# Patient Record
Sex: FEMALE | Race: WHITE | NOT HISPANIC OR LATINO | Employment: OTHER | URBAN - METROPOLITAN AREA
[De-identification: names, ages, dates, MRNs, and addresses within clinical notes are randomized per-mention and may not be internally consistent; named-entity substitution may affect disease eponyms.]

---

## 2017-06-28 ENCOUNTER — TELEPHONE (OUTPATIENT)
Dept: DERMATOLOGY | Facility: CLINIC | Age: 82
End: 2017-06-28

## 2017-07-03 ENCOUNTER — TELEPHONE (OUTPATIENT)
Dept: DERMATOLOGY | Facility: CLINIC | Age: 82
End: 2017-07-03

## 2017-09-09 RX ORDER — CLOBETASOL PROPIONATE 0.5 MG/G
OINTMENT TOPICAL
Qty: 60 G | Refills: 2 | Status: SHIPPED | OUTPATIENT
Start: 2017-09-09 | End: 2018-05-22 | Stop reason: SDUPTHER

## 2017-09-28 RX ORDER — TRIAMCINOLONE ACETONIDE 1 MG/G
CREAM TOPICAL 2 TIMES DAILY
Qty: 453.6 G | Refills: 2 | Status: ON HOLD | OUTPATIENT
Start: 2017-09-28 | End: 2018-06-11 | Stop reason: CLARIF

## 2017-11-03 PROBLEM — M54.2 NECK PAIN: Status: ACTIVE | Noted: 2017-11-03

## 2017-11-29 DIAGNOSIS — M48.061 SPINAL STENOSIS, LUMBAR REGION, WITHOUT NEUROGENIC CLAUDICATION: Primary | ICD-10-CM

## 2017-12-05 DIAGNOSIS — Z12.39 BREAST SCREENING: Primary | ICD-10-CM

## 2018-01-04 PROBLEM — M54.50 CHRONIC LEFT-SIDED LOW BACK PAIN WITHOUT SCIATICA: Status: ACTIVE | Noted: 2018-01-04

## 2018-01-04 PROBLEM — G89.29 CHRONIC LEFT-SIDED LOW BACK PAIN WITHOUT SCIATICA: Status: ACTIVE | Noted: 2018-01-04

## 2018-02-15 DIAGNOSIS — L12.0 BULLOUS PEMPHIGOID: ICD-10-CM

## 2018-02-16 RX ORDER — HYDROXYZINE HYDROCHLORIDE 25 MG/1
TABLET, FILM COATED ORAL
Qty: 30 TABLET | Refills: 1 | Status: SHIPPED | OUTPATIENT
Start: 2018-02-16 | End: 2018-10-19

## 2018-05-23 RX ORDER — CLOBETASOL PROPIONATE 0.5 MG/G
OINTMENT TOPICAL
Qty: 60 G | Refills: 2 | Status: SHIPPED | OUTPATIENT
Start: 2018-05-23 | End: 2019-10-16 | Stop reason: SDUPTHER

## 2018-06-09 ENCOUNTER — HOSPITAL ENCOUNTER (INPATIENT)
Facility: HOSPITAL | Age: 83
LOS: 6 days | Discharge: NURSING, REHAB OR LONG-TERM CARE | DRG: 037 | End: 2018-06-15
Attending: PSYCHIATRY & NEUROLOGY | Admitting: PSYCHIATRY & NEUROLOGY
Payer: MEDICARE

## 2018-06-09 DIAGNOSIS — I63.411 EMBOLIC STROKE INVOLVING RIGHT MIDDLE CEREBRAL ARTERY: Primary | ICD-10-CM

## 2018-06-09 DIAGNOSIS — I63.9 CEREBROVASCULAR ACCIDENT (CVA), UNSPECIFIED MECHANISM: ICD-10-CM

## 2018-06-09 PROBLEM — I10 BENIGN ESSENTIAL HTN: Status: ACTIVE | Noted: 2018-06-09

## 2018-06-09 LAB
ABO + RH BLD: NORMAL
BLD GP AB SCN CELLS X3 SERPL QL: NORMAL
CHOLEST SERPL-MCNC: 159 MG/DL
CHOLEST/HDLC SERPL: 4.4 {RATIO}
ESTIMATED AVG GLUCOSE: 131 MG/DL
HBA1C MFR BLD HPLC: 6.2 %
HDLC SERPL-MCNC: 36 MG/DL
HDLC SERPL: 22.6 %
LDLC SERPL CALC-MCNC: 109.4 MG/DL
NONHDLC SERPL-MCNC: 123 MG/DL
POCT GLUCOSE: 172 MG/DL (ref 70–110)
TRIGL SERPL-MCNC: 68 MG/DL
TSH SERPL DL<=0.005 MIU/L-ACNC: 1.51 UIU/ML

## 2018-06-09 PROCEDURE — 99223 1ST HOSP IP/OBS HIGH 75: CPT | Mod: ,,, | Performed by: PHYSICIAN ASSISTANT

## 2018-06-09 PROCEDURE — 80061 LIPID PANEL: CPT | Mod: 91

## 2018-06-09 PROCEDURE — A4216 STERILE WATER/SALINE, 10 ML: HCPCS | Performed by: HOSPITALIST

## 2018-06-09 PROCEDURE — B313YZZ FLUOROSCOPY OF RIGHT COMMON CAROTID ARTERY USING OTHER CONTRAST: ICD-10-PCS | Performed by: NEUROLOGICAL SURGERY

## 2018-06-09 PROCEDURE — 96374 THER/PROPH/DIAG INJ IV PUSH: CPT

## 2018-06-09 PROCEDURE — 25000003 PHARM REV CODE 250: Performed by: NEUROLOGICAL SURGERY

## 2018-06-09 PROCEDURE — 99284 EMERGENCY DEPT VISIT MOD MDM: CPT | Mod: ,,, | Performed by: EMERGENCY MEDICINE

## 2018-06-09 PROCEDURE — 86901 BLOOD TYPING SEROLOGIC RH(D): CPT

## 2018-06-09 PROCEDURE — 96375 TX/PRO/DX INJ NEW DRUG ADDON: CPT

## 2018-06-09 PROCEDURE — 84443 ASSAY THYROID STIM HORMONE: CPT | Mod: 91

## 2018-06-09 PROCEDURE — 99223 1ST HOSP IP/OBS HIGH 75: CPT | Mod: 25,GC,ICN, | Performed by: NEUROLOGICAL SURGERY

## 2018-06-09 PROCEDURE — 25000003 PHARM REV CODE 250: Performed by: HOSPITALIST

## 2018-06-09 PROCEDURE — B316YZZ FLUOROSCOPY OF RIGHT INTERNAL CAROTID ARTERY USING OTHER CONTRAST: ICD-10-PCS | Performed by: NEUROLOGICAL SURGERY

## 2018-06-09 PROCEDURE — 63600175 PHARM REV CODE 636 W HCPCS: Performed by: NEUROLOGICAL SURGERY

## 2018-06-09 PROCEDURE — 99285 EMERGENCY DEPT VISIT HI MDM: CPT | Mod: 25

## 2018-06-09 PROCEDURE — B31NYZZ FLUOROSCOPY OF OTHER UPPER ARTERIES USING OTHER CONTRAST: ICD-10-PCS | Performed by: NEUROLOGICAL SURGERY

## 2018-06-09 PROCEDURE — 25500020 PHARM REV CODE 255: Performed by: PSYCHIATRY & NEUROLOGY

## 2018-06-09 PROCEDURE — 83036 HEMOGLOBIN GLYCOSYLATED A1C: CPT

## 2018-06-09 PROCEDURE — 63600175 PHARM REV CODE 636 W HCPCS: Performed by: EMERGENCY MEDICINE

## 2018-06-09 PROCEDURE — 20000000 HC ICU ROOM

## 2018-06-09 PROCEDURE — 03CY3ZZ EXTIRPATION OF MATTER FROM UPPER ARTERY, PERCUTANEOUS APPROACH: ICD-10-PCS | Performed by: NEUROLOGICAL SURGERY

## 2018-06-09 PROCEDURE — 99223 1ST HOSP IP/OBS HIGH 75: CPT | Mod: AI,GC,, | Performed by: PSYCHIATRY & NEUROLOGY

## 2018-06-09 PROCEDURE — 25000003 PHARM REV CODE 250: Performed by: PHYSICIAN ASSISTANT

## 2018-06-09 RX ORDER — SODIUM CHLORIDE 9 MG/ML
INJECTION, SOLUTION INTRAVENOUS CONTINUOUS
Status: DISCONTINUED | OUTPATIENT
Start: 2018-06-09 | End: 2018-06-11

## 2018-06-09 RX ORDER — CLOBETASOL PROPIONATE 0.5 MG/G
OINTMENT TOPICAL 2 TIMES DAILY
Status: DISCONTINUED | OUTPATIENT
Start: 2018-06-09 | End: 2018-06-15 | Stop reason: HOSPADM

## 2018-06-09 RX ORDER — NICARDIPINE HYDROCHLORIDE 0.2 MG/ML
5 INJECTION INTRAVENOUS CONTINUOUS
Status: DISCONTINUED | OUTPATIENT
Start: 2018-06-09 | End: 2018-06-10

## 2018-06-09 RX ORDER — ASPIRIN 81 MG/1
81 TABLET ORAL DAILY
Status: DISCONTINUED | OUTPATIENT
Start: 2018-06-10 | End: 2018-06-09

## 2018-06-09 RX ORDER — FENTANYL CITRATE 50 UG/ML
25 INJECTION, SOLUTION INTRAMUSCULAR; INTRAVENOUS ONCE
Status: COMPLETED | OUTPATIENT
Start: 2018-06-09 | End: 2018-06-09

## 2018-06-09 RX ORDER — ONDANSETRON 2 MG/ML
4 INJECTION INTRAMUSCULAR; INTRAVENOUS
Status: COMPLETED | OUTPATIENT
Start: 2018-06-09 | End: 2018-06-09

## 2018-06-09 RX ORDER — ATORVASTATIN CALCIUM 20 MG/1
40 TABLET, FILM COATED ORAL DAILY
Status: DISCONTINUED | OUTPATIENT
Start: 2018-06-10 | End: 2018-06-11

## 2018-06-09 RX ORDER — SODIUM CHLORIDE 0.9 % (FLUSH) 0.9 %
3 SYRINGE (ML) INJECTION EVERY 8 HOURS
Status: DISCONTINUED | OUTPATIENT
Start: 2018-06-09 | End: 2018-06-15 | Stop reason: HOSPADM

## 2018-06-09 RX ADMIN — Medication 3 ML: at 10:06

## 2018-06-09 RX ADMIN — NICARDIPINE HYDROCHLORIDE 5 MG/HR: 0.2 INJECTION, SOLUTION INTRAVENOUS at 11:06

## 2018-06-09 RX ADMIN — CLOBETASOL PROPIONATE: 0.5 OINTMENT TOPICAL at 09:06

## 2018-06-09 RX ADMIN — NICARDIPINE HYDROCHLORIDE 15 MG/HR: 0.2 INJECTION, SOLUTION INTRAVENOUS at 04:06

## 2018-06-09 RX ADMIN — IOHEXOL 100 ML: 350 INJECTION, SOLUTION INTRAVENOUS at 02:06

## 2018-06-09 RX ADMIN — SODIUM CHLORIDE: 0.9 INJECTION, SOLUTION INTRAVENOUS at 08:06

## 2018-06-09 RX ADMIN — ONDANSETRON 4 MG: 2 INJECTION INTRAMUSCULAR; INTRAVENOUS at 02:06

## 2018-06-09 RX ADMIN — FENTANYL CITRATE 25 MCG: 50 INJECTION, SOLUTION INTRAMUSCULAR; INTRAVENOUS at 02:06

## 2018-06-09 NOTE — PROGRESS NOTES
Patient arrived to [facility] from [facility] by [place] ambulance service    Type of stroke/diagnosis:  RMCA    TPA start 1243 and end time 1400      Thrombectomy start and end time 1520      Skin assessment done: Y  Wounds noted: none   NCC notified: name of person notified: Nancy

## 2018-06-09 NOTE — BRIEF OP NOTE
R MCA occlusion.   TICI 3 post two passes with Trevo stent retriever.     No complications.     Sheath left in place due to atherosclerotic disease in R femoral artery.     Juan Ashton M.D.  [facility].

## 2018-06-09 NOTE — SUBJECTIVE & OBJECTIVE
Past Medical History:   Diagnosis Date    Bullous pemphigoid     pemphigoid nodularis variant    DVT (deep venous thrombosis)     Hypertension      Past Surgical History:   Procedure Laterality Date    BREAST BIOPSY Bilateral     BOTH BENIGN    CHOLECYSTECTOMY      FRACTURE SURGERY Right     ankle    HYSTERECTOMY      TONSILLECTOMY       Family History   Problem Relation Age of Onset    Melanoma Neg Hx     Psoriasis Neg Hx     Lupus Neg Hx     Eczema Neg Hx      Social History   Substance Use Topics    Smoking status: Never Smoker    Smokeless tobacco: Not on file    Alcohol use No     Review of patient's allergies indicates:   Allergen Reactions    Sulfa (sulfonamide antibiotics) Hives       Medications: I have not reviewed the current medication administration record.      Review of Systems   Constitutional: Negative for fever.   Gastrointestinal: Positive for nausea and vomiting.   Neurological: Positive for facial asymmetry and weakness.     Objective:     Vital Signs (Most Recent):  Pulse: 80 (06/09/18 1414)  Resp: 18 (06/09/18 1354)  BP: (!) 188/88 (06/09/18 1414)  SpO2: 100 % (06/09/18 1414)    Vital Signs Range (Last 24H):  Temp:  [97.4 °F (36.3 °C)]   Pulse:  [67-80]   Resp:  [13-18]   BP: (149-188)/(54-88)   SpO2:  [95 %-100 %]     Physical Exam   Constitutional: She appears well-developed and well-nourished. No distress.   HENT:   Head: Normocephalic and atraumatic.   Neck: Neck supple.   Cardiovascular: Normal rate.    No murmur heard.  Pulmonary/Chest: Effort normal.   Musculoskeletal: She exhibits no edema.   Neurological: She is alert. A cranial nerve deficit and sensory deficit is present. She exhibits abnormal muscle tone.   Skin: Skin is warm and dry.   Vitals reviewed.      Neurological Exam:   LOC: drowsy  Attention Span: poor  Language: No aphasia  Articulation: Dysarthria  Visual Fields: Hemianopsia left  Facial Movement (CN VII): Upper & lower facial weakness on the  Left  Motor: Arm left  Plegia 0/5  Leg left  Plegia 0/5  Arm right  Normal 5/5  Leg right Normal 5/5  Sensation: Fawad-anesthesia left      Laboratory:  CBC:   Recent Labs  Lab 06/09/18  1208   WBC 8.46   RBC 4.30   HGB 12.7   HCT 39.1      MCV 91   MCH 29.5   MCHC 32.5       Brain imaging:  CTA/CTH 6/9/18:   CT head: Acute right MCA distribution infarct with associated mild localized mass effect.  No significant midline shift or acute intracranial hemorrhage.  Superimposed generalized cerebral volume loss and chronic microvascular ischemic change.  CTA head: Abrupt termination of the distal M1 segment of the right MCA concerning for large vessel occlusion with good filling of distal branches via collateral vessels.  Mild bilateral carotid atherosclerotic plaque with less than 50% hemodynamically significant stenosis by NASCET criteria.      Cardiac Evaluation:   Pending.

## 2018-06-09 NOTE — ASSESSMENT & PLAN NOTE
R MCA syndrome, s/p IV tpA in ED. Dense left hemiplegia, gaze deviation to the right w/ limited visual field to the left. CT scan relatively unchanged, faint evidence of hyperdense mid/distal R M1.   NIHSS 21 on arrival.   Patient was taken to IR for thrombectomy around 2:30 pm.  Site of Occlusion: Right MCA  TICI Score: 3     Antithrombotics for secondary stroke prevention: Antiplatelets: None: Hold all Antithrombotics x 24 hours after IV t-PA administration     Statins for secondary stroke prevention and hyperlipidemia, if present:   Statins: Atorvastatin- 40 mg daily     Aggressive risk factor modification: None     Rehab efforts: PT/OT/SLP to evaluate and treat     Diagnostics ordered/pending:  HgbA1C to assess blood glucose levels, Lipid Profile to assess cholesterol levels, MRI head without contrast to assess brain parenchyma, TTE to assess cardiac function/status      VTE prophylaxis: None: Reason for No Pharmacological VTE Prophylaxis: thrombolysis    BP parameters: Infarct: Post sucessful thrombectomy, SBP <140

## 2018-06-09 NOTE — PROGRESS NOTES
Met pt in IR, received report from Blanca TIRADO from ED. Pt under sterile field and procedure started, unable to perform full neuro assessment. Pt oriented x4 moving right arm and leg spontaneously

## 2018-06-09 NOTE — PROGRESS NOTES
Pt to Interventional Radiology room 190 via stretcher for Cerebral angiogram (thrombectomy). Pt transferred to procedure table in the supine position.  Procedure site (right groin) prepped by   MOE BEE.  Neuro ICU here to relieve ED nurse.

## 2018-06-09 NOTE — PROGRESS NOTES
Type of Device used: Penumbra  Groin Time: 1443  First Pass time: 1505  Site of Occlusion: Right MCA  TICI Score: 3

## 2018-06-09 NOTE — CONSULTS
[facility]  Vascular Neurology  Comprehensive Stroke Center  Consult Note    Consults called by ED upon patient arrival.   Assessment/Plan:     Patient is a 86 y.o. year old female with:    Benign essential HTN    She is on amlodipine and olmesartan at home.   SBP goal is <140.         Embolic stroke involving right middle cerebral artery    R MCA syndrome, s/p IV tpA in ED. Dense left hemiplegia, gaze deviation to the right w/ limited visual field to the left. CT scan relatively unchanged, faint evidence of hyperdense mid/distal R M1.   NIHSS 21 on arrival.   Patient was taken to IR for thrombectomy around 2:30 pm.  Site of Occlusion: Right MCA  TICI Score: 3     Antithrombotics for secondary stroke prevention: Antiplatelets: None: Hold all Antithrombotics x 24 hours after IV t-PA administration     Statins for secondary stroke prevention and hyperlipidemia, if present:   Statins: Atorvastatin- 40 mg daily     Aggressive risk factor modification: None     Rehab efforts: PT/OT/SLP to evaluate and treat     Diagnostics ordered/pending:  HgbA1C to assess blood glucose levels, Lipid Profile to assess cholesterol levels, MRI head without contrast to assess brain parenchyma, TTE to assess cardiac function/status      VTE prophylaxis: None: Reason for No Pharmacological VTE Prophylaxis: thrombolysis    BP parameters: Infarct: Post sucessful thrombectomy, SBP <140                STROKE DOCUMENTATION     Acute Stroke Times   Last Known Normal Date: 06/09/18  Symptom Onset Date: 06/09/18  Stroke Team Called Date: 06/09/18  Stroke Team Arrival Date: 06/09/18  CT Interpretation Time: 1411  Decision to Treat Time for IR: 1430    NIH Scale:  1a. Level Of Consciousness: 1-->Not alert: but arousable by minor stimulation to obey, answer, or respond  1b. LOC Questions: 1-->Answers one question correctly  1c. LOC Commands: 2-->Performs neither task correctly  2. Best Gaze: 1-->Partial gaze palsy: gaze is abnormal  in one or both eyes, but forced deviation or total gaze paresis is not present  3. Visual: 1-->Partial hemianopia  4. Facial Palsy: 2-->Partial paralysis (total or near-total paralysis of lower face)  5a. Motor Arm, Left: 4-->No movement  5b. Motor Arm, Right: 0-->No drift: limb holds 90 (or 45) degrees for full 10 secs  6a. Motor Leg, Left: 4-->No movement  6b. Motor Leg, Right: 0-->No drift: leg holds 30 degree position for full 5 secs  7. Limb Ataxia: 0-->Absent  8. Sensory: 2-->Severe to total sensory loss: patient is not aware of being touched in the face, arm, and leg  9. Best Language: 1-->Mild-to-moderate aphasia: some obvious loss of fluency or facility of comprehension, without significant limitation on ideas expressed or form of expression. Reduction of speech and/or comprehension, however, makes conversation. . . (see row details)  10. Dysarthria: 1-->Mild-to-moderate dysarthria: patient slurs at least some words and, at worst, can be understood with some difficulty  11. Extinction and Inattention (formerly Neglect): 1-->Visual, tactile, auditory, spatial, or personal inattention or extinction to bilateral simultaneous stimulation in one of the sensory modalities  Total (NIH Stroke Scale): 21    Modified [place]   on arrival: 4   [place] Coma Scale:    ABCD2 Score:    [id number] Score:   HAS -BLED Score:   ICH Score:   Hunt & Morales Classification:       Thrombolysis Candidate? Yes, given prior to arrival at outside hospital      Interventional Revascularization Candidate?   Is the patient eligible for mechanical endovascular reperfusion (MARCO)?  Yes      Hemorrhagic change of an Ischemic Stroke: Does this patient have an ischemic stroke with hemorrhagic changes? No     Subjective:     History of Present Illness:  86 year old female with HTN on amlodipine and olmesartan. She was transferred for stroke s/p TPA.   Patient was driving her car when she started to be symptomatic with left sided weakness at  11:30am. She was taken to OSH where she was diagnosed with Right MCA stroke as she had dense MCA sign in CTH. tPA was given at 12:45pm which she finished en route just before arrival to [facility] at 1:53 p.m.    Since then, she had minimal improvement of her symptoms. Patient only complaint at this time is nausea.  Family reports they noticed left facial droop, and inability to move left arm and leg. No history of CVA in the past.        Past Medical History:   Diagnosis Date    Bullous pemphigoid     pemphigoid nodularis variant    DVT (deep venous thrombosis)     Hypertension      Past Surgical History:   Procedure Laterality Date    BREAST BIOPSY Bilateral     BOTH BENIGN    CHOLECYSTECTOMY      FRACTURE SURGERY Right     ankle    HYSTERECTOMY      TONSILLECTOMY       Family History   Problem Relation Age of Onset    Melanoma Neg Hx     Psoriasis Neg Hx     Lupus Neg Hx     Eczema Neg Hx      Social History   Substance Use Topics    Smoking status: Never Smoker    Smokeless tobacco: Not on file    Alcohol use No     Review of patient's allergies indicates:   Allergen Reactions    Sulfa (sulfonamide antibiotics) Hives       Medications: I have not reviewed the current medication administration record.      Review of Systems   Constitutional: Negative for fever.   Gastrointestinal: Positive for nausea and vomiting.   Neurological: Positive for facial asymmetry and weakness.     Objective:     Vital Signs (Most Recent):  Pulse: 80 (06/09/18 1414)  Resp: 18 (06/09/18 1354)  BP: (!) 188/88 (06/09/18 1414)  SpO2: 100 % (06/09/18 1414)    Vital Signs Range (Last 24H):  Temp:  [97.4 °F (36.3 °C)]   Pulse:  [67-80]   Resp:  [13-18]   BP: (149-188)/(54-88)   SpO2:  [95 %-100 %]     Physical Exam   Constitutional: She appears well-developed and well-nourished. No distress.   HENT:   Head: Normocephalic and atraumatic.   Neck: Neck supple.   Cardiovascular: Normal rate.    No murmur heard.  Pulmonary/Chest: Effort  normal.   Musculoskeletal: She exhibits no edema.   Neurological: She is alert. A cranial nerve deficit and sensory deficit is present. She exhibits abnormal muscle tone.   Skin: Skin is warm and dry.   Vitals reviewed.      Neurological Exam:   LOC: drowsy  Attention Span: poor  Language: No aphasia  Articulation: Dysarthria  Visual Fields: Hemianopsia left  Facial Movement (CN VII): Upper & lower facial weakness on the Left  Motor: Arm left  Plegia 0/5  Leg left  Plegia 0/5  Arm right  Normal 5/5  Leg right Normal 5/5  Sensation: Fawad-anesthesia left      Laboratory:  CBC:   Recent Labs  Lab 06/09/18  1208   WBC 8.46   RBC 4.30   HGB 12.7   HCT 39.1      MCV 91   MCH 29.5   MCHC 32.5       Brain imaging:  CTA/CTH 6/9/18:   CT head: Acute right MCA distribution infarct with associated mild localized mass effect.  No significant midline shift or acute intracranial hemorrhage.  Superimposed generalized cerebral volume loss and chronic microvascular ischemic change.  CTA head: Abrupt termination of the distal M1 segment of the right MCA concerning for large vessel occlusion with good filling of distal branches via collateral vessels.  Mild bilateral carotid atherosclerotic plaque with less than 50% hemodynamically significant stenosis by NASCET criteria.      Cardiac Evaluation:   Pending.       Marga Rae MD  [facility]  Department of Vascular Neurology   [facility]

## 2018-06-09 NOTE — ED PROVIDER NOTES
Encounter Date: 6/9/2018       History     Chief Complaint   Patient presents with    CVA Transfer     87yo F h/o HTN transferred for stroke s/p TPA. Per family, pt last seen normal around 11:30am, was driving when acutely had left sided weakness. They brought her to outside hospital, diagnosed with CVA and given TPA around 12:45pm.  Since then, minimal improvement of her symptoms. Patient only complaint at this time is nausea.  Family reports they noticed left facial droop, and inability to move left arm and leg. No history of CVA in the past.      The history is provided by the spouse and a relative.     Review of patient's allergies indicates:   Allergen Reactions    Sulfa (sulfonamide antibiotics) Hives     Past Medical History:   Diagnosis Date    Bullous pemphigoid     pemphigoid nodularis variant    DVT (deep venous thrombosis)     Hypertension      Past Surgical History:   Procedure Laterality Date    BREAST BIOPSY Bilateral     BOTH BENIGN    CHOLECYSTECTOMY      FRACTURE SURGERY Right     ankle    HYSTERECTOMY      TONSILLECTOMY       Family History   Problem Relation Age of Onset    Melanoma Neg Hx     Psoriasis Neg Hx     Lupus Neg Hx     Eczema Neg Hx      Social History   Substance Use Topics    Smoking status: Never Smoker    Smokeless tobacco: Not on file    Alcohol use No     Review of Systems   Constitutional: Negative for fever.   Respiratory: Negative for chest tightness and shortness of breath.    Cardiovascular: Negative for chest pain.   Gastrointestinal: Positive for nausea and vomiting. Negative for abdominal pain.   Neurological: Positive for weakness.       Physical Exam     Initial Vitals   BP Pulse Resp Temp SpO2   06/09/18 1354 06/09/18 1354 06/09/18 1354 06/09/18 1600 06/09/18 1354   (!) 160/71 67 18 97.2 °F (36.2 °C) 98 %      MAP       06/09/18 1354       100.67         Physical Exam    Constitutional: She appears well-developed and well-nourished. She is not  diaphoretic. No distress.   HENT:   Head: Normocephalic and atraumatic.   Eyes: Pupils are equal, round, and reactive to light.   Unable to look leftward   Neck: Neck supple.   Cardiovascular: Normal rate, regular rhythm, normal heart sounds and intact distal pulses. Exam reveals no gallop and no friction rub.    No murmur heard.  Pulmonary/Chest: Breath sounds normal. No respiratory distress. She has no wheezes.   Abdominal: Soft. Bowel sounds are normal. She exhibits no distension. There is no tenderness.   Neurological: She is alert.   Left facial droop, 1/5 strength in left upper and lower extremity, no sensation to left arm and leg, left sided neglect         ED Course   Procedures  Labs Reviewed   POCT GLUCOSE MONITORING CONTINUOUS          X-Ray Abdomen AP 1 View   Final Result      As above.         Electronically signed by: Moy Ramos MD   Date:    06/10/2018   Time:    16:12      MRI Brain Without Contrast   Final Result      Large right MCA territory acute infarct with evidence of hemorrhagic reconversion as above.      Subtle subarachnoid hemorrhage overlying right frontal sulci.      The findings were discussed with Dr. Espinoza at 2:01 p.m..      Electronically signed by resident: Mukesh Sandoval   Date:    06/10/2018   Time:    13:54      Electronically signed by: Moy Ramos MD   Date:    06/10/2018   Time:    14:16      CTA STROKE MULTI-PHASE   Final Result   Abnormal      CT head: Acute right MCA distribution infarct with associated mild localized mass effect.  No significant midline shift or acute intracranial hemorrhage.      Superimposed generalized cerebral volume loss and chronic microvascular ischemic change.      CTA head: Abrupt termination of the distal M1 segment of the right MCA concerning for large vessel occlusion with good filling of distal branches via collateral vessels.      Mild bilateral carotid atherosclerotic plaque with less than 50% hemodynamically significant stenosis  by NASCET criteria.      This report was flagged in Epic as abnormal.      COMMUNICATION   This critical result was discovered/received at 1440.  The critical information above was relayed directly by me by telephone to Dr. Schmid on 06/09/2018 at 1445.      Electronically signed by resident: Eric Fabian   Date:    06/09/2018   Time:    14:45      Electronically signed by: Moy Ramos MD   Date:    06/09/2018   Time:    15:25      IR Angiogram Carotid Internal Inc Arch and Cerebral Unilateral    (Results Pending)        Medical Decision Making:   ED Management:  Patient arrived to ED, vascular neurology evaluated, per vascular neurology still visualize thrombus in major vessel on CTA, they discussed case with IR and family and have plans now to do thrombectomy. Pt being taken to IR for thrombectomy.                      Clinical Impression:   The primary encounter diagnosis was Embolic stroke involving right middle cerebral artery. A diagnosis of Cerebrovascular accident (CVA), unspecified mechanism was also pertinent to this visit.                             Hari Lewis MD  Resident  06/11/18 1112

## 2018-06-09 NOTE — ED NOTES
Pt identifiers checked and accurate with Isa Sewell     Pt presents to ED via EMS transferred from [place]. Pt presents with left sided weakness and facial droop beginning at 11:30 am. Pt experiencing nausea and vomiting. Pt denies headache, CP and SOB.     LOC: The patient is awake, alert. Pt answering questions appropriately, slurred speech.   APPEARANCE: Patient presents via EMS, patient in hospital gown upon arrival   SKIN: The skin is warm and dry, color consistent with ethnicity, skin intact, no breakdown or bruising noted.  RESPIRATORY: Pt oxygen saturation 100% on 2 liters nasal cannula, pt drooling.   CARDIAC: Patient has no peripheral edema noted.  NEUROLOGIC: PERRL, 3 mm bilaterally, right eye opens spontaneously, behavior appropriate to situation, follows commands, left sided facial droop, purposeful motor response noted on right side, movement to painful stimuli on left side. 2+ bilateral radial pulses

## 2018-06-09 NOTE — CONSULTS
[facility]  Neurosurgery  Consult Note    Consults  Subjective:     Chief Complaint/Reason for Admission: R MCA occlusion    History of Present Illness: Ms Sewell is a 86F that presents as transfer from OSH after acute onset of hemiplegia at 1300, witnessed by family. tPA given at OSH, decision by ED at [facility]. Transferred for possible for thrombectomy        Medications:  Continuous Infusions:  Scheduled Meds:  PRN Meds:     Review of Systems  Objective:        There is no height or weight on file to calculate BMI.  Vital Signs (Most Recent):  Pulse: 80 (06/09/18 1414)  Resp: 18 (06/09/18 1354)  BP: (!) 188/88 (06/09/18 1414)  SpO2: 100 % (06/09/18 1414) Vital Signs (24h Range):  Temp:  [97.4 °F (36.3 °C)] 97.4 °F (36.3 °C)  Pulse:  [67-80] 80  Resp:  [13-18] 18  SpO2:  [95 %-100 %] 100 %  BP: (149-188)/(54-88) 188/88                           Neurosurgery Physical Exam   PERRL  dysarthric  R gaze deviation  L facial droop,  LUE/LLE plegic    NIHSS 18      Significant Labs:    Recent Labs  Lab 06/09/18  1208         K 3.7      CO2 24   BUN 25*   CREATININE 0.76   CALCIUM 9.8       Recent Labs  Lab 06/09/18  1208   WBC 8.46   HGB 12.7   HCT 39.1          Recent Labs  Lab 06/09/18  1208   INR 1.0     Microbiology Results (last 7 days)     ** No results found for the last 168 hours. **        All pertinent labs from the last 24 hours have been reviewed.    Significant Diagnostics:  I have reviewed all pertinent imaging results/findings within the past 24 hours.    Assessment/Plan:     Embolic stroke involving right middle cerebral artery    86F with R MCA occlusion    -to angio emergently for thrombectomy  -ASA 2  -malampati 2  -sedation plan: moderate                Hardeep Dorsey MD  Neurosurgery  [facility]

## 2018-06-09 NOTE — ASSESSMENT & PLAN NOTE
86F with R MCA occlusion    -to angio emergently for thrombectomy  -ASA 2  -malampati 2  -sedation plan: moderate

## 2018-06-09 NOTE — SUBJECTIVE & OBJECTIVE
Medications:  Continuous Infusions:  Scheduled Meds:  PRN Meds:     Review of Systems  Objective:        There is no height or weight on file to calculate BMI.  Vital Signs (Most Recent):  Pulse: 80 (06/09/18 1414)  Resp: 18 (06/09/18 1354)  BP: (!) 188/88 (06/09/18 1414)  SpO2: 100 % (06/09/18 1414) Vital Signs (24h Range):  Temp:  [97.4 °F (36.3 °C)] 97.4 °F (36.3 °C)  Pulse:  [67-80] 80  Resp:  [13-18] 18  SpO2:  [95 %-100 %] 100 %  BP: (149-188)/(54-88) 188/88                           Neurosurgery Physical Exam   PERRL  dysarthric  R gaze deviation  L facial droop,  LUE/LLE plegic    NIHSS 18      Significant Labs:    Recent Labs  Lab 06/09/18  1208         K 3.7      CO2 24   BUN 25*   CREATININE 0.76   CALCIUM 9.8       Recent Labs  Lab 06/09/18  1208   WBC 8.46   HGB 12.7   HCT 39.1          Recent Labs  Lab 06/09/18  1208   INR 1.0     Microbiology Results (last 7 days)     ** No results found for the last 168 hours. **        All pertinent labs from the last 24 hours have been reviewed.    Significant Diagnostics:  I have reviewed all pertinent imaging results/findings within the past 24 hours.

## 2018-06-10 LAB
ALBUMIN SERPL BCP-MCNC: 3 G/DL
ALP SERPL-CCNC: 69 U/L
ALT SERPL W/O P-5'-P-CCNC: 28 U/L
ANION GAP SERPL CALC-SCNC: 9 MMOL/L
AORTIC VALVE REGURGITATION: ABNORMAL
AORTIC VALVE STENOSIS: ABNORMAL
AST SERPL-CCNC: 29 U/L
BASOPHILS # BLD AUTO: 0.02 K/UL
BASOPHILS NFR BLD: 0.2 %
BILIRUB SERPL-MCNC: 0.5 MG/DL
BUN SERPL-MCNC: 15 MG/DL
CALCIUM SERPL-MCNC: 7.9 MG/DL
CHLORIDE SERPL-SCNC: 114 MMOL/L
CO2 SERPL-SCNC: 20 MMOL/L
CREAT SERPL-MCNC: 0.8 MG/DL
DIASTOLIC DYSFUNCTION: NO
DIFFERENTIAL METHOD: ABNORMAL
EOSINOPHIL # BLD AUTO: 0.1 K/UL
EOSINOPHIL NFR BLD: 0.5 %
ERYTHROCYTE [DISTWIDTH] IN BLOOD BY AUTOMATED COUNT: 14.1 %
EST. GFR  (AFRICAN AMERICAN): >60 ML/MIN/1.73 M^2
EST. GFR  (NON AFRICAN AMERICAN): >60 ML/MIN/1.73 M^2
ESTIMATED PA SYSTOLIC PRESSURE: 25.09
GLUCOSE SERPL-MCNC: 104 MG/DL
HCT VFR BLD AUTO: 34 %
HGB BLD-MCNC: 11 G/DL
IMM GRANULOCYTES # BLD AUTO: 0.1 K/UL
IMM GRANULOCYTES NFR BLD AUTO: 1 %
LYMPHOCYTES # BLD AUTO: 1.2 K/UL
LYMPHOCYTES NFR BLD: 11.6 %
MAGNESIUM SERPL-MCNC: 1.5 MG/DL
MCH RBC QN AUTO: 29.5 PG
MCHC RBC AUTO-ENTMCNC: 32.4 G/DL
MCV RBC AUTO: 91 FL
MITRAL VALVE MOBILITY: NORMAL
MITRAL VALVE REGURGITATION: ABNORMAL
MONOCYTES # BLD AUTO: 0.8 K/UL
MONOCYTES NFR BLD: 7.8 %
NEUTROPHILS # BLD AUTO: 8.3 K/UL
NEUTROPHILS NFR BLD: 78.9 %
NRBC BLD-RTO: 0 /100 WBC
PHOSPHATE SERPL-MCNC: 3.1 MG/DL
PLATELET # BLD AUTO: 230 K/UL
PMV BLD AUTO: 9.7 FL
POCT GLUCOSE: 104 MG/DL (ref 70–110)
POTASSIUM SERPL-SCNC: 3.4 MMOL/L
PROT SERPL-MCNC: 5.8 G/DL
RBC # BLD AUTO: 3.73 M/UL
RETIRED EF AND QEF - SEE NOTES: 60 (ref 55–65)
SODIUM SERPL-SCNC: 143 MMOL/L
WBC # BLD AUTO: 10.45 K/UL

## 2018-06-10 PROCEDURE — G8978 MOBILITY CURRENT STATUS: HCPCS | Mod: CK

## 2018-06-10 PROCEDURE — G8996 SWALLOW CURRENT STATUS: HCPCS | Mod: CN

## 2018-06-10 PROCEDURE — 27000221 HC OXYGEN, UP TO 24 HOURS

## 2018-06-10 PROCEDURE — 25000003 PHARM REV CODE 250: Performed by: STUDENT IN AN ORGANIZED HEALTH CARE EDUCATION/TRAINING PROGRAM

## 2018-06-10 PROCEDURE — 99223 1ST HOSP IP/OBS HIGH 75: CPT | Mod: AI,GC,, | Performed by: PSYCHIATRY & NEUROLOGY

## 2018-06-10 PROCEDURE — 94761 N-INVAS EAR/PLS OXIMETRY MLT: CPT

## 2018-06-10 PROCEDURE — 20000000 HC ICU ROOM

## 2018-06-10 PROCEDURE — 85025 COMPLETE CBC W/AUTO DIFF WBC: CPT

## 2018-06-10 PROCEDURE — 97166 OT EVAL MOD COMPLEX 45 MIN: CPT

## 2018-06-10 PROCEDURE — 25000003 PHARM REV CODE 250: Performed by: PHYSICIAN ASSISTANT

## 2018-06-10 PROCEDURE — 36415 COLL VENOUS BLD VENIPUNCTURE: CPT

## 2018-06-10 PROCEDURE — 84100 ASSAY OF PHOSPHORUS: CPT

## 2018-06-10 PROCEDURE — 92610 EVALUATE SWALLOWING FUNCTION: CPT

## 2018-06-10 PROCEDURE — 99233 SBSQ HOSP IP/OBS HIGH 50: CPT | Mod: GC,,, | Performed by: PSYCHIATRY & NEUROLOGY

## 2018-06-10 PROCEDURE — 25000003 PHARM REV CODE 250: Performed by: HOSPITALIST

## 2018-06-10 PROCEDURE — A4216 STERILE WATER/SALINE, 10 ML: HCPCS | Performed by: HOSPITALIST

## 2018-06-10 PROCEDURE — 83735 ASSAY OF MAGNESIUM: CPT

## 2018-06-10 PROCEDURE — 63600175 PHARM REV CODE 636 W HCPCS: Performed by: STUDENT IN AN ORGANIZED HEALTH CARE EDUCATION/TRAINING PROGRAM

## 2018-06-10 PROCEDURE — G8997 SWALLOW GOAL STATUS: HCPCS | Mod: CL

## 2018-06-10 PROCEDURE — 97162 PT EVAL MOD COMPLEX 30 MIN: CPT

## 2018-06-10 PROCEDURE — 97802 MEDICAL NUTRITION INDIV IN: CPT

## 2018-06-10 PROCEDURE — 93306 TTE W/DOPPLER COMPLETE: CPT | Mod: 26,,, | Performed by: INTERNAL MEDICINE

## 2018-06-10 PROCEDURE — G8979 MOBILITY GOAL STATUS: HCPCS | Mod: CJ

## 2018-06-10 PROCEDURE — 63600175 PHARM REV CODE 636 W HCPCS: Performed by: PHYSICIAN ASSISTANT

## 2018-06-10 PROCEDURE — 80053 COMPREHEN METABOLIC PANEL: CPT

## 2018-06-10 PROCEDURE — 93306 TTE W/DOPPLER COMPLETE: CPT

## 2018-06-10 RX ORDER — ASPIRIN 325 MG
325 TABLET ORAL DAILY
Status: DISCONTINUED | OUTPATIENT
Start: 2018-06-10 | End: 2018-06-13

## 2018-06-10 RX ORDER — POTASSIUM CHLORIDE 20 MEQ/15ML
40 SOLUTION ORAL ONCE
Status: COMPLETED | OUTPATIENT
Start: 2018-06-10 | End: 2018-06-10

## 2018-06-10 RX ORDER — HEPARIN SODIUM 5000 [USP'U]/ML
5000 INJECTION, SOLUTION INTRAVENOUS; SUBCUTANEOUS EVERY 8 HOURS
Status: DISCONTINUED | OUTPATIENT
Start: 2018-06-10 | End: 2018-06-15 | Stop reason: HOSPADM

## 2018-06-10 RX ORDER — AMLODIPINE BESYLATE 5 MG/1
5 TABLET ORAL DAILY
Status: DISCONTINUED | OUTPATIENT
Start: 2018-06-10 | End: 2018-06-13

## 2018-06-10 RX ADMIN — POTASSIUM CHLORIDE 40 MEQ: 20 SOLUTION ORAL at 04:06

## 2018-06-10 RX ADMIN — AMLODIPINE BESYLATE 5 MG: 5 TABLET ORAL at 04:06

## 2018-06-10 RX ADMIN — ATORVASTATIN CALCIUM 40 MG: 20 TABLET, FILM COATED ORAL at 04:06

## 2018-06-10 RX ADMIN — CLOBETASOL PROPIONATE: 0.5 OINTMENT TOPICAL at 09:06

## 2018-06-10 RX ADMIN — Medication 3 ML: at 02:06

## 2018-06-10 RX ADMIN — HEPARIN SODIUM 5000 UNITS: 5000 INJECTION, SOLUTION INTRAVENOUS; SUBCUTANEOUS at 10:06

## 2018-06-10 RX ADMIN — MAGNESIUM SULFATE HEPTAHYDRATE 3 G: 500 INJECTION, SOLUTION INTRAMUSCULAR; INTRAVENOUS at 01:06

## 2018-06-10 RX ADMIN — HEPARIN SODIUM 5000 UNITS: 5000 INJECTION, SOLUTION INTRAVENOUS; SUBCUTANEOUS at 02:06

## 2018-06-10 RX ADMIN — Medication 3 ML: at 10:06

## 2018-06-10 RX ADMIN — ASPIRIN 325 MG ORAL TABLET 325 MG: 325 PILL ORAL at 04:06
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
## 2018-06-10 NOTE — ASSESSMENT & PLAN NOTE
- Hold home oral antihypertensives in acute post-ictus period  - SBP goal 100-140  - Nicardipine ordered, not requiring at this time  - Echo pending

## 2018-06-10 NOTE — PLAN OF CARE
Problem: Patient Care Overview  Goal: Plan of Care Review  Outcome: Ongoing (interventions implemented as appropriate)  POC reviewed with pt and family at 1400. Pt, , and daughter verbalized understanding. Questions and concerns addressed. No acute events today. Pt progressing toward goals. Will continue to monitor. See flowsheets for full assessment and VS info.

## 2018-06-10 NOTE — PLAN OF CARE
Problem: Physical Therapy Goal  Goal: Physical Therapy Goal  Outcome: Ongoing (interventions implemented as appropriate)  Initial eval completed.  Results, POC, and therapy recommendations discussed with patient,  and fmaily.  Complete evaluation documentation to follow.     Pt requires 1 person assistance for transfers to/from chair.     Marleni Preston, PT  6/10/2018  [phone] (pager)

## 2018-06-10 NOTE — H&P
[facility]  Neurocritical Care  History & Physical    Admit Date: 6/9/2018  Service Date: 06/09/2018  Length of Stay: 0    Subjective:     Chief Complaint: Embolic stroke involving right middle cerebral artery    History of Present Illness: Ms. Sewell is a 85 y/o female with PMH significant for HTN and bullous pemphigoid who presents to [facility] s/p tPA and R MCA thrombectomy. Patient developed acute onset LSW at 1130 am. Patient received TPA at 1245 pm and was then transferred to [facility] for thrombectomy. Patient had successful thrombectomy of R MCA with TICI 3 after two passes.     Vitals:   Temp: 97.2 °F (36.2 °C)  Pulse: 61  Rhythm: normal sinus rhythm  BP: (!) 141/62  MAP (mmHg): 89  Resp: (!) 22  SpO2: 100 %  O2 Device (Oxygen Therapy): nasal cannula    Temp  Min: 97.2 °F (36.2 °C)  Max: 97.4 °F (36.3 °C)  Pulse  Min: 20  Max: 81  BP  Min: 90/58  Max: 188/88  MAP (mmHg)  Min: 66  Max: 127  Resp  Min: 10  Max: 26  SpO2  Min: 95 %  Max: 100 %    No intake/output data recorded.         Examination:   Constitutional: Well-nourished and -developed. No apparent distress.   Eyes: Conjunctiva clear, anicteric. Lids no lesions.  Head/Ears/Nose/Mouth/Throat/Neck: Moist mucous membranes. External ears, nose atraumatic.   Cardiovascular: Regular rhythm. No murmurs. No leg edema.  Respiratory: Comfortable respirations. Clear to auscultation.  Gastrointestinal: No hernia. Soft, nondistended, nontender. + bowel sounds.    Neurologic:   -[id number]  -Alert. Oriented to person, place, and time. Speech dysarthric. Follows commands. Recent and remote memory adequate. Judgment good. Insight appropriate.  -L facial droop, R gaze preference, but able to cross midline, blinks to threat bilaterally   -Strength: LUE 4/5, LLE 3/5, 5/5 on R.   -Sensation intact to touch in arms, legs.  -Gait and station not tested a patient flat post-procedurally     Today I independently reviewed pertinent medications, lines/drains/airways,  imaging, lab results,     Assessment/Plan:     Neuro   Embolic stroke involving right middle cerebral artery    85 y/o female with PMH of HTN s/p TPA and thrombectomy for RMCA stroke   - Admit to NCC  - Sheath remains in place, will be removed by NSGY tonight, flat x4h post-removal   - Vascular Neurology following  - Statin   - Hold antiplatelets x24h post-TPA  - Echo, TSH, lipid panel, hemoglobin A1c for stroke work-up  - PT/OT/SLP  - MRI scheduled for tomorrow at noon        Derm   Pemphigus    - Continue home cream         Cardiac/Vascular   Benign essential HTN    - Hold home oral antihypertensives in acute post-ictus period  - SBP goal 100-140  - Nicardipine ordered, not requiring at this time  - Echo pending             Prophylaxis:  Venous Thromboembolism: mechanical  Stress Ulcer: NA  Ventilator Pneumonia: not applicable     Activity Orders          None        Full Code    Mari Butler PA-C  Neurocritical Care  [facility]

## 2018-06-10 NOTE — ASSESSMENT & PLAN NOTE
85 y/o female with PMH of HTN s/p TPA and thrombectomy for RMCA stroke   - Admit to NCC  - Sheath remains in place, will be removed by NSGY tonight, flat x4h post-removal   - Vascular Neurology following  - Statin   - Hold antiplatelets x24h post-TPA  - Echo, TSH, lipid panel, hemoglobin A1c for stroke work-up  - PT/OT/SLP  - MRI scheduled for tomorrow at noon

## 2018-06-10 NOTE — NURSING
1200: Pt brought to MRI via bed w/ 1 RN and 1 PCT w/ portable tele monitor, ambu bag, and 2L NC.    1300: Pt arrived back in room from MRI. Pt reoriented to the room. Pt tolerated procedure well.

## 2018-06-10 NOTE — PLAN OF CARE
Problem: Patient Care Overview  Goal: Plan of Care Review  Outcome: Ongoing (interventions implemented as appropriate)  Recommendations     1. If able to advance diet, recommend regular diet (texture per SLP).   2. If unable to advance diet, initiate enteral nutrition. Recommend Isosource 1.5 @ 40 mL/hr to provide 1440 calories, 65 g of protein, 733 mL fluid.   3. RD to monitor & follow-up.

## 2018-06-10 NOTE — PT/OT/SLP EVAL
"Speech Language Pathology Evaluation  Bedside Swallow    Patient Name:  Isa Sewell   MRN:  [id number]  Admitting Diagnosis: Embolic stroke involving right middle cerebral artery   S/p thrombectomy     Recommendations:                 General Recommendations:  Dysphagia therapy, Speech language evaluation and Cognitive-linguistic evaluation  Diet recommendations:  NPO, NPO   Aspiration Precautions: Alternate means of nutrition/hydration, Frequent oral care and Strict aspiration precautions   General Precautions: Standard, aspiration, fall, NPO  Communication strategies:  provide increased time to answer    History:     Past Medical History:   Diagnosis Date    Bullous pemphigoid     pemphigoid nodularis variant    DVT (deep venous thrombosis)     Hypertension        Past Surgical History:   Procedure Laterality Date    BREAST BIOPSY Bilateral     BOTH BENIGN    CHOLECYSTECTOMY      FRACTURE SURGERY Right     ankle    HYSTERECTOMY      TONSILLECTOMY         Social History: Patient lives with spouse, indpt pta, including driving.    Prior Intubation HX:  None this admission    Modified Barium Swallow: none this admission    Chest X-Rays: no recent    Prior diet: reg/thin.    Subjective     Nursing reports coughing on thins this morning.  Daughter at bedside reports pt has a "nasal drip" that makes her cough.       Pain/Comfort:  · Pain Rating 1: 0/10  · Pain Rating Post-Intervention 1: 0/10    Objective:     Oral Musculature Evaluation  · Oral Musculature: left weakness  · Dentition: present and adequate  · Mucosal Quality: adequate  · Oral Labial Strength and Mobility: impaired retraction  · Lingual Strength and Mobility: impaired strength  · Buccal Strength and Mobility: decreased tone  · Volitional Cough: weak  · Volitional Swallow: decreased rise palpated  · Voice Prior to PO Intake: clear, dysarthria    Bedside Swallow Eval:   Consistencies Assessed:  · Thin liquids tspn x2  · Puree tspn x2     Oral " Phase:   · Slow oral transit time    Pharyngeal Phase:   · Coughing/choking on 1/2 tspns of thin and 1/2 tspns of puree  · decreased hyolaryngeal excursion to palpation  · delayed swallow initation  · multiple spontaneous swallows  · wet vocal quality after swallow    Compensatory Strategies  · None    Treatment: Educaiton provided on role of SLP, recs for npo, s/s aspiration, increased aspiration risk s/p stroke with significant L facial weakness, risks associated with aspiration and SLP POC.  Pt and family verbalized understnading and agreement.  Pt's nurse alerted re: results and recs.   White board updated.      Assessment:     Isa Sewell is a 86 y.o. female with an SLP diagnosis of Dysphagia and Dysarthria.  She presents with overt s/s aspiration with thin and puree trials.    Goals:    SLP Goals        Problem: SLP Goal    Goal Priority Disciplines Outcome   SLP Goal     SLP Ongoing (interventions implemented as appropriate)   Description:  Speech Language Pathology Goals  Goals expected to be met by 6/17  1. Pt will participate in ongoing assessment of swallow.   2. Pt will complete speech, language, cognitive evaluation to determine need for tx.                           Plan:     · Patient to be seen:  5 x/week   · Plan of Care expires:  07/10/18  · Plan of Care reviewed with:  patient, family   · SLP Follow-Up:  Yes       Discharge recommendations:   (pending PT/OT)   Barriers to Discharge:  not safe for po intake at this time    Time Tracking:     SLP Treatment Date:   06/10/18  Speech Start Time:  0915  Speech Stop Time:  0932     Speech Total Time (min):  17 min    Billable Minutes: Eval Swallow and Oral Function 17    JORGE Fitzpatrick, CCC-SLP  06/10/2018

## 2018-06-10 NOTE — NURSING
Pt HR 55 when asleep. Pt HR 50, nonsymptomatic. Notified LOVE Cruz w/ CANDE. 12 lead analysis done and given to Nancy. Will continue to monitor very closely.

## 2018-06-10 NOTE — CONSULTS
"  [facility]  Adult Nutrition  Consult Note    SUMMARY     Recommendations    1. If able to advance diet, recommend regular diet (texture per SLP).   2. If unable to advance diet, initiate enteral nutrition. Recommend Isosource 1.5 @ 40 mL/hr to provide 1440 calories, 65 g of protein, 733 mL fluid.   3. RD to monitor & follow-up.    Goals: Meet % EEN, EPN  Nutrition Goal Status: new  Communication of RD Recs: reviewed with RN    Reason for Assessment    Reason for Assessment: consult  Diagnosis: stroke/CVA  Relevant Medical History: HTN  Interdisciplinary Rounds: did not attend    General Information Comments: S/p tpa & R. MCA thrombectomy. Failed ZURI, awaiting ST evaluation.   Nutrition Discharge Planning: Unable to determine    Nutrition/Diet History    Patient Reported Diet/Restrictions/Preferences: other (see comments) (KRIS)  Do you have any cultural, spiritual, Christian conflicts, given your current situation?: none  Factors Affecting Nutritional Intake: NPO    Anthropometrics    Temp: 97.9 °F (36.6 °C)  Height Method: Measured  Height: 5' 0.63" (154 cm)  Height (inches): 60.63 in  Weight Method: Bed Scale  Weight: 76 kg (167 lb 8.8 oz)  Weight (lb): 167.55 lb  Ideal Body Weight (IBW), Female: 103.15 lb  % Ideal Body Weight, Female (lb): 162.43 lb  BMI (Calculated): 32.1  BMI Grade: 30 - 34.9- obesity - grade I    Lab/Procedures/Meds    Pertinent Labs Reviewed: reviewed  Pertinent Labs Comments: A1C 6.2  Pertinent Medications Reviewed: reviewed  Pertinent Medications Comments: IVF, Nicardipine, Statin    Physical Findings/Assessment    Overall Physical Appearance: overweight, nourished  Oral/Mouth Cavity: WDL  Skin: intact    Estimated/Assessed Needs    Weight Used For Calorie Calculations: 76 kg (167 lb 8.8 oz)     Energy Calorie Requirements (kcal): 1415 kcal/d  Energy Need Method: [place] (1.25 PAL)     Protein Requirements: 69-84 g/d (.9-1.1 g/kg)  Weight Used For Protein " Calculations: 76 kg (167 lb 8.8 oz)     Fluid Need Method: other (see comments) (Per MD or 1 mL/kcal)     Nutrition Prescription Ordered    Current Diet Order: NPO    Evaluation of Received Nutrient/Fluid Intake    IV Fluid (mL): 1800    Comments: LBM not recorded    Nutrition Risk    Level of Risk/Frequency of Follow-up: high     Assessment and Plan    Cerebrovascular accident (CVA)      Nutrition Problem  Inadequate energy intake    Related to (etiology):   Inability to consume sufficient energy    Signs and Symptoms (as evidenced by):   NPO with no alternate means of nutrition     Nutrition Diagnosis Status:   New         Monitor and Evaluation    Food and Nutrient Intake: energy intake, food and beverage intake, enteral nutrition intake  Food and Nutrient Adminstration: diet order, enteral and parenteral nutrition administration  Physical Activity and Function: nutrition-related ADLs and IADLs  Anthropometric Measurements: weight, weight change  Biochemical Data, Medical Tests and Procedures: inflammatory profile, lipid profile, glucose/endocrine profile, gastrointestinal profile, electrolyte and renal panel  Nutrition-Focused Physical Findings: overall appearance     Nutrition Follow-Up    RD Follow-up?: Yes

## 2018-06-10 NOTE — PROGRESS NOTES
Neuro surgery @ BS to pull R femoral sheath. Arterial Sheath removed, Hemostasis achieved with manual pressure x10 min. Clean dressing applied, no bleeding, no hematoma noted. Pt tolerated well. Will continue to monitor.

## 2018-06-10 NOTE — CONSULTS
Inpatient consult to Physical Medicine Rehab  Consult performed by: AYAN ARNOLD  Consult ordered by: NISA SANFORD  Reason for consult: assess rehab needs        Reviewed patient history and current admission.  Rehab team following.  Full consult to follow.    JOSEPHINE Escobedo, FNP-C  Physical Medicine & Rehabilitation   06/10/2018  Spectralink: [id number]

## 2018-06-10 NOTE — PLAN OF CARE
Problem: Patient Care Overview  Goal: Plan of Care Review  Outcome: Ongoing (interventions implemented as appropriate)  POC reviewed with pt and daughter at 0500. Pt and daughter verbalized understanding. Questions and concerns addressed. No acute events overnight. Pt progressing toward goals. Will continue to monitor. See flowsheets for full assessment and VS info

## 2018-06-10 NOTE — PROGRESS NOTES
[facility]  Vascular Neurology  Comprehensive Stroke Center  Progress Note    Assessment/Plan:     * Embolic stroke involving right middle cerebral artery    R MCA syndrome, s/p IV tpA in ED. Dense left hemiplegia, gaze deviation to the right w/ limited visual field to the left. CT scan relatively unchanged, faint evidence of hyperdense mid/distal R M1.   NIHSS 21 on arrival.   Patient was taken to IR for thrombectomy around 2:30 pm.  Site of Occlusion: Right MCA  TICI Score: 3     Antithrombotics for secondary stroke prevention: Antiplatelets: None: Hold all Antithrombotics x 24 hours after IV t-PA administration     Statins for secondary stroke prevention and hyperlipidemia, if present:   Statins: Atorvastatin- 40 mg daily     Aggressive risk factor modification: None     Rehab efforts: PT/OT/SLP to evaluate and treat     Diagnostics ordered/pending:  HgbA1C to assess blood glucose levels, Lipid Profile to assess cholesterol levels, MRI head without contrast to assess brain parenchyma, TTE to assess cardiac function/status      VTE prophylaxis: None: Reason for No Pharmacological VTE Prophylaxis: thrombolysis    BP parameters: Infarct: Post sucessful thrombectomy, SBP <140            Benign essential HTN    She is on amlodipine and olmesartan at home.   SBP goal is <140.              Patient was transferred from OSH with Right MCA stroke. She received tPA. She is going to IR for thrombectomy.   9/10 she had small area of hemorrhagic conversion. She is clinically improving. She failed her swallow eval.     STROKE DOCUMENTATION   Acute Stroke Times   Last Known Normal Date: 06/09/18  Symptom Onset Date: 06/09/18  Stroke Team Called Date: 06/09/18  Stroke Team Arrival Date: 06/09/18  CT Interpretation Time: 1411  Decision to Treat Time for IR: 1430    NIH Scale:  1a. Level Of Consciousness: 0-->Alert: keenly responsive  1b. LOC Questions: 0-->Answers both questions correctly  1c. LOC Commands:  0-->Performs both tasks correctly  2. Best Gaze: 1-->Partial gaze palsy: gaze is abnormal in one or both eyes, but forced deviation or total gaze paresis is not present  3. Visual: 0-->No visual loss  4. Facial Palsy: 2-->Partial paralysis (total or near-total paralysis of lower face)  5a. Motor Arm, Left: 1-->Drift: limb holds 90 (or 45) degrees, but drifts down before full 10 seconds: does not hit bed or other support  5b. Motor Arm, Right: 0-->No drift: limb holds 90 (or 45) degrees for full 10 secs  6a. Motor Leg, Left: 1-->Drift: leg falls by the end of the 5-sec period but does not hit bed  6b. Motor Leg, Right: 0-->No drift: leg holds 30 degree position for full 5 secs  7. Limb Ataxia: 0-->Absent  8. Sensory: 0-->Normal: no sensory loss  9. Best Language: 1-->Mild-to-moderate aphasia: some obvious loss of fluency or facility of comprehension, without significant limitation on ideas expressed or form of expression. Reduction of speech and/or comprehension, however, makes conversation. . . (see row details)  10. Dysarthria: 1-->Mild-to-moderate dysarthria: patient slurs at least some words and, at worst, can be understood with some difficulty  11. Extinction and Inattention (formerly Neglect): 0-->No abnormality  Total (NIH Stroke Scale): 7       Modified [place]    [place] Coma Scale:    ABCD2 Score:    [id number] Score:   HAS -BLED Score:   ICH Score:   Hunt & Morales Classification:      Hemorrhagic change of an Ischemic Stroke: Does this patient have an ischemic stroke with hemorrhagic changes? Yes, Grading Scale: PH Type 1 (PH-1) = hematoma in < 30% of the infarcted area with some slight space-occupying effect. Is this a symptomatic change?  No - Hemorrhage is not clinically significant     Neurologic Chief Complaint: Right MCA stroke.     Subjective:     Interval History: Patient is seen for follow-up neurological assessment and treatment recommendations:   Patient is symptomatically improving after the  thrombectomy.     HPI, Past Medical, Family, and Social History remains the same as documented in the initial encounter.     Review of Systems   Constitutional: Negative for fever.   Gastrointestinal: Positive for nausea and vomiting.   Neurological: Positive for facial asymmetry and weakness.     Scheduled Meds:   amLODIPine  5 mg Per NG tube Daily    aspirin  325 mg Per NG tube Daily    atorvastatin  40 mg Oral Daily    clobetasol 0.05%   Topical (Top) BID    heparin (porcine)  5,000 Units Subcutaneous Q8H    magnesium sulfate IVPB  3 g Intravenous Once    potassium chloride 10%  40 mEq Per NG tube Once    sodium chloride 0.9%  3 mL Intravenous Q8H     Continuous Infusions:   sodium chloride 0.9% 75 mL/hr at 06/10/18 1202     PRN Meds:    Objective:     Vital Signs (Most Recent):  Temp: 97.9 °F (36.6 °C) (06/10/18 1102)  Pulse: 63 (06/10/18 1200)  Resp: 20 (06/10/18 1200)  BP: (!) 146/65 (06/10/18 1200)  SpO2: 100 % (06/10/18 1200)  BP Location: Right arm    Vital Signs Range (Last 24H):  Temp:  [97.2 °F (36.2 °C)-97.9 °F (36.6 °C)]   Pulse:  [51-85]   Resp:  [6-50]   BP: ()/(54-98)   SpO2:  [95 %-100 %]   Arterial Line BP: (131-159)/(52-82)   BP Location: Right arm    Physical Exam   Constitutional: She appears well-developed and well-nourished. No distress.   HENT:   Head: Normocephalic and atraumatic.   Neck: Neck supple.   Cardiovascular: Normal rate.    No murmur heard.  Pulmonary/Chest: Effort normal.   Musculoskeletal: She exhibits no edema.   Neurological: She is alert. A cranial nerve deficit and sensory deficit is present. She exhibits abnormal muscle tone.   Skin: Skin is warm and dry.   Vitals reviewed.    Neurological Exam:   LOC: Alert  Attention Span: good  Language: No aphasia  Articulation: Dysarthria  Visual Fields: intact   Facial Movement (CN VII): Upper & lower facial weakness on the Left  Motor: Arm left  Plegia  3/5  Leg left  Plegia  3/5  Arm right  Normal 5/5  Leg right  Normal 5/5  Sensation: intact     Laboratory:  CBC:   Recent Labs  Lab 06/10/18  0250   WBC 10.45   RBC 3.73*   HGB 11.0*   HCT 34.0*      MCV 91   MCH 29.5   MCHC 32.4       Diagnostic Results     Brain Imaging/Vessel Imaging   MRI 6/10   Large right MCA territory acute infarct with evidence of hemorrhagic reconversion as above.  Subtle subarachnoid hemorrhage overlying right frontal sulci.    CTH/CTA 6/9   CT head: Acute right MCA distribution infarct with associated mild localized mass effect.  No significant midline shift or acute intracranial hemorrhage.  Superimposed generalized cerebral volume loss and chronic microvascular ischemic change.  CTA head: Abrupt termination of the distal M1 segment of the right MCA concerning for large vessel occlusion with good filling of distal branches via collateral vessels.  Mild bilateral carotid atherosclerotic plaque with less than 50% hemodynamically significant stenosis by NASCET criteria.    Cardiac Imaging   Echo is pending.       Marga Rae MD  Comprehensive Stroke Center  Department of Vascular Neurology   [facility]

## 2018-06-10 NOTE — SUBJECTIVE & OBJECTIVE
Neurologic Chief Complaint: Right MCA stroke.     Subjective:     Interval History: Patient is seen for follow-up neurological assessment and treatment recommendations:   Patient has improved significantly despite developing a small area of hemorrhagic conversion. She has regained most of her strength back. She is still having facial weakness and difficulty swallowing.       HPI, Past Medical, Family, and Social History remains the same as documented in the initial encounter.     Review of Systems   Constitutional: Negative for fever.   HENT: Positive for trouble swallowing.    Gastrointestinal: Negative for nausea and vomiting.   Neurological: Positive for facial asymmetry. Negative for weakness.     Scheduled Meds:   amLODIPine  5 mg Per NG tube Daily    aspirin  325 mg Per NG tube Daily    atorvastatin  40 mg Oral Daily    clobetasol 0.05%   Topical (Top) BID    heparin (porcine)  5,000 Units Subcutaneous Q8H    magnesium sulfate IVPB  3 g Intravenous Once    potassium chloride 10%  40 mEq Per NG tube Once    sodium chloride 0.9%  3 mL Intravenous Q8H     Continuous Infusions:   sodium chloride 0.9% 75 mL/hr at 06/10/18 1402     PRN Meds:    Objective:     Vital Signs (Most Recent):  Temp: 97.9 °F (36.6 °C) (06/10/18 1102)  Pulse: 83 (06/10/18 1400)  Resp: 20 (06/10/18 1400)  BP: (!) 150/65 (06/10/18 1400)  SpO2: 99 % (06/10/18 1400)  BP Location: Right arm    Vital Signs Range (Last 24H):  Temp:  [97.2 °F (36.2 °C)-97.9 °F (36.6 °C)]   Pulse:  [51-85]   Resp:  [6-50]   BP: ()/(54-98)   SpO2:  [95 %-100 %]   Arterial Line BP: (131-159)/(52-82)   BP Location: Right arm    Physical Exam   Constitutional: She appears well-developed and well-nourished. No distress.   HENT:   Head: Normocephalic and atraumatic.   Neck: Neck supple.   Cardiovascular: Normal rate.    No murmur heard.  Pulmonary/Chest: Effort normal.   Musculoskeletal: She exhibits no edema.   Neurological: She is alert. A cranial nerve  deficit and sensory deficit is present. She exhibits abnormal muscle tone.   Skin: Skin is warm and dry.   Vitals reviewed.    Neurological Exam:   LOC: Alert  Attention Span: good  Language: No aphasia  Articulation: moderate Dysarthria  Visual Fields: intact   Facial Movement (CN VII): Upper < lower facial weakness on the Left  Motor: Arm left  Plegia +4/5  Leg left  Plegia +4/5  Arm right  Normal 5/5  Leg right Normal 5/5  Sensation: intact     Laboratory:  CBC:     Recent Labs  Lab 06/10/18  0250   WBC 10.45   RBC 3.73*   HGB 11.0*   HCT 34.0*      MCV 91   MCH 29.5   MCHC 32.4       Diagnostic Results     Brain Imaging/Vessel Imaging   MRI 6/10   Large right MCA territory acute infarct with evidence of hemorrhagic reconversion as above.  Subtle subarachnoid hemorrhage overlying right frontal sulci.    CTH/CTA 6/9   CT head: Acute right MCA distribution infarct with associated mild localized mass effect.  No significant midline shift or acute intracranial hemorrhage.  Superimposed generalized cerebral volume loss and chronic microvascular ischemic change.  CTA head: Abrupt termination of the distal M1 segment of the right MCA concerning for large vessel occlusion with good filling of distal branches via collateral vessels.  Mild bilateral carotid atherosclerotic plaque with less than 50% hemodynamically significant stenosis by NASCET criteria.    Cardiac Imaging   CONCLUSIONS     1 - Moderate left atrial enlargement.     2 - Normal left ventricular systolic function (EF 60-65%).     3 - No wall motion abnormalities.     4 - Normal left ventricular diastolic function.     5 - Normal right ventricular systolic function .     6 - Mild to moderate aortic stenosis, ESTELITA = 1.17 cm2, AVAi = 0.68 cm2/m2, peak velocity = 2.75 m/s, mean gradient = 17 mmHg.     7 - Mild aortic regurgitation.     8 - The estimated PA systolic pressure is 25 mmHg.     9 - Mild mitral regurgitation.

## 2018-06-10 NOTE — PLAN OF CARE
Problem: SLP Goal  Goal: SLP Goal  Speech Language Pathology Goals  Goals expected to be met by 6/17  1. Pt will participate in ongoing assessment of swallow.   2. Pt will complete speech, language, cognitive evaluation to determine need for tx.         Outcome: Ongoing (interventions implemented as appropriate)  Bedside swallow study completed.  Rec npo with strict aspiration precautions at this time.  SLP to continue to follow. JORGE Fitzpatrick, TWYLA/SLP  6/10/2018

## 2018-06-10 NOTE — PROGRESS NOTES
Pt transferred to room 7087 and report given to Bharti TIRADO. Pt placed in bed and on bedside  monitor

## 2018-06-10 NOTE — PT/OT/SLP EVAL
Occupational Therapy   Evaluation    Name: Isa Sewell  MRN: [id number]  Admitting Diagnosis:  Embolic stroke involving right middle cerebral artery      Recommendations:     Discharge Recommendations: rehabilitation facility  Discharge Equipment Recommendations:   (TBD pending progress)  Barriers to discharge:  None    History:     Occupational Profile:  Living Environment: Pt lives with  in [facility], [address].  Bathroom contains tub/shower and other contains walk-in shower with shower seat present.  Previous level of function: Pt reports being (I) with all ADLs and mobility.    Roles and Routines: Mother, wife, drives, enjoys Mosque, helps with housework, cooks  Equipment Owned:  shower chair  Assistance upon Discharge:  and family able to provide assist upon d/c.    Past Medical History:   Diagnosis Date    Bullous pemphigoid     pemphigoid nodularis variant    DVT (deep venous thrombosis)     Hypertension        Past Surgical History:   Procedure Laterality Date    BREAST BIOPSY Bilateral     BOTH BENIGN    CHOLECYSTECTOMY      FRACTURE SURGERY Right     ankle    HYSTERECTOMY      TONSILLECTOMY         Subjective     Chief Complaint: Weakness  Patient/Family stated goals: Resume PLOF  Communicated with: RN prior to session.  Pain/Comfort:  · Pain Rating 1: 0/10  · Pain Rating Post-Intervention 1: 0/10    Patients cultural, spiritual, Taoist conflicts given the current situation: None stated    Objective:     Patient found with: telemetry, blood pressure cuff, pulse ox (continuous), PureWick, peripheral IV, bed alarm, oxygen.  Daughter and  present.  Therapy tech (Cindy) assisted with session.    General Precautions: Standard, aspiration, fall, NPO   Orthopedic Precautions:N/A   Braces: N/A     Occupational Performance:    Bed Mobility:    · Patient completed Rolling/Turning to Left with  moderate assistance  · Patient completed Rolling/Turning to Right with moderate  assistance  · Patient completed Scooting/Bridging with moderate assistance towards EOB; Maximum assistance towards HOB via draw sheet (pt able to push with RLE)  · Patient completed Supine to Sit with maximal assistance  · Patient completed Sit to Supine with maximal assistance    Functional Mobility/Transfers:  · Patient completed Sit <> Stand Transfer with maximum assistance  with  no assistive device (with assist of 2) x 3 trials from EOB.  Left foot and knee blocked to prevent buckling and bilateral HHA provided.  OT and therapy tech stood on either side of pt.  Cues given to elevate head and extend hips to achieve full upright position.  Moderate postural instability noted during task.  · Functional Mobility: Pt practiced weight shifting with Max A and assist of 2, but unable to take steps this date.    Activities of Daily Living:  · Grooming: stand by assistance for washing face with cloth while seated EOB.  · UB Dressing: moderate assistance for donning/doffing gown on backside like robe while seated EOB.   · Toileting:  Total A to perform mera care in side lying position after urinating at bed level.  Pt able to assist with rolling and maintained side lying position without assist.    Cognitive/Visual Perceptual:  Cognitive/Psychosocial Skills:    -       Oriented to: Person, Place and Time   -       Follows Commands/attention:Follows two-step commands  -       Communication: clear/fluent  -       Memory: No deficits noted  -       Safety awareness/insight to disability: impaired   -       Mood/Affect/Coping skills/emotional control: Appropriate to situation    Physical Exam:  Postural examination/scapula alignment: -       Rounded shoulders  -       Forward head  Skin integrity: Eczema present  Edema:  None noted  Sensation: -       Intact  Motor Planning: -       WFL  Dominant hand: -       Right  Upper Extremity Range of Motion:    -       Right Upper Extremity: WFL  -       Left Upper Extremity:  WFL  Upper Extremity Strength:   -       Right Upper Extremity: WFL  -       Left Upper Extremity: WFL; slightly weaker than RUE   Strength:  4/5 right hand; 3/5 left hand  Fine Motor Coordination: -       Intact R hand; Impaired for left hand  Gross motor coordination: WFL  Balance:  Sitting- SBA-CGA; Standing- Max A    Patient left HOB elevated with all lines intact, call button in reach, bed alarm on, RN notified and family present    [facility] 6 Click:  [facility] Total Score: 15    Treatment & Education:  *Pt sat EOB for ~20 minutes with SBA-CGA required to maintain upright position.   *Pt and family educated on role of OT and progression of therapy  *Goals and POC discussed  *Whiteboard updated; pt safe to t/f with therapy only at this time to bedside chair.  Appropriate for draw sheet transfer to medi chair with 2 person assist of PCT and RN  Education:    Assessment:     Isa Sewell is a 86 y.o. female with a medical diagnosis of Embolic stroke involving right middle cerebral artery.  She presents with the following performance deficits affecting function: weakness, impaired endurance, impaired self care skills, impaired functional mobilty, gait instability, impaired balance, decreased safety awareness, decreased coordination, impaired fine motor, impaired skin.  Pt demonstrates strength and ROM in (B) UE needed for ADLs that is WFL, with LUE slightly weaker than RUE.  While seated EOB pt able to perform grooming task with SBA.  During sit to stand transfer difficulty achieving full upright position noted with buckling and instability noted in LLE requiring knee and foot to be blocked.  Pt required Max A (with assist of 2) to perform sit to stand transfer and was unable to take steps this date.  PTA pt reports being (I) with all ADLs and mobility.  Pt is not at PLOF and would benefit from skilled OT services to address problems listed below and increase independence with ADLs.  Rehab is recommended upon d/c  "from acute care to further address deficits and help pt improve overall functional independence.       Rehab Prognosis:  Good; patient would benefit from acute skilled OT services to address these deficits and reach maximum level of function.         Clinical Decision Makin.  OT Mod:  "Pt evaluation falls under moderate complexity for evaluation coding due to identification of 3-5 performance deficits noted as stated above. Eval required Min/Mod assistance to complete on this date and detailed assessment(s) were utilized. Moreover, an expanded review of history and occupational profile obtained with additional review of cognitive, physical and psychosocial hx."     Plan:     Patient to be seen 4 x/week to address the above listed problems via self-care/home management, therapeutic activities, therapeutic exercises, neuromuscular re-education  · Plan of Care Expires: 07/10/18  · Plan of Care Reviewed with: patient, daughter, spouse    This Plan of care has been discussed with the patient who was involved in its development and understands and is in agreement with the identified goals and treatment plan    GOALS:    Occupational Therapy Goals        Problem: Occupational Therapy Goal    Goal Priority Disciplines Outcome Interventions   Occupational Therapy Goal     OT, PT/OT     Description:  Goals to be met by: 2018    Patient will increase functional independence with ADLs by performing:    UE Dressing with Contact Guard Assistance.  LE Dressing with Moderate Assistance.  Grooming while standing with Minimal Assistance.  Toileting from bedside commode with Minimal Assistance for hygiene and clothing management.   Sitting at edge of bed x 20 minutes with Stand by Assistance.  Supine to sit with Minimal Assistance.  Stand pivot transfers with Minimal Assistance.  Toilet transfer to bedside commode with Minimal Assistance.                      Time Tracking:     OT Date of Treatment: 06/10/18  OT Start " Time: 0930  OT Stop Time: 1010  OT Total Time (min): 40 min    Billable Minutes:Evaluation 40    SUSU Cline  6/10/2018

## 2018-06-10 NOTE — PLAN OF CARE
Problem: Occupational Therapy Goal  Goal: Occupational Therapy Goal  Goals to be met by: 6/20/2018    Patient will increase functional independence with ADLs by performing:    UE Dressing with Contact Guard Assistance.  LE Dressing with Moderate Assistance.  Grooming while standing with Minimal Assistance.  Toileting from bedside commode with Minimal Assistance for hygiene and clothing management.   Sitting at edge of bed x 20 minutes with Stand by Assistance.  Supine to sit with Minimal Assistance.  Stand pivot transfers with Minimal Assistance.  Toilet transfer to bedside commode with Minimal Assistance.      OT evaluation complete and POC established.  Rehab is recommended upon d/c from acute care to further address deficits and help pt improve overall functional independence.     SUSU Clien  6/10/2018

## 2018-06-10 NOTE — SUBJECTIVE & OBJECTIVE
Neurologic Chief Complaint: Right MCA stroke.     Subjective:     Interval History: Patient is seen for follow-up neurological assessment and treatment recommendations:   Patient is symptomatically improving after the thrombectomy.     HPI, Past Medical, Family, and Social History remains the same as documented in the initial encounter.     Review of Systems   Constitutional: Negative for fever.   Gastrointestinal: Positive for nausea and vomiting.   Neurological: Positive for facial asymmetry and weakness.     Scheduled Meds:   amLODIPine  5 mg Per NG tube Daily    aspirin  325 mg Per NG tube Daily    atorvastatin  40 mg Oral Daily    clobetasol 0.05%   Topical (Top) BID    heparin (porcine)  5,000 Units Subcutaneous Q8H    magnesium sulfate IVPB  3 g Intravenous Once    potassium chloride 10%  40 mEq Per NG tube Once    sodium chloride 0.9%  3 mL Intravenous Q8H     Continuous Infusions:   sodium chloride 0.9% 75 mL/hr at 06/10/18 1202     PRN Meds:    Objective:     Vital Signs (Most Recent):  Temp: 97.9 °F (36.6 °C) (06/10/18 1102)  Pulse: 63 (06/10/18 1200)  Resp: 20 (06/10/18 1200)  BP: (!) 146/65 (06/10/18 1200)  SpO2: 100 % (06/10/18 1200)  BP Location: Right arm    Vital Signs Range (Last 24H):  Temp:  [97.2 °F (36.2 °C)-97.9 °F (36.6 °C)]   Pulse:  [51-85]   Resp:  [6-50]   BP: ()/(54-98)   SpO2:  [95 %-100 %]   Arterial Line BP: (131-159)/(52-82)   BP Location: Right arm    Physical Exam   Constitutional: She appears well-developed and well-nourished. No distress.   HENT:   Head: Normocephalic and atraumatic.   Neck: Neck supple.   Cardiovascular: Normal rate.    No murmur heard.  Pulmonary/Chest: Effort normal.   Musculoskeletal: She exhibits no edema.   Neurological: She is alert. A cranial nerve deficit and sensory deficit is present. She exhibits abnormal muscle tone.   Skin: Skin is warm and dry.   Vitals reviewed.    Neurological Exam:   LOC: Alert  Attention Span: good  Language: No  aphasia  Articulation: Dysarthria  Visual Fields: intact   Facial Movement (CN VII): Upper & lower facial weakness on the Left  Motor: Arm left  Plegia 3/5  Leg left  Plegia 3/5  Arm right  Normal 5/5  Leg right Normal 5/5  Sensation: intact     Laboratory:  CBC:   Recent Labs  Lab 06/10/18  0250   WBC 10.45   RBC 3.73*   HGB 11.0*   HCT 34.0*      MCV 91   MCH 29.5   MCHC 32.4       Diagnostic Results     Brain Imaging/Vessel Imaging   MRI 6/10   Large right MCA territory acute infarct with evidence of hemorrhagic reconversion as above.  Subtle subarachnoid hemorrhage overlying right frontal sulci.    CTH/CTA 6/9   CT head: Acute right MCA distribution infarct with associated mild localized mass effect.  No significant midline shift or acute intracranial hemorrhage.  Superimposed generalized cerebral volume loss and chronic microvascular ischemic change.  CTA head: Abrupt termination of the distal M1 segment of the right MCA concerning for large vessel occlusion with good filling of distal branches via collateral vessels.  Mild bilateral carotid atherosclerotic plaque with less than 50% hemodynamically significant stenosis by NASCET criteria.    Cardiac Imaging   Echo is pending.

## 2018-06-11 PROBLEM — G93.6 CYTOTOXIC CEREBRAL EDEMA: Status: ACTIVE | Noted: 2018-06-11

## 2018-06-11 LAB
ALBUMIN SERPL BCP-MCNC: 3.1 G/DL
ALP SERPL-CCNC: 74 U/L
ALT SERPL W/O P-5'-P-CCNC: 26 U/L
ANION GAP SERPL CALC-SCNC: 9 MMOL/L
AST SERPL-CCNC: 28 U/L
BASOPHILS # BLD AUTO: 0.05 K/UL
BASOPHILS NFR BLD: 0.4 %
BILIRUB SERPL-MCNC: 0.7 MG/DL
BUN SERPL-MCNC: 10 MG/DL
CALCIUM SERPL-MCNC: 8.4 MG/DL
CHLORIDE SERPL-SCNC: 112 MMOL/L
CO2 SERPL-SCNC: 19 MMOL/L
CREAT SERPL-MCNC: 0.7 MG/DL
DIFFERENTIAL METHOD: ABNORMAL
EOSINOPHIL # BLD AUTO: 0.2 K/UL
EOSINOPHIL NFR BLD: 1.9 %
ERYTHROCYTE [DISTWIDTH] IN BLOOD BY AUTOMATED COUNT: 14.5 %
EST. GFR  (AFRICAN AMERICAN): >60 ML/MIN/1.73 M^2
EST. GFR  (NON AFRICAN AMERICAN): >60 ML/MIN/1.73 M^2
GLUCOSE SERPL-MCNC: 110 MG/DL
HCT VFR BLD AUTO: 34.6 %
HGB BLD-MCNC: 10.9 G/DL
IMM GRANULOCYTES # BLD AUTO: 0.07 K/UL
IMM GRANULOCYTES NFR BLD AUTO: 0.6 %
LYMPHOCYTES # BLD AUTO: 1.3 K/UL
LYMPHOCYTES NFR BLD: 11 %
MAGNESIUM SERPL-MCNC: 2 MG/DL
MCH RBC QN AUTO: 29.5 PG
MCHC RBC AUTO-ENTMCNC: 31.5 G/DL
MCV RBC AUTO: 94 FL
MONOCYTES # BLD AUTO: 1 K/UL
MONOCYTES NFR BLD: 8.8 %
NEUTROPHILS # BLD AUTO: 8.7 K/UL
NEUTROPHILS NFR BLD: 77.3 %
NRBC BLD-RTO: 0 /100 WBC
PHOSPHATE SERPL-MCNC: 2.5 MG/DL
PLATELET # BLD AUTO: 209 K/UL
PMV BLD AUTO: 9.4 FL
POCT GLUCOSE: 114 MG/DL (ref 70–110)
POCT GLUCOSE: 137 MG/DL (ref 70–110)
POCT GLUCOSE: 98 MG/DL (ref 70–110)
POTASSIUM SERPL-SCNC: 3.7 MMOL/L
PROT SERPL-MCNC: 6.1 G/DL
RBC # BLD AUTO: 3.7 M/UL
SODIUM SERPL-SCNC: 140 MMOL/L
WBC # BLD AUTO: 11.32 K/UL

## 2018-06-11 PROCEDURE — 83735 ASSAY OF MAGNESIUM: CPT

## 2018-06-11 PROCEDURE — 92526 ORAL FUNCTION THERAPY: CPT

## 2018-06-11 PROCEDURE — 99232 SBSQ HOSP IP/OBS MODERATE 35: CPT | Mod: ,,, | Performed by: PHYSICIAN ASSISTANT

## 2018-06-11 PROCEDURE — 99233 SBSQ HOSP IP/OBS HIGH 50: CPT | Mod: GC,,, | Performed by: PSYCHIATRY & NEUROLOGY

## 2018-06-11 PROCEDURE — 80053 COMPREHEN METABOLIC PANEL: CPT

## 2018-06-11 PROCEDURE — A4216 STERILE WATER/SALINE, 10 ML: HCPCS | Performed by: HOSPITALIST

## 2018-06-11 PROCEDURE — 92523 SPEECH SOUND LANG COMPREHEN: CPT

## 2018-06-11 PROCEDURE — 99232 SBSQ HOSP IP/OBS MODERATE 35: CPT | Mod: GC,,, | Performed by: NEUROLOGICAL SURGERY

## 2018-06-11 PROCEDURE — 84100 ASSAY OF PHOSPHORUS: CPT

## 2018-06-11 PROCEDURE — 85025 COMPLETE CBC W/AUTO DIFF WBC: CPT

## 2018-06-11 PROCEDURE — 25000003 PHARM REV CODE 250: Performed by: STUDENT IN AN ORGANIZED HEALTH CARE EDUCATION/TRAINING PROGRAM

## 2018-06-11 PROCEDURE — 25000003 PHARM REV CODE 250: Performed by: PHYSICIAN ASSISTANT

## 2018-06-11 PROCEDURE — 20000000 HC ICU ROOM

## 2018-06-11 PROCEDURE — 25000003 PHARM REV CODE 250: Performed by: HOSPITALIST

## 2018-06-11 PROCEDURE — 63600175 PHARM REV CODE 636 W HCPCS: Performed by: PHYSICIAN ASSISTANT

## 2018-06-11 RX ORDER — OLMESARTAN MEDOXOMIL AND HYDROCHLOROTHIAZIDE 40/25 40; 25 MG/1; MG/1
1 TABLET ORAL DAILY
Status: ON HOLD | COMMUNITY
End: 2018-06-15 | Stop reason: HOSPADM

## 2018-06-11 RX ORDER — ATORVASTATIN CALCIUM 20 MG/1
40 TABLET, FILM COATED ORAL DAILY
Status: DISCONTINUED | OUTPATIENT
Start: 2018-06-12 | End: 2018-06-13

## 2018-06-11 RX ORDER — LEVOCETIRIZINE DIHYDROCHLORIDE 5 MG/1
5 TABLET, FILM COATED ORAL NIGHTLY
COMMUNITY
End: 2018-10-19

## 2018-06-11 RX ADMIN — CLOBETASOL PROPIONATE: 0.5 OINTMENT TOPICAL at 09:06

## 2018-06-11 RX ADMIN — Medication 3 ML: at 10:06

## 2018-06-11 RX ADMIN — ASPIRIN 325 MG ORAL TABLET 325 MG: 325 PILL ORAL at 08:06

## 2018-06-11 RX ADMIN — Medication 3 ML: at 04:06

## 2018-06-11 RX ADMIN — ATORVASTATIN CALCIUM 40 MG: 20 TABLET, FILM COATED ORAL at 08:06

## 2018-06-11 RX ADMIN — HEPARIN SODIUM 5000 UNITS: 5000 INJECTION, SOLUTION INTRAVENOUS; SUBCUTANEOUS at 06:06

## 2018-06-11 RX ADMIN — Medication 3 ML: at 06:06

## 2018-06-11 RX ADMIN — HEPARIN SODIUM 5000 UNITS: 5000 INJECTION, SOLUTION INTRAVENOUS; SUBCUTANEOUS at 10:06

## 2018-06-11 RX ADMIN — HEPARIN SODIUM 5000 UNITS: 5000 INJECTION, SOLUTION INTRAVENOUS; SUBCUTANEOUS at 04:06

## 2018-06-11 RX ADMIN — AMLODIPINE BESYLATE 5 MG: 5 TABLET ORAL at 08:06

## 2018-06-11 RX ADMIN — CLOBETASOL PROPIONATE: 0.5 OINTMENT TOPICAL at 08:06

## 2018-06-11 NOTE — SUBJECTIVE & OBJECTIVE
Interval History: POD1 angio for thrombectomy    Medications:  Continuous Infusions:  Scheduled Meds:   amLODIPine  5 mg Per NG tube Daily    aspirin  325 mg Per NG tube Daily    [START ON 6/12/2018] atorvastatin  40 mg Per NG tube Daily    clobetasol 0.05%   Topical (Top) BID    heparin (porcine)  5,000 Units Subcutaneous Q8H    sodium chloride 0.9%  3 mL Intravenous Q8H     PRN Meds:     Review of Systems  Objective:     Weight: 75.8 kg (167 lb 1.7 oz)  Body mass index is 31.96 kg/m².  Vital Signs (Most Recent):  Temp: 99 °F (37.2 °C) (06/11/18 1101)  Pulse: 62 (06/11/18 1301)  Resp: 16 (06/11/18 1301)  BP: (!) 154/73 (06/11/18 1301)  SpO2: 97 % (06/11/18 1301) Vital Signs (24h Range):  Temp:  [32 °F (0 °C)-99 °F (37.2 °C)] 99 °F (37.2 °C)  Pulse:  [53-83] 62  Resp:  [13-31] 16  SpO2:  [94 %-100 %] 97 %  BP: (140-169)/(63-74) 154/73       Date 06/11/18 0700 - 06/12/18 0659   Shift 8070-0315 7378-8245 1642-8668 24 Hour Total   I  N  T  A  K  E   I.V.  (mL/kg) 375  (4.9)   375  (4.9)    NG/GT 30   30    Shift Total  (mL/kg) 405  (5.3)   405  (5.3)   O  U  T  P  U  T   Urine  (mL/kg/hr) 500   500    Shift Total  (mL/kg) 500  (6.6)   500  (6.6)   Weight (kg) 75.8 75.8 75.8 75.8                        NG/OG Tube 06/10/18 1600 Right nostril (Active)   Placement Check placement verified by distal tube length measurement;placement verified by x-ray;placement verified by aspirate characteristics 6/11/2018 11:01 AM   Distal Tube Length (cm) 65 6/11/2018 11:01 AM   Tolerance no signs/symptoms of discomfort 6/11/2018 11:01 AM   Securement taped to nostril center 6/11/2018 11:01 AM   Clamp Status/Tolerance clamped;no abdominal discomfort;no abdominal distention;no emesis;no nausea;no residual;no restlessness 6/11/2018 11:01 AM   Suction Setting/Drainage Method dependent drainage 6/10/2018  4:00 PM   Insertion Site Appearance no redness, warmth, tenderness, skin breakdown, drainage 6/11/2018 11:01 AM   Drainage None  6/11/2018 11:01 AM   Current Rate (mL/hr) 0 mL/hr 6/11/2018 11:01 AM   Intake (mL) 60 mL 6/10/2018  4:30 PM   Intake (mL) - Formula Tube Feeding 20 6/11/2018  1:01 PM   Residual Amount (ml) 0 ml 6/11/2018  7:01 AM            Sheath 06/09/18 1522 Right (Active)   Insertion Site no hematoma;occlusive dressing intact;clean and dry 6/11/2018 11:01 AM   Drainage Characteristics/Odor No odor 6/9/2018  4:00 PM   Drainage Amount None 6/11/2018  7:01 AM       Female External Urinary Catheter 06/09/18 1600 (Active)   Skin no redness;no breakdown 6/11/2018 11:01 AM   Tolerance no signs/symptoms of discomfort 6/11/2018 11:01 AM   Suction Continuous suction at 40 mmHg 6/11/2018 11:01 AM   Date of last wick change 06/11/18 6/11/2018 11:01 AM   Time of last wick change 0600 6/11/2018 11:01 AM   Output (mL) 500 mL 6/11/2018  7:01 AM       Neurosurgery Physical Exam   AAOx4  Dysarthric  LUE/LLE 4/5  RUE/RLE 5/5    Significant Labs:    Recent Labs  Lab 06/10/18  0250 06/11/18  0210    110    140   K 3.4* 3.7   * 112*   CO2 20* 19*   BUN 15 10   CREATININE 0.8 0.7   CALCIUM 7.9* 8.4*   MG 1.5* 2.0       Recent Labs  Lab 06/10/18  0250 06/11/18  0210   WBC 10.45 11.32   HGB 11.0* 10.9*   HCT 34.0* 34.6*    209     No results for input(s): LABPT, INR, APTT in the last 48 hours.  Microbiology Results (last 7 days)     ** No results found for the last 168 hours. **        All pertinent labs from the last 24 hours have been reviewed.    Significant Diagnostics:  I have reviewed all pertinent imaging results/findings within the past 24 hours.

## 2018-06-11 NOTE — PROGRESS NOTES
[facility]  Vascular Neurology  Comprehensive Stroke Center  Progress Note    Assessment/Plan:     * Embolic stroke involving right middle cerebral artery    R MCA syndrome, s/p IV tpA in ED. Dense left hemiplegia, gaze deviation to the right w/ limited visual field to the left. CT scan relatively unchanged, faint evidence of hyperdense mid/distal R M1.   NIHSS 21 on arrival.   Patient was taken to IR for thrombectomy around 2:30 pm.  Site of Occlusion: Right MCA  TICI Score: 3  9/11: Patient is improving clinically.      Antithrombotics for secondary stroke prevention: Antiplatelets: None: Hold all Antithrombotics x 24 hours after IV t-PA administration     Statins for secondary stroke prevention and hyperlipidemia, if present:   Statins: Atorvastatin- 40 mg daily     Aggressive risk factor modification: None     Rehab efforts: PT/OT/SLP to evaluate and treat     Diagnostics ordered/pending:  HgbA1C to assess blood glucose levels, Lipid Profile to assess cholesterol levels, MRI head without contrast to assess brain parenchyma, TTE to assess cardiac function/status      VTE prophylaxis: None: Reason for No Pharmacological VTE Prophylaxis: thrombolysis    BP parameters: Infarct: Post sucessful thrombectomy, SBP <140            Benign essential HTN    She is on amlodipine and olmesartan at home.   SBP goal is <140.              Patient was transferred from OSH with Right MCA stroke. She received tPA. She is going to IR for thrombectomy.   9/10 she had small area of hemorrhagic conversion. She is clinically improving. She failed her swallow eval.     STROKE DOCUMENTATION   Acute Stroke Times   Last Known Normal Date: 06/09/18  Symptom Onset Date: 06/09/18  Stroke Team Called Date: 06/09/18  Stroke Team Arrival Date: 06/09/18  CT Interpretation Time: 1411  Decision to Treat Time for IR: 1430    NIH Scale:  1a. Level Of Consciousness: 0-->Alert: keenly responsive  1b. LOC Questions: 0-->Answers both  questions correctly  1c. LOC Commands: 0-->Performs both tasks correctly  2. Best Gaze: 1-->Partial gaze palsy: gaze is abnormal in one or both eyes, but forced deviation or total gaze paresis is not present  3. Visual: 0-->No visual loss  4. Facial Palsy: 2-->Partial paralysis (total or near-total paralysis of lower face)  5a. Motor Arm, Left: 0-->No drift: limb holds 90 (or 45) degrees for full 10 secs  5b. Motor Arm, Right: 0-->No drift: limb holds 90 (or 45) degrees for full 10 secs  6a. Motor Leg, Left: 0-->No drift: leg holds 30 degree position for full 5 secs  6b. Motor Leg, Right: 0-->No drift: leg holds 30 degree position for full 5 secs  7. Limb Ataxia: 0-->Absent  8. Sensory: 0-->Normal: no sensory loss  9. Best Language: 0-->No aphasia: normal  10. Dysarthria: 1-->Mild-to-moderate dysarthria: patient slurs at least some words and, at worst, can be understood with some difficulty  11. Extinction and Inattention (formerly Neglect): 0-->No abnormality  Total (NIH Stroke Scale): 4       Modified Burnett    Denzel Coma Scale:    ABCD2 Score:    [id number] Score:   HAS -BLED Score:   ICH Score:   Hunt & Morales Classification:      Hemorrhagic change of an Ischemic Stroke: Does this patient have an ischemic stroke with hemorrhagic changes? Yes, Grading Scale: PH Type 1 (PH-1) = hematoma in < 30% of the infarcted area with some slight space-occupying effect. Is this a symptomatic change?  No - Hemorrhage is not clinically significant     Neurologic Chief Complaint: Right MCA stroke.     Subjective:     Interval History: Patient is seen for follow-up neurological assessment and treatment recommendations:   Patient has improved significantly despite developing a small area of hemorrhagic conversion. She has regained most of her strength back. She is still having facial weakness and difficulty swallowing.       HPI, Past Medical, Family, and Social History remains the same as documented in the initial encounter.      Review of Systems   Constitutional: Negative for fever.   HENT: Positive for trouble swallowing.    Gastrointestinal: Negative for nausea and vomiting.   Neurological: Positive for facial asymmetry. Negative for weakness.     Scheduled Meds:   amLODIPine  5 mg Per NG tube Daily    aspirin  325 mg Per NG tube Daily    atorvastatin  40 mg Oral Daily    clobetasol 0.05%   Topical (Top) BID    heparin (porcine)  5,000 Units Subcutaneous Q8H    magnesium sulfate IVPB  3 g Intravenous Once    potassium chloride 10%  40 mEq Per NG tube Once    sodium chloride 0.9%  3 mL Intravenous Q8H     Continuous Infusions:   sodium chloride 0.9% 75 mL/hr at 06/10/18 1402     PRN Meds:    Objective:     Vital Signs (Most Recent):  Temp: 97.9 °F (36.6 °C) (06/10/18 1102)  Pulse: 83 (06/10/18 1400)  Resp: 20 (06/10/18 1400)  BP: (!) 150/65 (06/10/18 1400)  SpO2: 99 % (06/10/18 1400)  BP Location: Right arm    Vital Signs Range (Last 24H):  Temp:  [97.2 °F (36.2 °C)-97.9 °F (36.6 °C)]   Pulse:  [51-85]   Resp:  [6-50]   BP: ()/(54-98)   SpO2:  [95 %-100 %]   Arterial Line BP: (131-159)/(52-82)   BP Location: Right arm    Physical Exam   Constitutional: She appears well-developed and well-nourished. No distress.   HENT:   Head: Normocephalic and atraumatic.   Neck: Neck supple.   Cardiovascular: Normal rate.    No murmur heard.  Pulmonary/Chest: Effort normal.   Musculoskeletal: She exhibits no edema.   Neurological: She is alert. A cranial nerve deficit and sensory deficit is present. She exhibits abnormal muscle tone.   Skin: Skin is warm and dry.   Vitals reviewed.    Neurological Exam:   LOC: Alert  Attention Span: good  Language: No aphasia  Articulation: moderate Dysarthria  Visual Fields: intact   Facial Movement (CN VII): Upper < lower facial weakness on the Left  Motor: Arm left  Plegia  +4/5  Leg left  Plegia  +4/5  Arm right  Normal 5/5  Leg right Normal 5/5  Sensation: intact     Laboratory:  CBC:     Recent  Labs  Lab 06/10/18  0250   WBC 10.45   RBC 3.73*   HGB 11.0*   HCT 34.0*      MCV 91   MCH 29.5   MCHC 32.4       Diagnostic Results     Brain Imaging/Vessel Imaging   MRI 6/10   Large right MCA territory acute infarct with evidence of hemorrhagic reconversion as above.  Subtle subarachnoid hemorrhage overlying right frontal sulci.    CTH/CTA 6/9   CT head: Acute right MCA distribution infarct with associated mild localized mass effect.  No significant midline shift or acute intracranial hemorrhage.  Superimposed generalized cerebral volume loss and chronic microvascular ischemic change.  CTA head: Abrupt termination of the distal M1 segment of the right MCA concerning for large vessel occlusion with good filling of distal branches via collateral vessels.  Mild bilateral carotid atherosclerotic plaque with less than 50% hemodynamically significant stenosis by NASCET criteria.    Cardiac Imaging   CONCLUSIONS     1 - Moderate left atrial enlargement.     2 - Normal left ventricular systolic function (EF 60-65%).     3 - No wall motion abnormalities.     4 - Normal left ventricular diastolic function.     5 - Normal right ventricular systolic function .     6 - Mild to moderate aortic stenosis, ESTELITA = 1.17 cm2, AVAi = 0.68 cm2/m2, peak velocity = 2.75 m/s, mean gradient = 17 mmHg.     7 - Mild aortic regurgitation.     8 - The estimated PA systolic pressure is 25 mmHg.     9 - Mild mitral regurgitation.       Marga Rae MD  Comprehensive Stroke Center  Department of Vascular Neurology   [facility]

## 2018-06-11 NOTE — PT/OT/SLP EVAL
"Speech Language Pathology Evaluation  Cognitive Communication  Dysphagia Therapy    Patient Name:  Isa Sewell   MRN:  [id number]   [id number] A     Admitting Diagnosis: Embolic stroke involving right middle cerebral artery    Recommendations:     Recommendations:                General Recommendations:  Dysphagia therapy and Speech/language therapy  Diet recommendations:  Pleasure Feeding: Puree, Honey Thick 3-5 bites/sips an hour  Aspiration Precautions:   · 1 bite/sip at a time,   · Alternate means of nutrition/hydration,   · Feed only when awake/alert,   · Frequent oral care,   · HOB to 90 degrees,   · Puree for pleasure,   · Remain upright 30 minutes post meal,   · Small bites/sips and   · Strict aspiration precautions   · Continue to monitor for signs and symptoms of aspiration and discontinue oral feeding should you notice any of the following: watery eyes, reddened facial area, wet vocal quality, increased work of breathing, change in respiratory status, increased congestion, coughing, fever, etc.  General Precautions: Standard, aspiration, fall, pureed diet, honey thick  Communication strategies:  none    History:     Past Medical History:   Diagnosis Date    Bullous pemphigoid     pemphigoid nodularis variant    DVT (deep venous thrombosis)     Hypertension        Past Surgical History:   Procedure Laterality Date    BREAST BIOPSY Bilateral     BOTH BENIGN    CHOLECYSTECTOMY      FRACTURE SURGERY Right     ankle    HYSTERECTOMY      TONSILLECTOMY       MD Note: "Chief Complaint: Embolic stroke involving right middle cerebral artery  History of Present Illness: Ms. Sewell is a 87 y/o female with PMH significant for HTN and bullous pemphigoid who presents to [facility] s/p tPA and R MCA thrombectomy. Patient developed acute onset LSW at 1130 am. Patient received TPA at 1245 pm and was then transferred to [facility] for thrombectomy. Patient had successful thrombectomy of R MCA with TICI 3 after two passes. " "  Hospital Course: 6/10: The patient had improvement in her dysarthria and L sided strength. She did not pass the bedside swallowing assessment.  S/P rTPA and R-M1 thrombectomy with TICI 3 flow. Her sheath is off. R MCA partial stroke inviolving the right  insular cortex and small hemorrhagic transformation."     Subjective     Patient awake and cooperative with family present in room (, daughters, and granddaughter). RN assisted SLP in repositioning patient.   Patient goals: none stated  Patient and family report throat clearing throughout the day at baseline 2/2 'sinus drip.'  Family states, "That's not her normal throat clear. That's different." -s/p initial thin liquid trial    Pain/Comfort:  · Pain Rating 1: 0/10    Objective:     COGNITIVE STATUS:  Behavioral Observations: alert and cooperative  Memory:  · Immediate: % accuracy with all number and word lists  · Short Term: WFL recalled 3/3 words s/p a 5 minute filled delay   · Long Term: % accuracy  Orientation: Oriented x4   Attention: WFL  Problem Solving: % accuracy  Insight Awareness: pt with good insight   Sequencing: % accuracy with 4 word mental manipulation task; pt with accurate sequencing of functional events  Pragmatics: WNL  Money/time management: WFL    LANGUAGE:    Receptive Language:   Complex y/n Questions: % accuracy  Identification: % accuracy  1 Step Directions: % accuracy  2 Step Directions: % accuracy    Expressive Language:  Naming: named 15 animals in 1 minute (norm 15-20)  Automatic Speech: % accuracy  Sentence Completion: % accuracy  Responsive Speech: % accuracy  Repetition: % accuracy    Motor Speech: Mild Dysarthria    Voice: adequate volumate, rate, prosody, breath support    Augmentative Alternative Communication: no     Treatment: Patient seen for ongoing swallowing assessment. Patient assessed with clinician fed ice chips x2, tsp sip of " water x1, tsp sip of nectar thick water x2, tsp sip of honey thick water x3, tsp bites of apple sauce x6. She presented with an immediate throat clear/cough, strained vocal quality, delayed pharyngeal swallow, and watery eyes following thin and nectar thick liquids. No overt s/s of aspiration noted with clinician fed honey thick liquids or puree trials. Patient's daughter began feeding patient 1/2 tsp bites of apple sauce x5. Patient with noted watery eyes and multiple swallows following puree trials. SLP recommendation for pleasure feedings only with continued alterative means of nutrition/hydration/medicaiton via NG tube. Education provided on pleasure feeding recommendations, how to achieve honey thick consistency, SLP role, s/s and risks of aspiraiton, safe swallow precautions, and POC. Patient and family verbalized understanding of all discussed. Effortful swallows and Chin Tuck Against resistance exercise introduced this date. Patient demonstrated understanding via teach back of exercises. SLP provided educaiton on the importance of independent completion of all exercises. She verbalized understanding. White board updated.     Assessment:   Isa Sewell is a 87 y.o. female with an SLP diagnosis of Dysphagia and Dysarthria. ST will continue to follow.     Goals:    SLP Goals        Problem: SLP Goal    Goal Priority Disciplines Outcome   SLP Goal     SLP Ongoing (interventions implemented as appropriate)   Description:  Speech Language Pathology Goals  Goals expected to be met by 6/17  1. Pt will participate in ongoing assessment of swallow.   2. Pt will complete speech, language, cognitive evaluation to determine need for tx. -MET 6/11  3. Pt will complete dysphagia therapy exercises x10 each given min cues.   4. Pt will utilize speech strategies in conversation to improve overall intelligibility.                             Plan:   · Patient to be seen:  5 x/week   · Plan of Care expires:   07/10/18  · Plan of Care reviewed with:  patient, family   · SLP Follow-Up:  Yes       Discharge recommendations:  Discharge Facility/Level Of Care Needs:  (pending PT/OT)   Barriers to Discharge:  Level of Skilled Assistance Needed      Time Tracking:   SLP Treatment Date:   06/11/18  Speech Start Time:  1005  Speech Stop Time:  1040     Speech Total Time (min):  35 min    Billable Minutes: Eval 12  and Treatment Swallowing Dysfunction 23    JORGE Henry, CCC-SLP   Pager: [phone]  06/11/2018

## 2018-06-11 NOTE — PLAN OF CARE
Pt is an 87 year old female, lives at home with her  and adult dtg. Prior to admit pt was independent with ADLs and very active. No HH or DME. Upon d/c, pt would have transportation home and assistance from family is needed.    PAYOR:   Medicare Part A & B  [facility]    PCP:   Dr. Dariel Obregon   [address] [id number] [phone]    PHARMACY:  [facility]  [facility] [address] [place] [address] [phone]       06/11/18 1217   Discharge Assessment   Assessment Type Discharge Planning Assessment   Confirmed/corrected address and phone number on facesheet? Yes   Assessment information obtained from? Other  ( Syd Sewell and dtg Melanie Prerna)   Communicated expected length of stay with patient/caregiver no   Prior to hospitilization cognitive status: Alert/Oriented   Prior to hospitalization functional status: Independent   Current Functional Status: (chelsey)   Lives With spouse;child(shlomo), adult   Able to Return to Prior Arrangements unable to determine at this time (comments)   Is patient able to care for self after discharge? Unable to determine at this time (comments)   Who are your caregiver(s) and their phone number(s)?  [phone], Dtg Melanie [phone]   Patient's perception of discharge disposition other (comments)  (open to whatever is recommended)   Readmission Within The Last 30 Days no previous admission in last 30 days   Patient currently being followed by outpatient case management? No   Patient currently receives any other outside agency services? No   Equipment Currently Used at Home none   Do you have any problems affording any of your prescribed medications? No   Is the patient taking medications as prescribed? yes   Does the patient have transportation home? Yes   Transportation Available family or friend will provide   Does the patient receive services at the Coumadin Clinic? No   Discharge Plan A Rehab    Discharge Plan B Skilled Nursing Facility   Patient/Family In Agreement With Plan yes

## 2018-06-11 NOTE — PROGRESS NOTES
[facility]  Neurosurgery  Progress Note    Subjective:     History of Present Illness: Ms Sewell is a 86F that presents as transfer from OSH after acute onset of hemiplegia at 1300, witnessed by family. tPA given at OSH, decision by ED at [facility]. Transferred for possible for thrombectomy      Post-Op Info:  * No surgery found *         Interval History: POD1 angio for thrombectomy    Medications:  Continuous Infusions:  Scheduled Meds:   amLODIPine  5 mg Per NG tube Daily    aspirin  325 mg Per NG tube Daily    [START ON 6/12/2018] atorvastatin  40 mg Per NG tube Daily    clobetasol 0.05%   Topical (Top) BID    heparin (porcine)  5,000 Units Subcutaneous Q8H    sodium chloride 0.9%  3 mL Intravenous Q8H     PRN Meds:     Review of Systems  Objective:     Weight: 75.8 kg (167 lb 1.7 oz)  Body mass index is 31.96 kg/m².  Vital Signs (Most Recent):  Temp: 99 °F (37.2 °C) (06/11/18 1101)  Pulse: 62 (06/11/18 1301)  Resp: 16 (06/11/18 1301)  BP: (!) 154/73 (06/11/18 1301)  SpO2: 97 % (06/11/18 1301) Vital Signs (24h Range):  Temp:  [32 °F (0 °C)-99 °F (37.2 °C)] 99 °F (37.2 °C)  Pulse:  [53-83] 62  Resp:  [13-31] 16  SpO2:  [94 %-100 %] 97 %  BP: (140-169)/(63-74) 154/73       Date 06/11/18 0700 - 06/12/18 0659   Shift 5573-2409 8560-2746 1995-5969 24 Hour Total   I  N  T  A  K  E   I.V.  (mL/kg) 375  (4.9)   375  (4.9)    NG/GT 30   30    Shift Total  (mL/kg) 405  (5.3)   405  (5.3)   O  U  T  P  U  T   Urine  (mL/kg/hr) 500   500    Shift Total  (mL/kg) 500  (6.6)   500  (6.6)   Weight (kg) 75.8 75.8 75.8 75.8                        NG/OG Tube 06/10/18 1600 Right nostril (Active)   Placement Check placement verified by distal tube length measurement;placement verified by x-ray;placement verified by aspirate characteristics 6/11/2018 11:01 AM   Distal Tube Length (cm) 65 6/11/2018 11:01 AM   Tolerance no signs/symptoms of discomfort 6/11/2018 11:01 AM   Securement taped to nostril  center 6/11/2018 11:01 AM   Clamp Status/Tolerance clamped;no abdominal discomfort;no abdominal distention;no emesis;no nausea;no residual;no restlessness 6/11/2018 11:01 AM   Suction Setting/Drainage Method dependent drainage 6/10/2018  4:00 PM   Insertion Site Appearance no redness, warmth, tenderness, skin breakdown, drainage 6/11/2018 11:01 AM   Drainage None 6/11/2018 11:01 AM   Current Rate (mL/hr) 0 mL/hr 6/11/2018 11:01 AM   Intake (mL) 60 mL 6/10/2018  4:30 PM   Intake (mL) - Formula Tube Feeding 20 6/11/2018  1:01 PM   Residual Amount (ml) 0 ml 6/11/2018  7:01 AM            Sheath 06/09/18 1522 Right (Active)   Insertion Site no hematoma;occlusive dressing intact;clean and dry 6/11/2018 11:01 AM   Drainage Characteristics/Odor No odor 6/9/2018  4:00 PM   Drainage Amount None 6/11/2018  7:01 AM       Female External Urinary Catheter 06/09/18 1600 (Active)   Skin no redness;no breakdown 6/11/2018 11:01 AM   Tolerance no signs/symptoms of discomfort 6/11/2018 11:01 AM   Suction Continuous suction at 40 mmHg 6/11/2018 11:01 AM   Date of last wick change 06/11/18 6/11/2018 11:01 AM   Time of last wick change 0600 6/11/2018 11:01 AM   Output (mL) 500 mL 6/11/2018  7:01 AM       Neurosurgery Physical Exam   AAOx4  Dysarthric  LUE/LLE 4/5  RUE/RLE 5/5    Significant Labs:    Recent Labs  Lab 06/10/18  0250 06/11/18  0210    110    140   K 3.4* 3.7   * 112*   CO2 20* 19*   BUN 15 10   CREATININE 0.8 0.7   CALCIUM 7.9* 8.4*   MG 1.5* 2.0       Recent Labs  Lab 06/10/18  0250 06/11/18  0210   WBC 10.45 11.32   HGB 11.0* 10.9*   HCT 34.0* 34.6*    209     No results for input(s): LABPT, INR, APTT in the last 48 hours.  Microbiology Results (last 7 days)     ** No results found for the last 168 hours. **        All pertinent labs from the last 24 hours have been reviewed.    Significant Diagnostics:  I have reviewed all pertinent imaging results/findings within the past 24  hours.    Assessment/Plan:     * Embolic stroke involving right middle cerebral artery    86F with R MCA occlusion, s/p TICI 3 thrombectomy.    -exam improving  -groin and pulses stable  -medical management per NCC and stroke neurology  -will f/u MRI            Hardeep Dorsey MD  Neurosurgery  [facility]

## 2018-06-11 NOTE — PROGRESS NOTES
[facility]  Neurosurgery  Progress Note    Subjective:     History of Present Illness: Ms Sewell is a 86F that presents as transfer from OSH after acute onset of hemiplegia at 1300, witnessed by family. tPA given at OSH, decision by ED at [facility]. Transferred for possible for thrombectomy      Post-Op Info:  * No surgery found *         Interval History: NAEON.    Medications:  Continuous Infusions:  Scheduled Meds:   amLODIPine  5 mg Per NG tube Daily    aspirin  325 mg Per NG tube Daily    [START ON 6/12/2018] atorvastatin  40 mg Per NG tube Daily    clobetasol 0.05%   Topical (Top) BID    heparin (porcine)  5,000 Units Subcutaneous Q8H    sodium chloride 0.9%  3 mL Intravenous Q8H     PRN Meds:     Review of Systems    Objective:     Weight: 75.8 kg (167 lb 1.7 oz)  Body mass index is 31.96 kg/m².  Vital Signs (Most Recent):  Temp: 99 °F (37.2 °C) (06/11/18 1101)  Pulse: 62 (06/11/18 1301)  Resp: 16 (06/11/18 1301)  BP: (!) 154/73 (06/11/18 1301)  SpO2: 97 % (06/11/18 1301) Vital Signs (24h Range):  Temp:  [32 °F (0 °C)-99 °F (37.2 °C)] 99 °F (37.2 °C)  Pulse:  [53-83] 62  Resp:  [13-31] 16  SpO2:  [94 %-100 %] 97 %  BP: (140-169)/(63-74) 154/73       Date 06/11/18 0700 - 06/12/18 0659   Shift 0917-3669 7534-5743 9914-0354 24 Hour Total   I  N  T  A  K  E   I.V.  (mL/kg) 375  (4.9)   375  (4.9)    NG/GT 30   30    Shift Total  (mL/kg) 405  (5.3)   405  (5.3)   O  U  T  P  U  T   Urine  (mL/kg/hr) 500   500    Shift Total  (mL/kg) 500  (6.6)   500  (6.6)   Weight (kg) 75.8 75.8 75.8 75.8            NG/OG Tube 06/10/18 1600 Right nostril (Active)   Placement Check placement verified by distal tube length measurement;placement verified by x-ray;placement verified by aspirate characteristics 6/11/2018 11:01 AM   Distal Tube Length (cm) 65 6/11/2018 11:01 AM   Tolerance no signs/symptoms of discomfort 6/11/2018 11:01 AM   Securement taped to nostril center 6/11/2018 11:01 AM    Clamp Status/Tolerance clamped;no abdominal discomfort;no abdominal distention;no emesis;no nausea;no residual;no restlessness 6/11/2018 11:01 AM   Suction Setting/Drainage Method dependent drainage 6/10/2018  4:00 PM   Insertion Site Appearance no redness, warmth, tenderness, skin breakdown, drainage 6/11/2018 11:01 AM   Drainage None 6/11/2018 11:01 AM   Current Rate (mL/hr) 0 mL/hr 6/11/2018 11:01 AM   Intake (mL) 60 mL 6/10/2018  4:30 PM   Intake (mL) - Formula Tube Feeding 20 6/11/2018  1:01 PM   Residual Amount (ml) 0 ml 6/11/2018  7:01 AM            Sheath 06/09/18 1522 Right (Active)   Insertion Site no hematoma;occlusive dressing intact;clean and dry 6/11/2018 11:01 AM   Drainage Characteristics/Odor No odor 6/9/2018  4:00 PM   Drainage Amount None 6/11/2018  7:01 AM       Female External Urinary Catheter 06/09/18 1600 (Active)   Skin no redness;no breakdown 6/11/2018 11:01 AM   Tolerance no signs/symptoms of discomfort 6/11/2018 11:01 AM   Suction Continuous suction at 40 mmHg 6/11/2018 11:01 AM   Date of last wick change 06/11/18 6/11/2018 11:01 AM   Time of last wick change 0600 6/11/2018 11:01 AM   Output (mL) 500 mL 6/11/2018  7:01 AM       Neurosurgery Physical Exam     AAOx4  Dysarthric  LUE/LLE 4/5  RUE/RLE 5/5    Significant Labs:    Recent Labs  Lab 06/10/18  0250 06/11/18  0210    110    140   K 3.4* 3.7   * 112*   CO2 20* 19*   BUN 15 10   CREATININE 0.8 0.7   CALCIUM 7.9* 8.4*   MG 1.5* 2.0       Recent Labs  Lab 06/10/18  0250 06/11/18  0210   WBC 10.45 11.32   HGB 11.0* 10.9*   HCT 34.0* 34.6*    209     No results for input(s): LABPT, INR, APTT in the last 48 hours.  Microbiology Results (last 7 days)     ** No results found for the last 168 hours. **        All pertinent labs from the last 24 hours have been reviewed.    Significant Diagnostics:  I have reviewed all pertinent imaging results/findings within the past 24 hours.    Assessment/Plan:     * Embolic  stroke involving right middle cerebral artery    86F with R MCA occlusion, s/p TICI 3 thrombectomy.    -Neurochecks q1h  -groin and pulses stable  -medical management per NCC and stroke neurology  -Pt will need 2w follow up in neurosurgery clinic  -No further neurosurgical needs at this time, will sign off, please call with questions.               Luis Eduardo Sahu MD  Neurosurgery  [facility]

## 2018-06-11 NOTE — PLAN OF CARE
Problem: SLP Goal  Goal: SLP Goal  Speech Language Pathology Goals  Goals expected to be met by 6/17  1. Pt will participate in ongoing assessment of swallow.   2. Pt will complete speech, language, cognitive evaluation to determine need for tx. -MET 6/11  3. Pt will complete dysphagia therapy exercises x10 each given min cues.          Outcome: Ongoing (interventions implemented as appropriate)  Patient seen for an ongoing assessment of swallowing function and Eeuziw-Bxaaejgu-Lzhgiecxz Evaluation completed. Recommend: Continue alternative means of nutrition/hydration/medicaiton, pleasure feedings 3-5 bites/sips an hour: puree, honey thick liquids, strict aspiration precautions. ST will continue to follow.

## 2018-06-11 NOTE — HOSPITAL COURSE
6/10: The patient had improvement in her dysarthria and L sided strength. She did not pass the bedside swallowing assessment.  S/P rTPA and R-M1 thrombectomy with TICI 3 flow. Her sheath is off. R MCA partial stroke inviolving the right  insular cortex and small hemorrhagic transformation.   6/11: Boarding for bed on neuro stepdown   6/12: Boarding for bed on neuro stepdown. MBSS ordered

## 2018-06-11 NOTE — PLAN OF CARE
SW met with Pt and Pt family at bedside. Discussed therapy recs for rehab and provided list. Addressed questions. Family will tour a few places and let SW know of choices asap.    Rhona Rausch, VASHTI  Neurocritical Care   [facility]  [id number]

## 2018-06-11 NOTE — ASSESSMENT & PLAN NOTE
86F with R MCA occlusion, s/p TICI 3 thrombectomy.    -Neurochecks q1h  -groin and pulses stable  -medical management per NCC and stroke neurology  -Pt will need 2w follow up in neurosurgery clinic  -No further neurosurgical needs at this time, will sign off, please call with questions.

## 2018-06-11 NOTE — ASSESSMENT & PLAN NOTE
86F with R MCA occlusion, s/p TICI 3 thrombectomy.    -exam improving  -groin and pulses stable  -medical management per NCC and stroke neurology  -will f/u MRI

## 2018-06-11 NOTE — ASSESSMENT & PLAN NOTE
R MCA syndrome, s/p IV tpA in ED. Dense left hemiplegia, gaze deviation to the right w/ limited visual field to the left. CT scan relatively unchanged, faint evidence of hyperdense mid/distal R M1.   NIHSS 21 on arrival.   Patient was taken to IR for thrombectomy around 2:30 pm.  Site of Occlusion: Right MCA  TICI Score: 3  9/11: Patient is improving clinically.      Antithrombotics for secondary stroke prevention: Antiplatelets: None: Hold all Antithrombotics x 24 hours after IV t-PA administration     Statins for secondary stroke prevention and hyperlipidemia, if present:   Statins: Atorvastatin- 40 mg daily     Aggressive risk factor modification: None     Rehab efforts: PT/OT/SLP to evaluate and treat     Diagnostics ordered/pending:  HgbA1C to assess blood glucose levels, Lipid Profile to assess cholesterol levels, MRI head without contrast to assess brain parenchyma, TTE to assess cardiac function/status      VTE prophylaxis: None: Reason for No Pharmacological VTE Prophylaxis: thrombolysis    BP parameters: Infarct: Post sucessful thrombectomy, SBP <140

## 2018-06-11 NOTE — PROGRESS NOTES
[facility]  Neurocritical Care  Progress Note    Admit Date: 6/9/2018  Service Date: 06/10/2018  Length of Stay: 1    Subjective:     Chief Complaint: Embolic stroke involving right middle cerebral artery    History of Present Illness: Ms. Sewell is a 87 y/o female with PMH significant for HTN and bullous pemphigoid who presents to [facility] s/p tPA and R MCA thrombectomy. Patient developed acute onset LSW at 1130 am. Patient received TPA at 1245 pm and was then transferred to [facility] for thrombectomy. Patient had successful thrombectomy of R MCA with TICI 3 after two passes.     Hospital Course: 6/10: The patient had improvement in her dysarthria and L sided strength. She did not pass the bedside swallowing assessment.  S/P rTPA and R-M1 thrombectomy with TICI 3 flow. Her sheath is off. R MCA partial stroke inviolving the right  insular cortex and small hemorrhagic transformation.     Interval History:  >4 elements OR status of 3 inpatient conditions  The patient had improvement in her dysarthria and L sided strength. She did not pass the bedside swallowing assessment.  S/P rTPA and R-M1 thrombectomy with TICI 3 flow. Her sheath is off. R MCA partial stroke inviolving the right  insular cortex and small hemorrhagic transformation.   Review of Systems   HENT: Negative.    Eyes: Negative.    Respiratory: Negative.    Cardiovascular: Negative.    Gastrointestinal: Negative.    Endocrine: Negative.    Genitourinary: Negative.    Allergic/Immunologic: Negative.    Neurological: Positive for facial asymmetry, speech difficulty and weakness.   Hematological: Negative.    Psychiatric/Behavioral: Negative.      2 systems OR Unable to obtain a complete ROS due to level of consciousness.  Objective:     Vitals:  Temp: 98.2 °F (36.8 °C)  Pulse: (!) 55  Rhythm: normal sinus rhythm, sinus arrhythmia  BP: (!) 150/65  MAP (mmHg): 94  Resp: (!) 23  SpO2: 98 %  O2 Device (Oxygen Therapy): nasal cannula    Temp  Min: 97.5 °F  (36.4 °C)  Max: 98.2 °F (36.8 °C)  Pulse  Min: 54  Max: 83  BP  Min: 124/60  Max: 165/70  MAP (mmHg)  Min: 85  Max: 117  Resp  Min: 12  Max: 50  SpO2  Min: 95 %  Max: 100 %    06/09 0701 - 06/10 0700  In: 733.3 [I.V.:733.3]  Out: 1250 [Urine:1250]           Physical Exam  Unable to test gait due to level of consciousness.    General   HEENT:   Chest Heart RRR / Lungs Clear to auscultation  Abdomen: Soft nontender + BS  Extremities: OK distal pulses.  Skin: bullous pemphigoid.  Neurological Exam:  MS; Alert, oriented to P/T/P/R, No language abnormalities.    CN: II-XII LUMN VII, dysarthria improved.  Motor: LUE   4/5 / RUE  5/5  Tone normal bilaterally             LLE   4/5 /  RLE  5/5  Tone normal bilaterally  Sensory: LT/PP/T/ Vibration Decreased light touch on her left side.                 Complex sensory modalities: not tested  DTR:  normal throughout.  Coordination /Fine motor:   Gait: Not tested.  Meningeal signs: Absent     Medications:  Continuous  sodium chloride 0.9% Last Rate: 75 mL/hr at 06/10/18 2100   Scheduled  amLODIPine 5 mg Daily   aspirin 325 mg Daily   atorvastatin 40 mg Daily   clobetasol 0.05%  BID   heparin (porcine) 5,000 Units Q8H   sodium chloride 0.9% 3 mL Q8H   PRN   Today I personally reviewed pertinent medications, lines/drains/airways, imaging, cardiology, lab results, microbiology results, notably:    Diet  Diet NPO  Diet NPO    CMP:      Recent Labs  Lab 06/10/18  0250   CALCIUM 7.9*   ALBUMIN 3.0*   PROT 5.8*      K 3.4*   CO2 20*   *   BUN 15   CREATININE 0.8   ALKPHOS 69   ALT 28   AST 29   BILITOT 0.5      BMP:      Recent Labs  Lab 06/10/18  0250      K 3.4*   *   CO2 20*   BUN 15   CREATININE 0.8   CALCIUM 7.9*      CBC:      Recent Labs  Lab 06/10/18  0250   WBC 10.45   RBC 3.73*   HGB 11.0*   HCT 34.0*      MCV 91   MCH 29.5   MCHC 32.4      Lipid Panel:      Recent Labs  Lab 06/09/18  1712   CHOL 159   LDLCALC 109.4   HDL 36*   TRIG 68       Coagulation:      Recent Labs  Lab 06/09/18  1208   INR 1.0      Platelet Aggregation Study: No results for input(s): PLTAGG, PLTAGINTERP, PLTAGREGLACO, ADPPLTAGGREG in the last 168 hours.  Hgb A1C:      Recent Labs  Lab 06/09/18  1712   HGBA1C 6.2*      TSH:      Recent Labs  Lab 06/09/18  1712   TSH 1.507      No results for input(s): PH, PCO2, PO2, HCO3, POCSATURATED, BE in the last 24 hours.           Assessment/Plan:     No new Assessment & Plan notes have been filed under this hospital service since the last note was generated.  Service: Neuro Critical Care      } Active Problem List:   1.Acute R MCA embolic stroke, S/P rTPA and R-M1 thrombectomy with TICI 3 flow.  2. HTN  3. Bullous pemphigoid              4. Hx DVT  5. Hx Cholecystectomy, hysterectomy, tonsillectomy, R-ankle fx repair surgery  6. Dysarthria and dysphagia.        Assessment / Plan:     Neuro:  -MRI today.  -Lipitor 40mg qd.  -Pending to start antiplatelets after MRI  -Pending to D/C sheath.  -PT,OT and speech eval tomorrow.  -NGT placement  -EEG x 1 hr to assess if evidence of R parietal cortical irritability.  Resp:  RA  CV: Keep SBP <=160 mmHg.  -TTE  -ECG 12 lead.  -Lipitor 40mg qd  -Consider holding antiplatelets x 24hrs.   -ASA antiplatelets 325 mg qd   -Start Amlodipine 5mg qd  IVF/nutrition/Renal/GI:  -NGT placement.  -Pending to have speech path reassess the patient.  -NS at 75 cc/hr IVD  -BR  Hem / ID:  -Pending to start antiplatelet agent.  Endo: .  -Lipid profile  -HgA1C  -Statin  Prophylaxis:  -Restart SC Heparin prophylaxis 5000 U q 8hrs    Derm:  -Continue Triamcinolone cream topic.  Advance Directives and Disposition:    Full Code. Consider transferring to stroke team SDU.             Uninterrupted level 3  Follow-up /Counseling Time (not including procedures):  = 35 min            Activity Orders          None        Full Code    Gio Griffin MD  Neurocritical Care  [facility]

## 2018-06-11 NOTE — PLAN OF CARE
Problem: Patient Care Overview  Goal: Plan of Care Review  Outcome: Ongoing (interventions implemented as appropriate)  POC reviewed with pt and family at 1600. Pt and family verbalized understanding. Questions and concerns addressed. No acute events today. Pt progressing toward goals. Will continue to monitor. See flowsheets for full assessment and VS info.

## 2018-06-11 NOTE — SUBJECTIVE & OBJECTIVE
Interval History: NAEON.    Medications:  Continuous Infusions:  Scheduled Meds:   amLODIPine  5 mg Per NG tube Daily    aspirin  325 mg Per NG tube Daily    [START ON 6/12/2018] atorvastatin  40 mg Per NG tube Daily    clobetasol 0.05%   Topical (Top) BID    heparin (porcine)  5,000 Units Subcutaneous Q8H    sodium chloride 0.9%  3 mL Intravenous Q8H     PRN Meds:     Review of Systems    Objective:     Weight: 75.8 kg (167 lb 1.7 oz)  Body mass index is 31.96 kg/m².  Vital Signs (Most Recent):  Temp: 99 °F (37.2 °C) (06/11/18 1101)  Pulse: 62 (06/11/18 1301)  Resp: 16 (06/11/18 1301)  BP: (!) 154/73 (06/11/18 1301)  SpO2: 97 % (06/11/18 1301) Vital Signs (24h Range):  Temp:  [32 °F (0 °C)-99 °F (37.2 °C)] 99 °F (37.2 °C)  Pulse:  [53-83] 62  Resp:  [13-31] 16  SpO2:  [94 %-100 %] 97 %  BP: (140-169)/(63-74) 154/73       Date 06/11/18 0700 - 06/12/18 0659   Shift 2291-0000 7176-6966 4205-0786 24 Hour Total   I  N  T  A  K  E   I.V.  (mL/kg) 375  (4.9)   375  (4.9)    NG/GT 30   30    Shift Total  (mL/kg) 405  (5.3)   405  (5.3)   O  U  T  P  U  T   Urine  (mL/kg/hr) 500   500    Shift Total  (mL/kg) 500  (6.6)   500  (6.6)   Weight (kg) 75.8 75.8 75.8 75.8            NG/OG Tube 06/10/18 1600 Right nostril (Active)   Placement Check placement verified by distal tube length measurement;placement verified by x-ray;placement verified by aspirate characteristics 6/11/2018 11:01 AM   Distal Tube Length (cm) 65 6/11/2018 11:01 AM   Tolerance no signs/symptoms of discomfort 6/11/2018 11:01 AM   Securement taped to nostril center 6/11/2018 11:01 AM   Clamp Status/Tolerance clamped;no abdominal discomfort;no abdominal distention;no emesis;no nausea;no residual;no restlessness 6/11/2018 11:01 AM   Suction Setting/Drainage Method dependent drainage 6/10/2018  4:00 PM   Insertion Site Appearance no redness, warmth, tenderness, skin breakdown, drainage 6/11/2018 11:01 AM   Drainage None 6/11/2018 11:01 AM   Current Rate  (mL/hr) 0 mL/hr 6/11/2018 11:01 AM   Intake (mL) 60 mL 6/10/2018  4:30 PM   Intake (mL) - Formula Tube Feeding 20 6/11/2018  1:01 PM   Residual Amount (ml) 0 ml 6/11/2018  7:01 AM            Sheath 06/09/18 1522 Right (Active)   Insertion Site no hematoma;occlusive dressing intact;clean and dry 6/11/2018 11:01 AM   Drainage Characteristics/Odor No odor 6/9/2018  4:00 PM   Drainage Amount None 6/11/2018  7:01 AM       Female External Urinary Catheter 06/09/18 1600 (Active)   Skin no redness;no breakdown 6/11/2018 11:01 AM   Tolerance no signs/symptoms of discomfort 6/11/2018 11:01 AM   Suction Continuous suction at 40 mmHg 6/11/2018 11:01 AM   Date of last wick change 06/11/18 6/11/2018 11:01 AM   Time of last wick change 0600 6/11/2018 11:01 AM   Output (mL) 500 mL 6/11/2018  7:01 AM       Neurosurgery Physical Exam     AAOx4  Dysarthric  LUE/LLE 4/5  RUE/RLE 5/5    Significant Labs:    Recent Labs  Lab 06/10/18  0250 06/11/18  0210    110    140   K 3.4* 3.7   * 112*   CO2 20* 19*   BUN 15 10   CREATININE 0.8 0.7   CALCIUM 7.9* 8.4*   MG 1.5* 2.0       Recent Labs  Lab 06/10/18  0250 06/11/18  0210   WBC 10.45 11.32   HGB 11.0* 10.9*   HCT 34.0* 34.6*    209     No results for input(s): LABPT, INR, APTT in the last 48 hours.  Microbiology Results (last 7 days)     ** No results found for the last 168 hours. **        All pertinent labs from the last 24 hours have been reviewed.    Significant Diagnostics:  I have reviewed all pertinent imaging results/findings within the past 24 hours.

## 2018-06-12 PROBLEM — R13.10 DYSPHAGIA: Status: ACTIVE | Noted: 2018-06-12

## 2018-06-12 LAB
ALBUMIN SERPL BCP-MCNC: 2.6 G/DL
ALP SERPL-CCNC: 69 U/L
ALT SERPL W/O P-5'-P-CCNC: 23 U/L
ANION GAP SERPL CALC-SCNC: 9 MMOL/L
AST SERPL-CCNC: 23 U/L
BASOPHILS # BLD AUTO: 0.03 K/UL
BASOPHILS NFR BLD: 0.3 %
BILIRUB SERPL-MCNC: 0.6 MG/DL
BUN SERPL-MCNC: 13 MG/DL
CALCIUM SERPL-MCNC: 8.3 MG/DL
CHLORIDE SERPL-SCNC: 110 MMOL/L
CO2 SERPL-SCNC: 21 MMOL/L
CREAT SERPL-MCNC: 0.7 MG/DL
DIFFERENTIAL METHOD: ABNORMAL
EOSINOPHIL # BLD AUTO: 0.1 K/UL
EOSINOPHIL NFR BLD: 1 %
ERYTHROCYTE [DISTWIDTH] IN BLOOD BY AUTOMATED COUNT: 14 %
EST. GFR  (AFRICAN AMERICAN): >60 ML/MIN/1.73 M^2
EST. GFR  (NON AFRICAN AMERICAN): >60 ML/MIN/1.73 M^2
GLUCOSE SERPL-MCNC: 146 MG/DL
HCT VFR BLD AUTO: 30.9 %
HGB BLD-MCNC: 10.1 G/DL
IMM GRANULOCYTES # BLD AUTO: 0.11 K/UL
IMM GRANULOCYTES NFR BLD AUTO: 1.1 %
LYMPHOCYTES # BLD AUTO: 1.3 K/UL
LYMPHOCYTES NFR BLD: 13.6 %
MAGNESIUM SERPL-MCNC: 1.4 MG/DL
MCH RBC QN AUTO: 29.7 PG
MCHC RBC AUTO-ENTMCNC: 32.7 G/DL
MCV RBC AUTO: 91 FL
MONOCYTES # BLD AUTO: 1.1 K/UL
MONOCYTES NFR BLD: 11 %
NEUTROPHILS # BLD AUTO: 7.2 K/UL
NEUTROPHILS NFR BLD: 73 %
NRBC BLD-RTO: 0 /100 WBC
PHOSPHATE SERPL-MCNC: 2.7 MG/DL
PLATELET # BLD AUTO: 165 K/UL
PMV BLD AUTO: 10.3 FL
POCT GLUCOSE: 125 MG/DL (ref 70–110)
POCT GLUCOSE: 130 MG/DL (ref 70–110)
POCT GLUCOSE: 141 MG/DL (ref 70–110)
POTASSIUM SERPL-SCNC: 3.4 MMOL/L
PROT SERPL-MCNC: 5.5 G/DL
RBC # BLD AUTO: 3.4 M/UL
SODIUM SERPL-SCNC: 140 MMOL/L
WBC # BLD AUTO: 9.8 K/UL

## 2018-06-12 PROCEDURE — 63600175 PHARM REV CODE 636 W HCPCS: Performed by: PHYSICIAN ASSISTANT

## 2018-06-12 PROCEDURE — 92526 ORAL FUNCTION THERAPY: CPT

## 2018-06-12 PROCEDURE — 99233 SBSQ HOSP IP/OBS HIGH 50: CPT | Mod: GC,,, | Performed by: PSYCHIATRY & NEUROLOGY

## 2018-06-12 PROCEDURE — 25000003 PHARM REV CODE 250: Performed by: PHYSICIAN ASSISTANT

## 2018-06-12 PROCEDURE — 97530 THERAPEUTIC ACTIVITIES: CPT

## 2018-06-12 PROCEDURE — 84100 ASSAY OF PHOSPHORUS: CPT

## 2018-06-12 PROCEDURE — 97116 GAIT TRAINING THERAPY: CPT

## 2018-06-12 PROCEDURE — 20600001 HC STEP DOWN PRIVATE ROOM

## 2018-06-12 PROCEDURE — 95819 EEG AWAKE AND ASLEEP: CPT | Mod: 26,,, | Performed by: PSYCHIATRY & NEUROLOGY

## 2018-06-12 PROCEDURE — 25000003 PHARM REV CODE 250: Performed by: PSYCHIATRY & NEUROLOGY

## 2018-06-12 PROCEDURE — 99232 SBSQ HOSP IP/OBS MODERATE 35: CPT | Mod: ,,, | Performed by: PHYSICIAN ASSISTANT

## 2018-06-12 PROCEDURE — 85025 COMPLETE CBC W/AUTO DIFF WBC: CPT

## 2018-06-12 PROCEDURE — 99222 1ST HOSP IP/OBS MODERATE 55: CPT | Mod: ,,, | Performed by: NURSE PRACTITIONER

## 2018-06-12 PROCEDURE — 80053 COMPREHEN METABOLIC PANEL: CPT

## 2018-06-12 PROCEDURE — 25000003 PHARM REV CODE 250: Performed by: HOSPITALIST

## 2018-06-12 PROCEDURE — 97535 SELF CARE MNGMENT TRAINING: CPT

## 2018-06-12 PROCEDURE — 25000003 PHARM REV CODE 250: Performed by: STUDENT IN AN ORGANIZED HEALTH CARE EDUCATION/TRAINING PROGRAM

## 2018-06-12 PROCEDURE — 95813 EEG EXTND MNTR 61-119 MIN: CPT

## 2018-06-12 PROCEDURE — 83735 ASSAY OF MAGNESIUM: CPT

## 2018-06-12 PROCEDURE — A4216 STERILE WATER/SALINE, 10 ML: HCPCS | Performed by: HOSPITALIST

## 2018-06-12 RX ORDER — POTASSIUM CHLORIDE 20 MEQ/15ML
40 SOLUTION ORAL ONCE
Status: COMPLETED | OUTPATIENT
Start: 2018-06-12 | End: 2018-06-12

## 2018-06-12 RX ORDER — LANOLIN ALCOHOL/MO/W.PET/CERES
800 CREAM (GRAM) TOPICAL 2 TIMES DAILY
Status: COMPLETED | OUTPATIENT
Start: 2018-06-12 | End: 2018-06-12

## 2018-06-12 RX ORDER — AMOXICILLIN 250 MG
1 CAPSULE ORAL DAILY
Status: DISCONTINUED | OUTPATIENT
Start: 2018-06-12 | End: 2018-06-15 | Stop reason: HOSPADM

## 2018-06-12 RX ORDER — GLUCAGON 1 MG
1 KIT INJECTION
Status: DISCONTINUED | OUTPATIENT
Start: 2018-06-12 | End: 2018-06-15 | Stop reason: HOSPADM

## 2018-06-12 RX ORDER — INSULIN ASPART 100 [IU]/ML
1-10 INJECTION, SOLUTION INTRAVENOUS; SUBCUTANEOUS EVERY 6 HOURS PRN
Status: DISCONTINUED | OUTPATIENT
Start: 2018-06-12 | End: 2018-06-14

## 2018-06-12 RX ADMIN — AMLODIPINE BESYLATE 5 MG: 5 TABLET ORAL at 08:06

## 2018-06-12 RX ADMIN — CLOBETASOL PROPIONATE: 0.5 OINTMENT TOPICAL at 09:06

## 2018-06-12 RX ADMIN — HEPARIN SODIUM 5000 UNITS: 5000 INJECTION, SOLUTION INTRAVENOUS; SUBCUTANEOUS at 01:06

## 2018-06-12 RX ADMIN — Medication 3 ML: at 06:06

## 2018-06-12 RX ADMIN — HEPARIN SODIUM 5000 UNITS: 5000 INJECTION, SOLUTION INTRAVENOUS; SUBCUTANEOUS at 09:06

## 2018-06-12 RX ADMIN — CLOBETASOL PROPIONATE: 0.5 OINTMENT TOPICAL at 08:06

## 2018-06-12 RX ADMIN — Medication 800 MG: at 09:06

## 2018-06-12 RX ADMIN — ASPIRIN 325 MG ORAL TABLET 325 MG: 325 PILL ORAL at 08:06

## 2018-06-12 RX ADMIN — ATORVASTATIN CALCIUM 40 MG: 20 TABLET, FILM COATED ORAL at 08:06

## 2018-06-12 RX ADMIN — STANDARDIZED SENNA CONCENTRATE AND DOCUSATE SODIUM 1 TABLET: 8.6; 5 TABLET, FILM COATED ORAL at 11:06

## 2018-06-12 RX ADMIN — Medication 800 MG: at 11:06

## 2018-06-12 RX ADMIN — Medication 3 ML: at 01:06

## 2018-06-12 RX ADMIN — POTASSIUM CHLORIDE 40 MEQ: 20 SOLUTION ORAL at 11:06

## 2018-06-12 RX ADMIN — HEPARIN SODIUM 5000 UNITS: 5000 INJECTION, SOLUTION INTRAVENOUS; SUBCUTANEOUS at 06:06

## 2018-06-12 NOTE — CONSULTS
[facility]  Physical Medicine & Rehab  Consult Note    Patient Name: Isa Sewell  MRN: [id number]  Admission Date: 6/9/2018  Hospital Length of Stay: 3 days  Attending Physician: Hardeep Schmid MD;August*     Inpatient consult to Physical Medicine & Rehabilitation  Consult performed by: Griselda Lozano NP  Consult requested by:  Hardeep Schmid MD;August*    Collaborating Physician: Dora Herbert MD  Reason for Consult:  assess rehabilitation needs    Consults  Subjective:     Principal Problem: Embolic stroke involving right middle cerebral artery    HPI: Isa Sewell is an 87-year-old female with PMHx of HTN, DVT, and bullous pemphigoid.  Patient presented to [facility] with acute onset left-sided weakness, right gaze preference, and facial droop.  On arrival, tele-stroke consult completed.  CTH without acute pathology, and IV tPA administered.  Transferred to [facility] on 6/9/18 for further evaluation and management.  Upon admission, CTA multiphase showed acute R MCA infarct with distal R M1 occlusion, and she underwent cerebral angiogram with thrombectomy.  MRI brain re-demonstrated large R MCA infarction with small area of hemorrhagic conversion.      Functional History: Patient lives in [place] with her  in a single story home with threshold to enter.  Prior to admission, she was (I) with ADLs, including driving, and mobility.  DME: shower chair.    Hospital Course:   6/10/18:  Evaluated by PT, OT, and SLP.  Found to have dysphagia and dysarthria.  SLP recommendation: NPO.  Bed mobility min-maxA.  EOB Kary.  Sit to stand min-maxA and transfers Kary.  Ambulated 5 ft x 2 Kary.  UBD modA, grooming SBA, and toileting totalA.   6/11/18:  Participated with SLP.  SLP recommendation: puree diet and honey thick liquids for pleasure feeding only.     Past Medical History:   Diagnosis Date    Bullous pemphigoid     pemphigoid nodularis variant    DVT (deep venous thrombosis)      Hypertension      Past Surgical History:   Procedure Laterality Date    BREAST BIOPSY Bilateral     BOTH BENIGN    CHOLECYSTECTOMY      FRACTURE SURGERY Right     ankle    HYSTERECTOMY      TONSILLECTOMY       Review of patient's allergies indicates:   Allergen Reactions    Sulfa (sulfonamide antibiotics) Hives       Scheduled Medications:    amLODIPine  5 mg Per NG tube Daily    aspirin  325 mg Per NG tube Daily    atorvastatin  40 mg Per NG tube Daily    clobetasol 0.05%   Topical (Top) BID    heparin (porcine)  5,000 Units Subcutaneous Q8H    magnesium oxide  800 mg Oral BID    senna-docusate 8.6-50 mg  1 tablet Oral Daily    sodium chloride 0.9%  3 mL Intravenous Q8H       PRN Medications: dextrose 50%, glucagon (human recombinant), insulin aspart U-100    Family History     None        Social History Main Topics    Smoking status: Never Smoker    Smokeless tobacco: Not on file    Alcohol use No    Drug use: Unknown    Sexual activity: Not on file     Review of Systems   Constitutional: Positive for fatigue. Negative for chills and fever.   HENT: Positive for trouble swallowing. Negative for drooling, hearing loss and voice change.    Eyes: Negative for pain and visual disturbance.   Respiratory: Negative for cough, shortness of breath and wheezing.    Cardiovascular: Negative for chest pain and palpitations.   Gastrointestinal: Negative for abdominal distention, nausea and vomiting.   Genitourinary: Negative for difficulty urinating and flank pain.   Musculoskeletal: Positive for gait problem. Negative for arthralgias, back pain, myalgias and neck pain.   Skin: Negative for rash and wound.   Neurological: Positive for speech difficulty and weakness. Negative for dizziness, numbness and headaches.   Psychiatric/Behavioral: Negative for agitation and hallucinations. The patient is not nervous/anxious.      Objective:     Vital Signs (Most Recent):  Temp: 98.4 °F (36.9 °C) (06/12/18  1105)  Pulse: 68 (18 1505)  Resp: 20 (18 1505)  BP: (!) 155/96 (18 1505)  SpO2: 95 % (18 1505)    Vital Signs (24h Range):  Temp:  [97.5 °F (36.4 °C)-98.4 °F (36.9 °C)] 98.4 °F (36.9 °C)  Pulse:  [55-71] 68  Resp:  [16-44] 20  SpO2:  [93 %-100 %] 95 %  BP: (132-179)/(62-97) 155/96     Body mass index is 31.96 kg/m².    Physical Exam   Constitutional: She is oriented to person, place, and time. She appears well-developed and well-nourished. No distress.   HENT:   Head: Normocephalic and atraumatic.   Right Ear: External ear normal.   Left Ear: External ear normal.   Nose: Nose normal.   EEG in place  NGT in place   Eyes: Right eye exhibits no discharge. Left eye exhibits no discharge. No scleral icterus.   Neck: Normal range of motion.   Cardiovascular: Normal rate, regular rhythm and intact distal pulses.    Pulmonary/Chest: Effort normal. No respiratory distress. She has no wheezes.   Abdominal: Soft. She exhibits no distension. There is no tenderness.   Musculoskeletal: Normal range of motion. She exhibits no edema or tenderness.   Neurological: She is alert and oriented to person, place, and time. She exhibits normal muscle tone.   -  Mental Status:  AAOx3.  Follows commands.  Answers correct age and .  Recent and remote memory intact.  -  Speech and language:  no aphasia, mild dysarthria.    -  Facial movement (CN VII): symmetrical.   -  Motor:  RUE: 5/5.  LUE: 4/5.  RLE: 5/5.  LLE: 4/5.  -  Tone:  Normal.  -  Sensory:  Intact to light touch and pin prick.   Skin: Skin is warm and dry. No rash noted.   Psychiatric: She has a normal mood and affect. Her behavior is normal. Thought content normal.   Vitals reviewed.    Diagnostic Results:   Labs: Reviewed  X-Ray: Reviewed  CT: Reviewed  MRI: Reviewed  CTA: Reviewed    Assessment/Plan:     * Embolic stroke involving right middle cerebral artery    -  Presented with acute onset left-sided weakness, right gaze preference, and facial  droop  -  CTH without acute pathology s/p tPA  -  CTA multiphase showed acute R MCA infarct with distal R M1 occlusion s/p thrombectomy  -  MRI brain re-demonstrated large R MCA infarction with small area of hemorrhagic conversion  See hospital course for functional, cognitive/speech/language, and nutrition/swallow status.      Recommendations  -  Encourage mobility, OOB in chair, and early ambulation as appropriate   -  PT/OT evaluate and treat  -  SLP speech and cognitive evaluate and treat  -  Monitor mood and sleep disturbances  -  Establish consistent sleep-wake cycle  -  Monitor for bowel and bladder dysfunction  -  Monitor for shoulder pain and subluxation  -  Monitor for spasticity  -  Monitor for and prevent skin breakdown and pressure ulcers  · Early mobility, repositioning/weight shifting every 20-30 minutes when sitting, turn patient every 2 hours, proper mattress/overlay and chair cushioning, pressure relief/heel protector boots  -  DVT prophylaxis:  BESS, SCD        Patient with goals for IRF.  Not medically ready for discharge.  Continue PT, OT, and SLP.  Will follow progress for final post-acute/rehab recommendation.    Thank you for your consult.     LORENZA Hazel  Department of Physical Medicine & Rehab  [facility]

## 2018-06-12 NOTE — SUBJECTIVE & OBJECTIVE
Neurologic Chief Complaint: Right MCA stroke.     Subjective:     Interval History: Patient is seen for follow-up neurological assessment and treatment recommendations:   Patient has improved significantly despite developing a small area of hemorrhagic conversion. She has regained most of her strength back. She is still having facial weakness and difficulty swallowing.       HPI, Past Medical, Family, and Social History remains the same as documented in the initial encounter.     Review of Systems   Constitutional: Negative for fever.   HENT: Positive for trouble swallowing.    Gastrointestinal: Negative for nausea and vomiting.   Neurological: Positive for facial asymmetry. Negative for weakness.     Scheduled Meds:   amLODIPine  5 mg Per NG tube Daily    aspirin  325 mg Per NG tube Daily    atorvastatin  40 mg Per NG tube Daily    clobetasol 0.05%   Topical (Top) BID    heparin (porcine)  5,000 Units Subcutaneous Q8H    magnesium oxide  800 mg Oral BID    senna-docusate 8.6-50 mg  1 tablet Oral Daily    sodium chloride 0.9%  3 mL Intravenous Q8H     Continuous Infusions:    PRN Meds:    Objective:     Vital Signs (Most Recent):  Temp: 98.4 °F (36.9 °C) (06/12/18 1105)  Pulse: 63 (06/12/18 1105)  Resp: (!) 23 (06/12/18 1105)  BP: (!) 142/65 (06/12/18 1105)  SpO2: 96 % (06/12/18 1105)  BP Location: Left arm    Vital Signs Range (Last 24H):  Temp:  [97.5 °F (36.4 °C)-98.9 °F (37.2 °C)]   Pulse:  [55-71]   Resp:  [16-44]   BP: (136-179)/(62-97)   SpO2:  [93 %-100 %]   BP Location: Left arm    Physical Exam   Constitutional: She appears well-developed and well-nourished. No distress.   HENT:   Head: Normocephalic and atraumatic.   Neck: Neck supple.   Cardiovascular: Normal rate.    No murmur heard.  Pulmonary/Chest: Effort normal.   Musculoskeletal: She exhibits no edema.   Neurological: She is alert. A cranial nerve deficit and sensory deficit is present. She exhibits abnormal muscle tone.   Skin: Skin is  warm and dry.   Vitals reviewed.    Neurological Exam:   LOC: Alert  Attention Span: good  Language: No aphasia  Articulation: moderate Dysarthria  Visual Fields: intact   Facial Movement (CN VII):  lower facial weakness on the Left  Motor: Arm left  Plegia +4/5  Leg left  Plegia +4/5  Arm right  Normal 5/5  Leg right Normal 5/5  Sensation: intact     Laboratory:  CBC:     Recent Labs  Lab 06/12/18  0223   WBC 9.80   RBC 3.40*   HGB 10.1*   HCT 30.9*      MCV 91   MCH 29.7   MCHC 32.7       Diagnostic Results     Brain Imaging/Vessel Imaging   MRI 6/10   Large right MCA territory acute infarct with evidence of hemorrhagic reconversion as above.  Subtle subarachnoid hemorrhage overlying right frontal sulci.    CTH/CTA 6/9   CT head: Acute right MCA distribution infarct with associated mild localized mass effect.  No significant midline shift or acute intracranial hemorrhage.  Superimposed generalized cerebral volume loss and chronic microvascular ischemic change.  CTA head: Abrupt termination of the distal M1 segment of the right MCA concerning for large vessel occlusion with good filling of distal branches via collateral vessels.  Mild bilateral carotid atherosclerotic plaque with less than 50% hemodynamically significant stenosis by NASCET criteria.    Cardiac Imaging   CONCLUSIONS     1 - Moderate left atrial enlargement.     2 - Normal left ventricular systolic function (EF 60-65%).     3 - No wall motion abnormalities.     4 - Normal left ventricular diastolic function.     5 - Normal right ventricular systolic function .     6 - Mild to moderate aortic stenosis, ESTELITA = 1.17 cm2, AVAi = 0.68 cm2/m2, peak velocity = 2.75 m/s, mean gradient = 17 mmHg.     7 - Mild aortic regurgitation.     8 - The estimated PA systolic pressure is 25 mmHg.     9 - Mild mitral regurgitation.

## 2018-06-12 NOTE — PT/OT/SLP PROGRESS
"Physical Therapy Treatment    Patient Name:  Isa Sewell   MRN:  [id number]  Admitting Diagnosis: Embolic stroke involving right middle cerebral artery  Recent Surgery: * No surgery found *      Recommendations:     Discharge Recommendations:  rehabilitation facility   Discharge Equipment Recommendations:  (TBD)   Barriers to discharge: Decreased caregiver support    Plan:     During this hospitalization, patient to be seen 4 x/week to address the above listed problems via gait training, therapeutic activities, therapeutic exercises, neuromuscular re-education  · Plan of Care Expires:  07/10/18   Plan of Care Reviewed with: patient, spouse, daughter    This Plan of care has been discussed with the patient who was involved in its development and understands and is in agreement with the identified goals and treatment plan    Subjective     Communicated with RN (Shamir) prior to session.     Patient comments: "My hand hurts"  Pain/Comfort:  · Pain Rating 1:  (No rating provided)  · Location - Side 1: Left  · Location - Orientation 1: generalized  · Location 1: hand  · Pain Addressed 1: Reposition, Distraction, Nurse notified  · Pain Rating Post-Intervention 1:  (No rating provided)    Objective:     Patient found with: blood pressure cuff, NG tube, oxygen, telemetry, pulse ox (continuous), peripheral IV, PureWick (EEG)    Patient found in R side lying upon PT entry to room, agreeable to treatment.  Family present in the room.    General Precautions: Standard, Cardiac aspiration, fall, NPO   Orthopedic Precautions:N/A   Braces: N/A       BED MOBILITY (with vc's for sequencing and safe technique of functional mobility):        Rolling to the L with min A with no use of bedrail       Sup > sit at the EOB with min A from L side lying        Sit > sup with min A       Scooting hips to the EOB upon sitting with min A x2-3 scoots       Scooting hips along the EOB to the L requiring min A x2-3 scoots       Pt performs " scooting laterally to the R via bridging with min/mod A x2 scoot(s)     SITTING AT THE EDGE OF THE BED    Assistance Level Required: min A for trunk control with B UE support    Postural deviations noted: flexed trunk, post lean, PPT   Encouraged: upright posture, neutral pelvis        TRANSFERS   Patient completed Sit <> Stand Transfer from EOB with min A for balance L UE with no assistive device    Patient completed Stand <> Sit Transfer to EOB with min A for balance and L UE with no assistive device       STANDING        Pt tolerates standing with B HHA requiring min A for balance with vc's.  Pt demonstrates post lean    GAIT:   Patient ambulated: 6-7ft to the L and to the R x3 trials    Patient required: min A for balance and L UE   Patient used:  B HHA   Gait Pattern observed: step to   Gait Deviation(s): decreased jose, increased time in double stance, decreased velocity of limb motion, decreased step length, decreased swing-to-stance ratio, decreased toe-to-floor clearance and decreased weight-shifting ability    Comments: vc's for jose, B step length, weight shift, postural control and safety    EDUCATION  Education provided to pt regarding: postural control, weight shift, importance and benefits of performing exercises and functional mobility.    They were provided and educated on proper positioning in supine and in sitting with support of affected L UE in order to increase awareness of it and to decrease the effects of immobility, specifically edema and pain.     Whiteboard updated with correct mobility information. RN/PCT notified.  Pt safe to transfer OOB and amb short distances with RN/PCT: Use no AD with min A of 1 person    Patient left supine, with L UE propped on pillow for scapular support and edema management, head in midline, neutral pelvis & heels floated for skin protection with all lines intact, call button in reach, RN notified and family present    AM-PAC 6 CLICK MOBILITY  Turning over  in bed (including adjusting bedclothes, sheets and blankets)?: 3  Sitting down on and standing up from a chair with arms (e.g., wheelchair, bedside commode, etc.): 3  Moving from lying on back to sitting on the side of the bed?: 3  Moving to and from a bed to a chair (including a wheelchair)?: 3  Need to walk in hospital room?: 3  Climbing 3-5 steps with a railing?: 3  Total Score: 18     Assessment:     Isa Sewell is a 87 y.o. female admitted with a medical diagnosis of Embolic stroke involving right middle cerebral artery.  She presents with the following impairments/functional limitations:  weakness, impaired endurance, impaired sensation, impaired self care skills, impaired functional mobilty, gait instability, impaired balance, decreased coordination, decreased upper extremity function, decreased safety awareness, pain, abnormal tone, decreased ROM, impaired coordination, impaired fine motor, impaired skin, edema. L hemiparesis requiring assistance and verbal cues for bed mob, transfers and gait 2* weakness, edema, pain, decreased balance.   In light of pt's current functional level and deficits, it is anticipated that pt will need to participate in an intense rehab program consisting of PT, OT and ST in order to achieve full rehab potential to return to previous level of function and roles.  Pt remains motivated to participate in PT session and will cont to benefit from skilled PT intervention.    Rehab Prognosis:  Good; patient would benefit from acute skilled PT services to address these deficits and reach maximum level of function.      GOALS:    Physical Therapy Goals        Problem: Physical Therapy Goal    Goal Priority Disciplines Outcome Goal Variances Interventions   Physical Therapy Goal     PT/OT, PT Ongoing (interventions implemented as appropriate)     Description:    Goals to be met by 6/22/2018    1. Pt will perform rolling to the R and L with SBA.   2. Pt will perform supine to sit from  both sides of the bed with SBA.  3. Pt will perform sit to supine with SBA.  4. Pt will perform sit to stand transfers with SBA with no AD.    5. Pt will perform bed <> chair transfers with SBA with no AD.  6. Pt will perform gait x 150 feet with SBA and no AD.                      Time Tracking:     PT Received On: 06/12/18  PT Start Time: 1448     PT Stop Time: 1517  PT Total Time (min): 29 min     Billable Minutes: Gait Training 14 and Therapeutic Activity 15    Treatment Type: Treatment  PT/PTA: PTA     PTA Visit Number: 1       Darby Fontana PTA.  Pager [phone]    6/12/2018    .

## 2018-06-12 NOTE — PROGRESS NOTES
[facility]  Neurocritical Care  Progress Note    Admit Date: 6/9/2018  Service Date: 06/12/2018  Length of Stay: 3    Subjective:     Chief Complaint: Embolic stroke involving right middle cerebral artery    History of Present Illness: Ms. Sewell is a 85 y/o female with PMH significant for HTN and bullous pemphigoid who presents to [facility] s/p tPA and R MCA thrombectomy. Patient developed acute onset LSW at 1130 am. Patient received TPA at 1245 pm and was then transferred to [facility] for thrombectomy. Patient had successful thrombectomy of R MCA with TICI 3 after two passes.     Hospital Course: 6/10: The patient had improvement in her dysarthria and L sided strength. She did not pass the bedside swallowing assessment.  S/P rTPA and R-M1 thrombectomy with TICI 3 flow. Her sheath is off. R MCA partial stroke inviolving the right  insular cortex and small hemorrhagic transformation.   6/11: Boarding for bed on neuro stepdown   6/12: Boarding for bed on neuro stepdown. MBSS ordered     Interval History: Stable overnight. NPO again per SLP evaluation. MBSS ordered for tomorrow AM. Awaiting bed on stepdown unit.     Review of Systems: Denies HA, SOB, CP, abdominal pain, nausea, vomiting, blurred vision, HA, fever.     Vitals:   Temp: 99.3 °F (37.4 °C)  Pulse: 68  Rhythm: normal sinus rhythm  BP: (!) 155/96  MAP (mmHg): 112  Resp: 20  SpO2: 95 %  O2 Device (Oxygen Therapy): room air    Temp  Min: 97.5 °F (36.4 °C)  Max: 99.3 °F (37.4 °C)  Pulse  Min: 55  Max: 71  BP  Min: 132/62  Max: 179/74  MAP (mmHg)  Min: 53  Max: 116  Resp  Min: 16  Max: 44  SpO2  Min: 93 %  Max: 100 %    06/11 0701 - 06/12 0700  In: 1095 [I.V.:375]  Out: 950 [Urine:950]   Unmeasured Output  Urine Occurrence: 1  Stool Occurrence: 0     Examination:   Constitutional: Well-nourished and -developed. No apparent distress.   Eyes: Conjunctiva clear, anicteric. Lids no lesions.  Head/Ears/Nose/Mouth/Throat/Neck: Moist mucous membranes. External  ears, nose atraumatic.   Cardiovascular: Regular rhythm. No murmurs. No leg edema.  Respiratory: Comfortable respirations. Clear to auscultation.  Gastrointestinal: No hernia. Soft, nondistended, nontender. + bowel sounds.    Neurologic:  -GCS [id number]  -Alert. Oriented to person, place, and time. Speech slightly slurred. Follows commands.  -L facial droop   -Motor L 4+/5, 5/5 on right   -Sensation intact     Medications:   Continuous Scheduled  amLODIPine 5 mg Daily   aspirin 325 mg Daily   atorvastatin 40 mg Daily   clobetasol 0.05%  BID   heparin (porcine) 5,000 Units Q8H   magnesium oxide 800 mg BID   senna-docusate 8.6-50 mg 1 tablet Daily   sodium chloride 0.9% 3 mL Q8H   PRN  dextrose 50% 12.5 g PRN   glucagon (human recombinant) 1 mg PRN   insulin aspart U-100 1-10 Units Q6H PRN      Today I independently reviewed pertinent medications, imaging, lab results,     Assessment/Plan:     Neuro   * Embolic stroke involving right middle cerebral artery    87 y/o female with PMH of HTN s/p TPA and thrombectomy for RMCA stroke   - Vascular Neurology following, boarding for bed on stepdown   - Statin   - ASA  - SBP goal 100-180  - PT/OT/SLP        Cardiac/Vascular   Benign essential HTN    - SBP goal 100-160  - Amlodipine 5 mg daily   - Echo completed, EF60, normal diastolic and systolic function         GI   Dysphagia    Failed SLP evaluation   -MBSS ordered  -NGT in place, TF through NGT             Prophylaxis:  Venous Thromboembolism: mechanical chemical  Stress Ulcer: NA  Ventilator Pneumonia: not applicable     Activity Orders          None        Full Code    Mari Butler PA-C  Neurocritical Care  [facility]

## 2018-06-12 NOTE — PLAN OF CARE
Problem: Physical Therapy Goal  Goal: Physical Therapy Goal    Goals to be met by 6/22/2018    1. Pt will perform rolling to the R and L with SBA.   2. Pt will perform supine to sit from both sides of the bed with SBA.  3. Pt will perform sit to supine with SBA.  4. Pt will perform sit to stand transfers with SBA with no AD.    5. Pt will perform bed <> chair transfers with SBA with no AD.  6. Pt will perform gait x 150 feet with SBA and no AD.       Discharge Recommendations: Rehab    Pt safe to transfer OOB and amb short distances with RN/PCT: Use no AD with min A of 1 person.    Goals remain appropriate.     Darby Fontana, PTA.   [phone]   6/12/2018

## 2018-06-12 NOTE — ASSESSMENT & PLAN NOTE
85 y/o female with PMH of HTN s/p TPA and thrombectomy for RMCA stroke   - Vascular Neurology following, boarding for bed on stepdown   - Statin   - ASA  - SBP goal 100-180  - PT/OT/SLP

## 2018-06-12 NOTE — ASSESSMENT & PLAN NOTE
- SBP goal 100-160  - Amlodipine 5 mg daily   - Echo completed, EF60, normal diastolic and systolic function

## 2018-06-12 NOTE — ASSESSMENT & PLAN NOTE
-  Presented with acute onset left-sided weakness, right gaze preference, and facial droop  -  CTH without acute pathology s/p tPA  -  CTA multiphase showed acute R MCA infarct with distal R M1 occlusion s/p thrombectomy  -  MRI brain re-demonstrated large R MCA infarction with small area of hemorrhagic conversion  See hospital course for functional, cognitive/speech/language, and nutrition/swallow status.      Recommendations  -  Encourage mobility, OOB in chair, and early ambulation as appropriate   -  PT/OT evaluate and treat  -  SLP speech and cognitive evaluate and treat  -  Monitor mood and sleep disturbances  -  Establish consistent sleep-wake cycle  -  Monitor for bowel and bladder dysfunction  -  Monitor for shoulder pain and subluxation  -  Monitor for spasticity  -  Monitor for and prevent skin breakdown and pressure ulcers  · Early mobility, repositioning/weight shifting every 20-30 minutes when sitting, turn patient every 2 hours, proper mattress/overlay and chair cushioning, pressure relief/heel protector boots  -  DVT prophylaxis:  BESS, SCD

## 2018-06-12 NOTE — SUBJECTIVE & OBJECTIVE
Past Medical History:   Diagnosis Date    Bullous pemphigoid     pemphigoid nodularis variant    DVT (deep venous thrombosis)     Hypertension      Past Surgical History:   Procedure Laterality Date    BREAST BIOPSY Bilateral     BOTH BENIGN    CHOLECYSTECTOMY      FRACTURE SURGERY Right     ankle    HYSTERECTOMY      TONSILLECTOMY       Review of patient's allergies indicates:   Allergen Reactions    Sulfa (sulfonamide antibiotics) Hives       Scheduled Medications:    amLODIPine  5 mg Per NG tube Daily    aspirin  325 mg Per NG tube Daily    atorvastatin  40 mg Per NG tube Daily    clobetasol 0.05%   Topical (Top) BID    heparin (porcine)  5,000 Units Subcutaneous Q8H    magnesium oxide  800 mg Oral BID    senna-docusate 8.6-50 mg  1 tablet Oral Daily    sodium chloride 0.9%  3 mL Intravenous Q8H       PRN Medications: dextrose 50%, glucagon (human recombinant), insulin aspart U-100    Family History     None        Social History Main Topics    Smoking status: Never Smoker    Smokeless tobacco: Not on file    Alcohol use No    Drug use: Unknown    Sexual activity: Not on file     Review of Systems   Constitutional: Positive for fatigue. Negative for chills and fever.   HENT: Positive for trouble swallowing. Negative for drooling, hearing loss and voice change.    Eyes: Negative for pain and visual disturbance.   Respiratory: Negative for cough, shortness of breath and wheezing.    Cardiovascular: Negative for chest pain and palpitations.   Gastrointestinal: Negative for abdominal distention, nausea and vomiting.   Genitourinary: Negative for difficulty urinating and flank pain.   Musculoskeletal: Positive for gait problem. Negative for arthralgias, back pain, myalgias and neck pain.   Skin: Negative for rash and wound.   Neurological: Positive for speech difficulty and weakness. Negative for dizziness, numbness and headaches.   Psychiatric/Behavioral: Negative for agitation and  hallucinations. The patient is not nervous/anxious.      Objective:     Vital Signs (Most Recent):  Temp: 98.4 °F (36.9 °C) (18 1105)  Pulse: 68 (18 1505)  Resp: 20 (18 1505)  BP: (!) 155/96 (18 1505)  SpO2: 95 % (18 1505)    Vital Signs (24h Range):  Temp:  [97.5 °F (36.4 °C)-98.4 °F (36.9 °C)] 98.4 °F (36.9 °C)  Pulse:  [55-71] 68  Resp:  [16-44] 20  SpO2:  [93 %-100 %] 95 %  BP: (132-179)/(62-97) 155/96     Body mass index is 31.96 kg/m².    Physical Exam   Constitutional: She is oriented to person, place, and time. She appears well-developed and well-nourished. No distress.   HENT:   Head: Normocephalic and atraumatic.   Right Ear: External ear normal.   Left Ear: External ear normal.   Nose: Nose normal.   EEG in place  NGT in place   Eyes: Right eye exhibits no discharge. Left eye exhibits no discharge. No scleral icterus.   Neck: Normal range of motion.   Cardiovascular: Normal rate, regular rhythm and intact distal pulses.    Pulmonary/Chest: Effort normal. No respiratory distress. She has no wheezes.   Abdominal: Soft. She exhibits no distension. There is no tenderness.   Musculoskeletal: Normal range of motion. She exhibits no edema or tenderness.   Neurological: She is alert and oriented to person, place, and time. She exhibits normal muscle tone.   -  Mental Status:  AAOx3.  Follows commands.  Answers correct age and .  Recent and remote memory intact.  -  Speech and language:  no aphasia, mild dysarthria.    -  Facial movement (CN VII): symmetrical.   -  Motor:  RUE: 5/5.  LUE: 4/5.  RLE: 5/.  LLE: 4/5.  -  Tone:  Normal.  -  Sensory:  Intact to light touch and pin prick.   Skin: Skin is warm and dry. No rash noted.   Psychiatric: She has a normal mood and affect. Her behavior is normal. Thought content normal.   Vitals reviewed.    NEUROLOGICAL EXAMINATION:     MENTAL STATUS   Oriented to person, place, and time.       Diagnostic Results:   Labs: Reviewed  X-Ray:  Reviewed  CT: Reviewed  MRI: Reviewed  CTA: Reviewed

## 2018-06-12 NOTE — PLAN OF CARE
Problem: SLP Goal  Goal: SLP Goal  Speech Language Pathology Goals  Goals expected to be met by 6/17  1. Pt will participate in ongoing assessment of swallow.   2. Pt will complete speech, language, cognitive evaluation to determine need for tx. -MET 6/11  3. Pt will complete dysphagia therapy exercises x10 each given min cues.   4. Pt will utilize speech strategies in conversation to improve overall intelligibility.              Pt making progress toward goals.   Ania Dave M.S., [facility]-SLP  Speech Language Pathologist  [phone]  06/12/2018

## 2018-06-12 NOTE — PROGRESS NOTES
[facility]  Vascular Neurology  Comprehensive Stroke Center  Progress Note    Assessment/Plan:     * Embolic stroke involving right middle cerebral artery    R MCA syndrome, s/p IV tpA in ED. Dense left hemiplegia, gaze deviation to the right w/ limited visual field to the left. CT scan relatively unchanged, faint evidence of hyperdense mid/distal R M1.   NIHSS 21 on arrival.   Patient was taken to IR for thrombectomy around 2:30 pm.  Site of Occlusion: Right MCA  TICI Score: 3  9/11: Patient is improving clinically.      Antithrombotics for secondary stroke prevention: Antiplatelets: None: Hold all Antithrombotics x 24 hours after IV t-PA administration     Statins for secondary stroke prevention and hyperlipidemia, if present:   Statins: Atorvastatin- 40 mg daily     Aggressive risk factor modification: None     Rehab efforts: PT/OT/SLP to evaluate and treat     Diagnostics ordered/pending:  HgbA1C to assess blood glucose levels, Lipid Profile to assess cholesterol levels, MRI head without contrast to assess brain parenchyma, TTE to assess cardiac function/status      VTE prophylaxis: None: Reason for No Pharmacological VTE Prophylaxis: thrombolysis    BP parameters: Infarct: Post sucessful thrombectomy, SBP <140            Benign essential HTN    She is on amlodipine and olmesartan at home.   SBP goal is <140.              Patient was transferred from OSH with Right MCA stroke. She received tPA. She is going to IR for thrombectomy.   9/10 she had small area of hemorrhagic conversion. She is clinically improving. She failed her swallow eval.     STROKE DOCUMENTATION   Acute Stroke Times   Last Known Normal Date: 06/09/18  Symptom Onset Date: 06/09/18  Stroke Team Called Date: 06/09/18  Stroke Team Arrival Date: 06/09/18  CT Interpretation Time: 1411  Decision to Treat Time for IR: 1430    NIH Scale:  1a. Level Of Consciousness: 0-->Alert: keenly responsive  1b. LOC Questions: 0-->Answers both  questions correctly  1c. LOC Commands: 0-->Performs both tasks correctly  2. Best Gaze: 1-->Partial gaze palsy: gaze is abnormal in one or both eyes, but forced deviation or total gaze paresis is not present  3. Visual: 0-->No visual loss  4. Facial Palsy: 1-->Minor paralysis (flattened nasolabial fold, asymmetry on smiling)  5a. Motor Arm, Left: 0-->No drift: limb holds 90 (or 45) degrees for full 10 secs  5b. Motor Arm, Right: 0-->No drift: limb holds 90 (or 45) degrees for full 10 secs  6a. Motor Leg, Left: 0-->No drift: leg holds 30 degree position for full 5 secs  6b. Motor Leg, Right: 0-->No drift: leg holds 30 degree position for full 5 secs  7. Limb Ataxia: 0-->Absent  8. Sensory: 0-->Normal: no sensory loss  9. Best Language: 0-->No aphasia: normal  10. Dysarthria: 1-->Mild-to-moderate dysarthria: patient slurs at least some words and, at worst, can be understood with some difficulty  11. Extinction and Inattention (formerly Neglect): 0-->No abnormality  Total (NIH Stroke Scale): 3       Modified [place] Coma Scale:    ABCD2 Score:    [id number] Score:   HAS -BLED Score:   ICH Score:   Hunt & Morales Classification:      Hemorrhagic change of an Ischemic Stroke:  Does this patient have an ischemic stroke with hemorrhagic changes? Yes, Grading Scale: PH Type 1 (PH-1) = hematoma in < 30% of the infarcted area with some slight space-occupying effect. Is this a symptomatic change?  No - Hemorrhage is not clinically significant     Neurologic Chief Complaint: Right MCA stroke.     Subjective:     Interval History: Patient is seen for follow-up neurological assessment and treatment recommendations:   Patient has improved significantly despite developing a small area of hemorrhagic conversion. She has regained most of her strength back. She is still having facial weakness and difficulty swallowing.       HPI, Past Medical, Family, and Social History remains the same as documented in the initial  encounter.     Review of Systems   Constitutional: Negative for fever.   HENT: Positive for trouble swallowing.    Gastrointestinal: Negative for nausea and vomiting.   Neurological: Positive for facial asymmetry. Negative for weakness.     Scheduled Meds:   amLODIPine  5 mg Per NG tube Daily    aspirin  325 mg Per NG tube Daily    atorvastatin  40 mg Per NG tube Daily    clobetasol 0.05%   Topical (Top) BID    heparin (porcine)  5,000 Units Subcutaneous Q8H    magnesium oxide  800 mg Oral BID    senna-docusate 8.6-50 mg  1 tablet Oral Daily    sodium chloride 0.9%  3 mL Intravenous Q8H     Continuous Infusions:    PRN Meds:    Objective:     Vital Signs (Most Recent):  Temp: 98.4 °F (36.9 °C) (06/12/18 1105)  Pulse: 63 (06/12/18 1105)  Resp: (!) 23 (06/12/18 1105)  BP: (!) 142/65 (06/12/18 1105)  SpO2: 96 % (06/12/18 1105)  BP Location: Left arm    Vital Signs Range (Last 24H):  Temp:  [97.5 °F (36.4 °C)-98.9 °F (37.2 °C)]   Pulse:  [55-71]   Resp:  [16-44]   BP: (136-179)/(62-97)   SpO2:  [93 %-100 %]   BP Location: Left arm    Physical Exam   Constitutional: She appears well-developed and well-nourished. No distress.   HENT:   Head: Normocephalic and atraumatic.   Neck: Neck supple.   Cardiovascular: Normal rate.    No murmur heard.  Pulmonary/Chest: Effort normal.   Musculoskeletal: She exhibits no edema.   Neurological: She is alert. A cranial nerve deficit and sensory deficit is present. She exhibits abnormal muscle tone.   Skin: Skin is warm and dry.   Vitals reviewed.    Neurological Exam:   LOC: Alert  Attention Span: good  Language: No aphasia  Articulation: moderate Dysarthria  Visual Fields: intact   Facial Movement (CN VII):  lower facial weakness on the Left  Motor: Arm left  Plegia +4/5  Leg left  Plegia +4/5  Arm right  Normal 5/5  Leg right Normal 5/5  Sensation: intact     Laboratory:  CBC:     Recent Labs  Lab 06/12/18  0223   WBC 9.80   RBC 3.40*   HGB 10.1*   HCT 30.9*      MCV 91    MCH 29.7   MCHC 32.7       Diagnostic Results     Brain Imaging/Vessel Imaging   MRI 6/10   Large right MCA territory acute infarct with evidence of hemorrhagic reconversion as above.  Subtle subarachnoid hemorrhage overlying right frontal sulci.    CTH/CTA 6/9   CT head: Acute right MCA distribution infarct with associated mild localized mass effect.  No significant midline shift or acute intracranial hemorrhage.  Superimposed generalized cerebral volume loss and chronic microvascular ischemic change.  CTA head: Abrupt termination of the distal M1 segment of the right MCA concerning for large vessel occlusion with good filling of distal branches via collateral vessels.  Mild bilateral carotid atherosclerotic plaque with less than 50% hemodynamically significant stenosis by NASCET criteria.    Cardiac Imaging   CONCLUSIONS     1 - Moderate left atrial enlargement.     2 - Normal left ventricular systolic function (EF 60-65%).     3 - No wall motion abnormalities.     4 - Normal left ventricular diastolic function.     5 - Normal right ventricular systolic function .     6 - Mild to moderate aortic stenosis, ESTELITA = 1.17 cm2, AVAi = 0.68 cm2/m2, peak velocity = 2.75 m/s, mean gradient = 17 mmHg.     7 - Mild aortic regurgitation.     8 - The estimated PA systolic pressure is 25 mmHg.     9 - Mild mitral regurgitation.       Marga Rae MD  Comprehensive Stroke Center  Department of Vascular Neurology   [facility]

## 2018-06-12 NOTE — PROGRESS NOTES
[facility]  Neurocritical Care  Progress Note    Admit Date: 6/9/2018  Service Date: 06/11/2018  Length of Stay: 2    Subjective:     Chief Complaint: Embolic stroke involving right middle cerebral artery    History of Present Illness: Ms. Sewell is a 85 y/o female with PMH significant for HTN and bullous pemphigoid who presents to [facility] s/p tPA and R MCA thrombectomy. Patient developed acute onset LSW at 1130 am. Patient received TPA at 1245 pm and was then transferred to [facility] for thrombectomy. Patient had successful thrombectomy of R MCA with TICI 3 after two passes.     Hospital Course: 6/10: The patient had improvement in her dysarthria and L sided strength. She did not pass the bedside swallowing assessment.  S/P rTPA and R-M1 thrombectomy with TICI 3 flow. Her sheath is off. R MCA partial stroke inviolving the right  insular cortex and small hemorrhagic transformation.   6/11: Boarding for bed on neuro stepdown     Interval History: Boarding for bed on stroke stepdown unit overnight. Has failed SLP evaluation for swallowing, possibility for PEG discussed with family, including daughter, will give a couple more days before deciding need.     Review of Systems: Reports L weakness, slurred speech. Denies fever, nausea, vomiting, abdominal pain, blurred vision, HA.     Vitals:   Temp: 98.9 °F (37.2 °C)  Pulse: 68  Rhythm: normal sinus rhythm, sinus arrhythmia  BP: (!) 165/66  MAP (mmHg): 95  Resp: (!) 31  SpO2: (!) 94 %  O2 Device (Oxygen Therapy): nasal cannula    Temp  Min: 32 °F (0 °C)  Max: 99 °F (37.2 °C)  Pulse  Min: 53  Max: 74  BP  Min: 140/65  Max: 171/67  MAP (mmHg)  Min: 90  Max: 105  Resp  Min: 13  Max: 41  SpO2  Min: 94 %  Max: 100 %    06/10 0701 - 06/11 0700  In: 2428.8 [I.V.:2368.8]  Out: 1150 [Urine:1150]   Unmeasured Output  Urine Occurrence: 1  Stool Occurrence: 0     Examination:   Constitutional: Well-nourished and -developed. No apparent distress.   Eyes: Conjunctiva clear,  anicteric. Lids no lesions.  Head/Ears/Nose/Mouth/Throat/Neck: Moist mucous membranes. External ears, nose atraumatic.   Cardiovascular: Regular rhythm. No murmurs. No leg edema.  Respiratory: Comfortable respirations. Clear to auscultation.  Gastrointestinal: No hernia. Soft, nondistended, nontender. + bowel sounds.    Neurologic:  -GCS [id number]  -Alert. Oriented to person, place, and time. Speech slurred. Follows commands.  -L facial droop   -Motor L 4+/5, R 5/5  -Sensation intact    Medications:   Continuous Scheduled  amLODIPine 5 mg Daily   aspirin 325 mg Daily   [START ON 6/12/2018] atorvastatin 40 mg Daily   clobetasol 0.05%  BID   heparin (porcine) 5,000 Units Q8H   sodium chloride 0.9% 3 mL Q8H   PRN    Today I independently reviewed pertinent medications, lines/drains/airways,     Assessment/Plan:     Neuro   * Embolic stroke involving right middle cerebral artery    87 y/o female with PMH of HTN s/p TPA and thrombectomy for RMCA stroke   - Vascular Neurology following, boarding for bed on stepdown   - Statin   - ASA  - SBP goal 100-180  - PT/OT/SLP        Cardiac/Vascular   Benign essential HTN    - SBP goal 100-140  - Amlodipine 5 mg daily   - Echo completed, EF60, normal diastolic and systolic function             Prophylaxis:  Venous Thromboembolism: mechanical chemical  Stress Ulcer: NA  Ventilator Pneumonia: not applicable     Activity Orders          None        Full Code    Mari Butler PA-C  Neurocritical Care  [facility]

## 2018-06-12 NOTE — PT/OT/SLP PROGRESS
Occupational Therapy   Treatment    Name: Isa Sewell  MRN: [id number]  Admitting Diagnosis:  Embolic stroke involving right middle cerebral artery       Recommendations:     Discharge Recommendations: rehabilitation facility  Discharge Equipment Recommendations:   (TBD)  Barriers to discharge:  None    Subjective     Communicated with: nurse prior to session.  Pain/Comfort:  · Pain Rating 1: 0/10  · Pain Rating Post-Intervention 1: 0/10    Patients cultural, spiritual, Adventist conflicts given the current situation: None stated    Objective:     Patient found with: blood pressure cuff, NG tube, oxygen, peripheral IV, pulse ox (continuous), PureWick    General Precautions: Standard, aspiration, fall   Orthopedic Precautions:N/A   Braces: N/A     Occupational Performance:    Bed Mobility:    · Patient completed Rolling/Turning to Left with  minimum assistance  · Patient completed Rolling/Turning to Right with minimum assistance  · Patient completed Supine to Sit with moderate assistance  · Patient completed Sit to Supine with minimum assistance     Functional Mobility/Transfers:  · Activity did not occur      Activities of Daily Living:  · Grooming: stand by assistance Pt washed face with towel, washed hands and performed oral care using toothette while sitting unsupported on EOB with supervision . Pt sat EOB up to 18 minutes    Patient left HOB elevated with all lines intact, call button in reach, nurse notified and family present    [facility] 6 Click:  [facility] Total Score: 15    Treatment & Education:  Pt edu on OT role/POC  - White board updated  - Several self care tasks competed-- as noted above   - She performed dynamic sitting activity incorporating reaching in all planes   while sitting unsupported EOB and working on proximal stability to increase  dynamic balance, strength, and endurance for increased independence with ADL tasks.  He tolerated well with for up to 18 minutes with SBA/ Superviwion for dynamic  sitting balance.   -- Pt completed BUE AROM exercises while seated EOB: 1x10 reps with  2 pound dowel for shoulder presses, chest presses, Horizontal Ad/Adduction , and biceps curls. Pt tolerated well with SBA / (S) to maintain sitting EOB    Rest breaks taken between  each exercise set..   Education:    Assessment:     Isa Sewell is a 87 y.o. female with a medical diagnosis of Embolic stroke involving right middle cerebral artery.  She presents with performance deficits affecting function consisting of weakness, impaired endurance, impaired self care skills, impaired functional mobilty, gait instability, impaired balance, decreased coordination, decreased upper extremity function, decreased lower extremity function, decreased safety awareness, impaired coordination, impaired fine motor, impaired cardiopulmonary response to activity.      Pt is making slow progress toward meeting goals.    Rehab Prognosis:  good; patient would benefit from acute skilled OT services to address these deficits and reach maximum level of function.       Plan:     Patient to be seen 4 x/week to address the above listed problems via self-care/home management, therapeutic activities, therapeutic exercises, neuromuscular re-education  · Plan of Care Expires: 07/10/18  · Plan of Care Reviewed with: patient, family    This Plan of care has been discussed with the patient who was involved in its development and understands and is in agreement with the identified goals and treatment plan    GOALS:    Occupational Therapy Goals        Problem: Occupational Therapy Goal    Goal Priority Disciplines Outcome Interventions   Occupational Therapy Goal     OT, PT/OT Ongoing (interventions implemented as appropriate)    Description:  Goals to be met by: 6/20/2018    Patient will increase functional independence with ADLs by performing:    UE Dressing with Contact Guard Assistance.  LE Dressing with Moderate Assistance.  Grooming while standing  with Minimal Assistance.  Toileting from bedside commode with Minimal Assistance for hygiene and clothing management.   Sitting at edge of bed x 20 minutes with Stand by Assistance.  Supine to sit with Minimal Assistance.  Stand pivot transfers with Minimal Assistance.  Toilet transfer to bedside commode with Minimal Assistance.                      Time Tracking:     OT Date of Treatment: 06/12/18  OT Start Time: 1028  OT Stop Time: 1051  OT Total Time (min): 23 min    Billable Minutes:Self care, 13  Therapeutic activity 10     Gary Alejandra OTR/L  6/12/2018

## 2018-06-12 NOTE — HOSPITAL COURSE
6/10/18:  Evaluated by PT, OT, and SLP.  Found to have dysphagia and dysarthria.  SLP recommendation: NPO.  Bed mobility min-maxA.  EOB Kary.  Sit to stand min-maxA and transfers Kary.  Ambulated 5 ft x 2 Kary.  UBD modA, grooming SBA, and toileting totalA.   6/11/18:  Participated with SLP.  SLP recommendation: puree diet and honey thick liquids for pleasure feeding only.   6/12/18:  Participated with PT, OT, and SLP.  SLP recommendation: NPO.  Bed mobility min-modA.  EOB SV-Kary.  Sit to stand Kary and stand to sit Kary.  Ambulated 6-7 ft x 3 trials Kary.  Grooming SBA.    6/13/18:  Passed MBSS.  SLP recommendation: puree diet and nectar thick liquids.

## 2018-06-12 NOTE — ASSESSMENT & PLAN NOTE
- SBP goal 100-140  - Amlodipine 5 mg daily   - Echo completed, EF60, normal diastolic and systolic function

## 2018-06-12 NOTE — PT/OT/SLP PROGRESS
"Speech Language Pathology Treatment    Patient Name:  Isa Sewell   MRN:  [id number]  Admitting Diagnosis: Embolic stroke involving right middle cerebral artery    Recommendations:                 General Recommendations:  Dysphagia therapy and Speech/language therapy  Diet recommendations:  NPO, Liquid Diet Level: NPO   Aspiration Precautions: Frequent oral care   General Precautions: Standard, aspiration, fall  Communication strategies:  none    Subjective     "I'm doing find dear".   Family members present.     Pain/Comfort:  · Pain Rating 1: 0/10  · Pain Rating Post-Intervention 2: 0/10    Objective:     Has the patient been evaluated by SLP for swallowing?   Yes  Keep patient NPO? Yes   Current Respiratory Status: nasal cannula      Pt oriented X4.  Seen for ongoing swallow assessment/dysphagia therapy. Pt consumed puree trials via 1/2tsp followed by 1/4 tsp. Positive s/s airway compromise noted c/b throat clear, overt coughing, eyes watering, and flushed pallor to face.  No further trials administered. Dysphagia exercises completed for BOT retraction (forceful /k/ and /g/) x5 each, effortful swallows x5, cee on 2/5 opportunities, and chin tuck with resistance for ~15s. Handout on dysphagia exercises along with frequency of completion (3-5x each, 3x per day)  provided to Pt and family member.   Skilled education provided to Pt and family member re: s/s airway compromise, diet recommendations for NPO at this time with rationale, MBSS and rationale, and ST POC. Pt and family members verbalized understanding and are in agreement with ST POC.   ST discussed with MD via face to face discussion outside Pt's room on possible MBSS - MD agreed.     Assessment:     Isa Sewell is a 87 y.o. female with an SLP diagnosis of Dysphagia and Dysarthria.      Goals:    SLP Goals        Problem: SLP Goal    Goal Priority Disciplines Outcome   SLP Goal     SLP Ongoing (interventions implemented as appropriate) "   Description:  Speech Language Pathology Goals  Goals expected to be met by 6/17  1. Pt will participate in ongoing assessment of swallow.   2. Pt will complete speech, language, cognitive evaluation to determine need for tx. -MET 6/11  3. Pt will complete dysphagia therapy exercises x10 each given min cues.   4. Pt will utilize speech strategies in conversation to improve overall intelligibility.                             Plan:     · Patient to be seen:  5 x/week   · Plan of Care expires:  07/10/18  · Plan of Care reviewed with:  patient, family   · SLP Follow-Up:  Yes       Discharge recommendations:  rehabilitation facility       Time Tracking:     SLP Treatment Date:   06/11/18  Speech Start Time:  1049  Speech Stop Time:  1116     Speech Total Time (min):  27 min    Billable Minutes: Treatment Swallowing Dysfunction 27    Ania Dave M.S., [facility]-SLP  Speech Language Pathologist  [phone]  06/12/2018

## 2018-06-12 NOTE — PLAN OF CARE
SW met with Pt family at bedside. They are between [facility] and [facility]. They are planning to tour tomorrow. SW to send referrals and they will give preferences asap.     INEZ sent referrals via .    Rhona Rausch LMSW  Neurocritical Care   [facility]  [phone]

## 2018-06-12 NOTE — PLAN OF CARE
Problem: Patient Care Overview  Goal: Plan of Care Review  Outcome: Ongoing (interventions implemented as appropriate)  POC reviewed with patient and family at 1215. Patient verbalized understanding. Questions and concerns addressed with family and patient. No acute events today. Patient turned per self. Neuro q 4 hr. Patient is awaiting transfer orders. Patient progressing toward goals. RN will continue to monitor. See flowsheets for full assessment and VS info.

## 2018-06-12 NOTE — PLAN OF CARE
Problem: Occupational Therapy Goal  Goal: Occupational Therapy Goal  Goals to be met by: 6/20/2018    Patient will increase functional independence with ADLs by performing:    UE Dressing with Contact Guard Assistance.  LE Dressing with Moderate Assistance.  Grooming while standing with Minimal Assistance.  Toileting from bedside commode with Minimal Assistance for hygiene and clothing management.   Sitting at edge of bed x 20 minutes with Stand by Assistance.  Supine to sit with Minimal Assistance.  Stand pivot transfers with Minimal Assistance.  Toilet transfer to bedside commode with Minimal Assistance.     Outcome: Ongoing (interventions implemented as appropriate)  Gary Alejandra, OTR/L      6/12/2018

## 2018-06-13 PROBLEM — E78.5 HYPERLIPIDEMIA: Status: ACTIVE | Noted: 2018-06-13

## 2018-06-13 LAB
ALBUMIN SERPL BCP-MCNC: 2.5 G/DL
ALP SERPL-CCNC: 72 U/L
ALT SERPL W/O P-5'-P-CCNC: 20 U/L
ANION GAP SERPL CALC-SCNC: 8 MMOL/L
AST SERPL-CCNC: 18 U/L
BASOPHILS # BLD AUTO: 0.06 K/UL
BASOPHILS NFR BLD: 0.6 %
BILIRUB SERPL-MCNC: 0.5 MG/DL
BUN SERPL-MCNC: 18 MG/DL
CALCIUM SERPL-MCNC: 8.9 MG/DL
CHLORIDE SERPL-SCNC: 107 MMOL/L
CO2 SERPL-SCNC: 27 MMOL/L
CREAT SERPL-MCNC: 0.7 MG/DL
DIFFERENTIAL METHOD: ABNORMAL
EOSINOPHIL # BLD AUTO: 0.2 K/UL
EOSINOPHIL NFR BLD: 1.4 %
ERYTHROCYTE [DISTWIDTH] IN BLOOD BY AUTOMATED COUNT: 14.3 %
EST. GFR  (AFRICAN AMERICAN): >60 ML/MIN/1.73 M^2
EST. GFR  (NON AFRICAN AMERICAN): >60 ML/MIN/1.73 M^2
GLUCOSE SERPL-MCNC: 148 MG/DL
HCT VFR BLD AUTO: 32.4 %
HGB BLD-MCNC: 10.2 G/DL
IMM GRANULOCYTES # BLD AUTO: 0.16 K/UL
IMM GRANULOCYTES NFR BLD AUTO: 1.5 %
LYMPHOCYTES # BLD AUTO: 1.3 K/UL
LYMPHOCYTES NFR BLD: 12 %
MAGNESIUM SERPL-MCNC: 1.8 MG/DL
MCH RBC QN AUTO: 29.5 PG
MCHC RBC AUTO-ENTMCNC: 31.5 G/DL
MCV RBC AUTO: 94 FL
MONOCYTES # BLD AUTO: 1 K/UL
MONOCYTES NFR BLD: 9.3 %
NEUTROPHILS # BLD AUTO: 8.1 K/UL
NEUTROPHILS NFR BLD: 75.2 %
NRBC BLD-RTO: 0 /100 WBC
PHOSPHATE SERPL-MCNC: 3.1 MG/DL
PLATELET # BLD AUTO: 191 K/UL
PMV BLD AUTO: 10.5 FL
POCT GLUCOSE: 101 MG/DL (ref 70–110)
POCT GLUCOSE: 109 MG/DL (ref 70–110)
POCT GLUCOSE: 109 MG/DL (ref 70–110)
POCT GLUCOSE: 139 MG/DL (ref 70–110)
POTASSIUM SERPL-SCNC: 4 MMOL/L
PROT SERPL-MCNC: 5.9 G/DL
RBC # BLD AUTO: 3.46 M/UL
SODIUM SERPL-SCNC: 142 MMOL/L
WBC # BLD AUTO: 10.82 K/UL

## 2018-06-13 PROCEDURE — 25000003 PHARM REV CODE 250: Performed by: HOSPITALIST

## 2018-06-13 PROCEDURE — 83735 ASSAY OF MAGNESIUM: CPT

## 2018-06-13 PROCEDURE — 97530 THERAPEUTIC ACTIVITIES: CPT

## 2018-06-13 PROCEDURE — 25000003 PHARM REV CODE 250: Performed by: PHYSICIAN ASSISTANT

## 2018-06-13 PROCEDURE — G8997 SWALLOW GOAL STATUS: HCPCS | Mod: CI

## 2018-06-13 PROCEDURE — 25000003 PHARM REV CODE 250: Performed by: STUDENT IN AN ORGANIZED HEALTH CARE EDUCATION/TRAINING PROGRAM

## 2018-06-13 PROCEDURE — 80053 COMPREHEN METABOLIC PANEL: CPT

## 2018-06-13 PROCEDURE — 92611 MOTION FLUOROSCOPY/SWALLOW: CPT

## 2018-06-13 PROCEDURE — A4216 STERILE WATER/SALINE, 10 ML: HCPCS | Performed by: HOSPITALIST

## 2018-06-13 PROCEDURE — 20600001 HC STEP DOWN PRIVATE ROOM

## 2018-06-13 PROCEDURE — 84100 ASSAY OF PHOSPHORUS: CPT

## 2018-06-13 PROCEDURE — 99233 SBSQ HOSP IP/OBS HIGH 50: CPT | Mod: GC,,, | Performed by: PSYCHIATRY & NEUROLOGY

## 2018-06-13 PROCEDURE — 63600175 PHARM REV CODE 636 W HCPCS: Performed by: PHYSICIAN ASSISTANT

## 2018-06-13 PROCEDURE — G8996 SWALLOW CURRENT STATUS: HCPCS | Mod: CK

## 2018-06-13 PROCEDURE — 85025 COMPLETE CBC W/AUTO DIFF WBC: CPT

## 2018-06-13 PROCEDURE — 36415 COLL VENOUS BLD VENIPUNCTURE: CPT

## 2018-06-13 PROCEDURE — 97116 GAIT TRAINING THERAPY: CPT

## 2018-06-13 PROCEDURE — 25000003 PHARM REV CODE 250: Performed by: PSYCHIATRY & NEUROLOGY

## 2018-06-13 RX ORDER — ATORVASTATIN CALCIUM 20 MG/1
40 TABLET, FILM COATED ORAL DAILY
Status: DISCONTINUED | OUTPATIENT
Start: 2018-06-14 | End: 2018-06-15 | Stop reason: HOSPADM

## 2018-06-13 RX ORDER — ASPIRIN 325 MG
325 TABLET ORAL DAILY
Status: DISCONTINUED | OUTPATIENT
Start: 2018-06-14 | End: 2018-06-15 | Stop reason: HOSPADM

## 2018-06-13 RX ORDER — AMLODIPINE BESYLATE 10 MG/1
10 TABLET ORAL DAILY
Status: DISCONTINUED | OUTPATIENT
Start: 2018-06-13 | End: 2018-06-13

## 2018-06-13 RX ORDER — AMLODIPINE BESYLATE 10 MG/1
10 TABLET ORAL DAILY
Status: DISCONTINUED | OUTPATIENT
Start: 2018-06-14 | End: 2018-06-15 | Stop reason: HOSPADM

## 2018-06-13 RX ORDER — CLOPIDOGREL BISULFATE 75 MG/1
75 TABLET ORAL DAILY
Status: DISCONTINUED | OUTPATIENT
Start: 2018-06-13 | End: 2018-06-15 | Stop reason: HOSPADM

## 2018-06-13 RX ADMIN — CLOPIDOGREL 75 MG: 75 TABLET, FILM COATED ORAL at 06:06

## 2018-06-13 RX ADMIN — CLOBETASOL PROPIONATE: 0.5 OINTMENT TOPICAL at 06:06

## 2018-06-13 RX ADMIN — CLOBETASOL PROPIONATE: 0.5 OINTMENT TOPICAL at 09:06

## 2018-06-13 RX ADMIN — ATORVASTATIN CALCIUM 40 MG: 20 TABLET, FILM COATED ORAL at 10:06

## 2018-06-13 RX ADMIN — AMLODIPINE BESYLATE 10 MG: 10 TABLET ORAL at 10:06

## 2018-06-13 RX ADMIN — HEPARIN SODIUM 5000 UNITS: 5000 INJECTION, SOLUTION INTRAVENOUS; SUBCUTANEOUS at 09:06

## 2018-06-13 RX ADMIN — HEPARIN SODIUM 5000 UNITS: 5000 INJECTION, SOLUTION INTRAVENOUS; SUBCUTANEOUS at 05:06

## 2018-06-13 RX ADMIN — Medication 3 ML: at 05:06

## 2018-06-13 RX ADMIN — HEPARIN SODIUM 5000 UNITS: 5000 INJECTION, SOLUTION INTRAVENOUS; SUBCUTANEOUS at 06:06

## 2018-06-13 RX ADMIN — ASPIRIN 325 MG ORAL TABLET 325 MG: 325 PILL ORAL at 10:06

## 2018-06-13 RX ADMIN — STANDARDIZED SENNA CONCENTRATE AND DOCUSATE SODIUM 1 TABLET: 8.6; 5 TABLET, FILM COATED ORAL at 10:06

## 2018-06-13 RX ADMIN — Medication 3 ML: at 10:06

## 2018-06-13 NOTE — PLAN OF CARE
Problem: Patient Care Overview  Goal: Plan of Care Review  Outcome: Ongoing (interventions implemented as appropriate)  No acute issues, Pt had MBSS and passed, advanced to pureed diet with nectar thick liquids- tolerating very well. Daughter and  left to visit  rehab, grandson stayed with Pt. She seems very eager to regain independence and is very cooperative in participating in her care.

## 2018-06-13 NOTE — PROGRESS NOTES
[facility]  Vascular Neurology  Comprehensive Stroke Center  Progress Note    Assessment/Plan:     * Embolic stroke involving right middle cerebral artery    86 yr old female with medical history of HTN presenting with R MCA syndrome, s/p IV tpA in ED.     - CTA: Right distal M1 / Mild bilateral carotid atherosclerotic plaque with less than 50%  - MRI: Right MCA - right insular cortex and right frontal lobe / PH1   - IR thrombectomy / TICI 3   - Echo: Moderate left atrial enlargement.     - ESUS - Likely cardioembolic     - 6/12: NICU step down / Improvement in NIHSS - Left sided drift / facial droop / dysarthria +      Antithrombotics for secondary stroke prevention: Antiplatelets:  / Plavix 75      Statins for secondary stroke prevention and hyperlipidemia, if present: Statins: Atorvastatin- 40 mg daily     Aggressive risk factor modification: None     Rehab efforts: PT/OT/SLP to evaluate and treat     Diagnostics ordered/pending:  None      VTE prophylaxis: Heparin     BP parameters: Infarct: Post sucessful thrombectomy, SBP <140          Hyperlipidemia    -    - Maintain high potency statins         Benign essential HTN    - on amlodipine and olmesartan at home.   - SBP goal is <140.         Dysphagia    - 6/13: Cleared MBSS for puree         Cytotoxic cerebral edema    - evident on imaging         Pemphigus    - continue home medications              Patient was transferred from OSH with Right MCA stroke. She received tPA. She is going to IR for thrombectomy.   9/10 she had small area of hemorrhagic conversion. She is clinically improving. She failed her swallow eval.     6/13: Neuro ICU step down / Has extinction & mild LSW  + / SLP cleared for puree     STROKE DOCUMENTATION   Acute Stroke Times   Last Known Normal Date: 06/09/18  Symptom Onset Date: 06/09/18  Stroke Team Called Date: 06/09/18  Stroke Team Arrival Date: 06/09/18  CT Interpretation Time: 1411  Decision to Treat  Time for IR: 1430    NIH Scale:  1a. Level Of Consciousness: 0-->Alert: keenly responsive  1b. LOC Questions: 1-->Answers one question correctly  1c. LOC Commands: 0-->Performs both tasks correctly  2. Best Gaze: 0-->Normal  3. Visual: 0-->No visual loss  4. Facial Palsy: 1-->Minor paralysis (flattened nasolabial fold, asymmetry on smiling)  5a. Motor Arm, Left: 1-->Drift: limb holds 90 (or 45) degrees, but drifts down before full 10 seconds: does not hit bed or other support  5b. Motor Arm, Right: 0-->No drift: limb holds 90 (or 45) degrees for full 10 secs  6a. Motor Leg, Left: 1-->Drift: leg falls by the end of the 5-sec period but does not hit bed  6b. Motor Leg, Right: 0-->No drift: leg holds 30 degree position for full 5 secs  7. Limb Ataxia: 0-->Absent  8. Sensory: 1-->Mild-to-moderate sensory loss: patient feels pinprick is less sharp or is dull on the affected side: or there is a loss of superficial pain with pinprick, but patient is aware of being touched  9. Best Language: 0-->No aphasia: normal  10. Dysarthria: 1-->Mild-to-moderate dysarthria: patient slurs at least some words and, at worst, can be understood with some difficulty  11. Extinction and Inattention (formerly Neglect): 1-->Visual, tactile, auditory, spatial, or personal inattention or extinction to bilateral simultaneous stimulation in one of the sensory modalities  Total (NIH Stroke Scale): 7       Modified Kenzie    Denzel Coma Scale:    ABCD2 Score:    [id number] Score:   HAS -BLED Score:   ICH Score:   Hunt & Morales Classification:      Hemorrhagic change of an Ischemic Stroke: Does this patient have an ischemic stroke with hemorrhagic changes? Yes, Grading Scale: PH Type 1 (PH-1) = hematoma in < 30% of the infarcted area with some slight space-occupying effect. Is this a symptomatic change?  No - Hemorrhage is not clinically significant     Neurologic Chief Complaint: Right embolic MCA stroke     Subjective:     Interval History:  Patient is seen for follow-up neurological assessment and treatment recommendations:    SHANIQUA. No concerns for worsening NIHSS     HPI, Past Medical, Family, and Social History remains the same as documented in the initial encounter.     Review of Systems   Constitutional: Negative for chills and fever.   HENT: Negative for trouble swallowing.    Respiratory: Negative for shortness of breath.    Cardiovascular: Negative for chest pain.   Genitourinary: Negative for dysuria.   Neurological: Positive for facial asymmetry, weakness and numbness. Negative for tremors and headaches.   Psychiatric/Behavioral: Negative for agitation, behavioral problems and confusion.     Scheduled Meds:   [START ON 6/14/2018] amLODIPine  10 mg Oral Daily    [START ON 6/14/2018] aspirin  325 mg Oral Daily    [START ON 6/14/2018] atorvastatin  40 mg Oral Daily    clobetasol 0.05%   Topical (Top) BID    clopidogrel  75 mg Oral Daily    heparin (porcine)  5,000 Units Subcutaneous Q8H    senna-docusate 8.6-50 mg  1 tablet Oral Daily    sodium chloride 0.9%  3 mL Intravenous Q8H     Continuous Infusions:  PRN Meds:dextrose 50%, glucagon (human recombinant), insulin aspart U-100    Objective:     Vital Signs (Most Recent):  Temp: 98.5 °F (36.9 °C) (06/13/18 1109)  Pulse: (!) 58 (06/13/18 1109)  Resp: 18 (06/13/18 1109)  BP: (!) 154/68 (06/13/18 1109)  SpO2: 96 % (06/13/18 1109)  BP Location: Left arm    Vital Signs Range (Last 24H):  Temp:  [97.3 °F (36.3 °C)-99.4 °F (37.4 °C)]   Pulse:  [58-70]   Resp:  [16-36]   BP: (132-164)/(57-96)   SpO2:  [92 %-98 %]   BP Location: Left arm    Physical Exam   Constitutional: No distress.   HENT:   Head: Normocephalic and atraumatic.   Eyes: EOM are normal.   Neck: Normal range of motion. Neck supple.   Cardiovascular:   No murmur heard.  Pulmonary/Chest: No respiratory distress.   Abdominal: Soft.   Musculoskeletal: She exhibits no deformity.       Neurological Exam:   LOC: alert  Attention Span:  Good   Language: No aphasia  Articulation: Dysarthria  Orientation: Not oriented to time  Visual Fields: Inferior quadrantanopsia: left  EOM (CN III, IV, VI): Full/intact  Pupils (CN II, III): PERRL  Facial Sensation (CN V): Normal  Facial Movement (CN VII): Lower facial weakness on the Left  Gag Reflex: present  Reflexes: 2+ throughout  Motor: Arm left  Paresis: 4/5  Leg left  Paresis: 4/5  Arm right  Normal 5/5  Leg right Normal 5/5  Cebellar: No evidence of appendicular or axial ataxia  Sensation: Fawad-hypoesthesia left  Tone: Normal tone throughout    Laboratory:  CMP:   Recent Labs  Lab 06/13/18  0418   CALCIUM 8.9   ALBUMIN 2.5*   PROT 5.9*      K 4.0   CO2 27      BUN 18   CREATININE 0.7   ALKPHOS 72   ALT 20   AST 18   BILITOT 0.5     BMP:   Recent Labs  Lab 06/13/18  0418      K 4.0      CO2 27   BUN 18   CREATININE 0.7   CALCIUM 8.9     CBC:   Recent Labs  Lab 06/13/18  0418   WBC 10.82   RBC 3.46*   HGB 10.2*   HCT 32.4*      MCV 94   MCH 29.5   MCHC 31.5*     Lipid Panel:   Recent Labs  Lab 06/09/18  1712   CHOL 159   LDLCALC 109.4   HDL 36*   TRIG 68     Coagulation:   Recent Labs  Lab 06/09/18  1208   INR 1.0     Platelet Aggregation Study: No results for input(s): PLTAGG, PLTAGINTERP, PLTAGREGLACO, ADPPLTAGGREG in the last 168 hours.  Hgb A1C:   Recent Labs  Lab 06/09/18  1712   HGBA1C 6.2*     TSH:   Recent Labs  Lab 06/09/18  1712   TSH 1.507       Diagnostic Results     Brain Imaging     MRI 6/10   Large right MCA territory acute infarct with evidence of hemorrhagic reconversion as above.  Subtle subarachnoid hemorrhage overlying right frontal sulci.     CTH/CTA 6/9   CT head: Acute right MCA distribution infarct with associated mild localized mass effect.  No significant midline shift or acute intracranial hemorrhage.  Superimposed generalized cerebral volume loss and chronic microvascular ischemic change.  CTA head: Abrupt termination of the distal M1 segment of the  right MCA concerning for large vessel occlusion with good filling of distal branches via collateral vessels.  Mild bilateral carotid atherosclerotic plaque with less than 50% hemodynamically significant stenosis by NASCET criteria.     Cardiac Imaging   CONCLUSIONS     1 - Moderate left atrial enlargement.     2 - Normal left ventricular systolic function (EF 60-65%).     3 - No wall motion abnormalities.     4 - Normal left ventricular diastolic function.     5 - Normal right ventricular systolic function .     6 - Mild to moderate aortic stenosis, ESTELITA = 1.17 cm2, AVAi = 0.68 cm2/m2, peak velocity = 2.75 m/s, mean gradient = 17 mmHg.     7 - Mild aortic regurgitation.     8 - The estimated PA systolic pressure is 25 mmHg.     9 - Mild mitral regurgitation.       Eduardo Lo MD  Comprehensive Stroke Center  Department of Vascular Neurology   [facility]

## 2018-06-13 NOTE — PLAN OF CARE
INEZ spoke with Fely with [facility] ([phone]). She has spoken with the family and is anticipating a tour today. They would be ready to take her by tomorrow if the family agrees to it. Advised her of the new INEZ contact info as well. She will call her after the family comes in for the tour.    Rhona Rausch, VASHTI  Neurocritical Care   [facility]  [phone]

## 2018-06-13 NOTE — NURSING
This RN assumed care of this pt upon her arrival from the ICU.  Upon arrival, pt was neurologically stable with L sided droop and weakness, isosource 1.5 infusing @ goal rate of 40, CBS checked Q 6 hr., skin CDI, turning assistance provided Q 2 hr., pt. Using bedpan with skin care provided. Fall precautions in place including bed alarm. Will continue to monitor.

## 2018-06-13 NOTE — PROGRESS NOTES
[facility]  Physical Medicine & Rehab  Progress Note    Patient Name: Isa Sewell  MRN: [id number]  Admission Date: 6/9/2018  Length of Stay: 4 days  Attending Physician: Hardeep Schmid MD;August*    Subjective:     Principal Problem:Embolic stroke involving right middle cerebral artery    Hospital Course:   6/10/18:  Evaluated by PT, OT, and SLP.  Found to have dysphagia and dysarthria.  SLP recommendation: NPO.  Bed mobility min-maxA.  EOB Kary.  Sit to stand min-maxA and transfers Kary.  Ambulated 5 ft x 2 Kary.  UBD modA, grooming SBA, and toileting totalA.   6/11/18:  Participated with SLP.  SLP recommendation: puree diet and honey thick liquids for pleasure feeding only.   6/12/18:  Participated with PT, OT, and SLP.  SLP recommendation: NPO.  Bed mobility min-modA.  EOB SV-Kary.  Sit to stand Kary and stand to sit Kary.  Ambulated 6-7 ft x 3 trials Kary.  Grooming SBA.    6/13/18:  Passed MBSS.  SLP recommendation: puree diet and nectar thick liquids.    Interval History 6/13/2018:  Patient is seen for follow-up rehab evaluation and recommendations: No acute events over night.  Stepped down to floor.  Passed MBSS.  Participating with therapy,looking forward to rehab.     HPI, Past Medical, Family, and Social History remains the same as documented in the initial encounter.    Scheduled Medications:    [START ON 6/14/2018] amLODIPine  10 mg Oral Daily    [START ON 6/14/2018] aspirin  325 mg Oral Daily    [START ON 6/14/2018] atorvastatin  40 mg Oral Daily    clobetasol 0.05%   Topical (Top) BID    clopidogrel  75 mg Oral Daily    heparin (porcine)  5,000 Units Subcutaneous Q8H    senna-docusate 8.6-50 mg  1 tablet Oral Daily    sodium chloride 0.9%  3 mL Intravenous Q8H     PRN Medications: dextrose 50%, glucagon (human recombinant), insulin aspart U-100    Review of Systems   Constitutional: Positive for fatigue. Negative for chills and fever.   HENT: Positive for trouble  swallowing. Negative for drooling, hearing loss and voice change.    Eyes: Negative for pain and visual disturbance.   Respiratory: Negative for cough, shortness of breath and wheezing.    Cardiovascular: Negative for chest pain and palpitations.   Gastrointestinal: Negative for abdominal distention, nausea and vomiting.   Genitourinary: Negative for difficulty urinating and flank pain.   Musculoskeletal: Positive for gait problem. Negative for arthralgias, back pain, myalgias and neck pain.   Skin: Negative for rash and wound.   Neurological: Positive for speech difficulty and weakness. Negative for dizziness, numbness and headaches.   Psychiatric/Behavioral: Negative for agitation and hallucinations. The patient is not nervous/anxious.      Objective:     Vital Signs (Most Recent):  Temp: 98.5 °F (36.9 °C) (06/13/18 1109)  Pulse: (!) 58 (06/13/18 1109)  Resp: 18 (06/13/18 1109)  BP: (!) 154/68 (06/13/18 1109)  SpO2: 96 % (06/13/18 1109)    Vital Signs (24h Range):  Temp:  [97.3 °F (36.3 °C)-99.4 °F (37.4 °C)] 98.5 °F (36.9 °C)  Pulse:  [58-70] 58  Resp:  [16-36] 18  SpO2:  [92 %-98 %] 96 %  BP: (132-172)/(57-96) 154/68     Physical Exam   Constitutional: She is oriented to person, place, and time. She appears well-developed and well-nourished. No distress.   HENT:   Head: Normocephalic and atraumatic.   Right Ear: External ear normal.   Left Ear: External ear normal.   Nose: Nose normal.   Eyes: Right eye exhibits no discharge. Left eye exhibits no discharge. No scleral icterus.   Neck: Normal range of motion.   Cardiovascular: Normal rate, regular rhythm and intact distal pulses.    Pulmonary/Chest: Effort normal. No respiratory distress. She has no wheezes.   Abdominal: Soft. She exhibits no distension. There is no tenderness.   Musculoskeletal: She exhibits no edema or tenderness.        Left hand: She exhibits decreased range of motion and swelling.   Neurological: She is alert and oriented to person, place,  and time. She exhibits normal muscle tone.   -  Mental Status:  AAOx3.  Follows commands.  Answers correct age and .  Recent and remote memory intact.  -  Speech and language:  no aphasia, mild dysarthria.    -  Facial movement (CN VII): symmetrical.   -  Motor:  RUE: 5/5.  LUE: 4/5.  RLE: 5/5.  LLE: 4-/5.  -  Tone:  Normal.  -  Sensory:  Intact to light touch and pin prick.   Skin: Skin is warm and dry. Bruising and ecchymosis noted. No rash noted.   Psychiatric: She has a normal mood and affect. Her behavior is normal. Thought content normal.   Vitals reviewed.    Diagnostic Results:   Labs: Reviewed  X-Ray: Reviewed  CT: Reviewed  MRI: Reviewed  CTA: Reviewed    Assessment/Plan:      * Embolic stroke involving right middle cerebral artery    -  Presented with acute onset left-sided weakness, right gaze preference, and facial droop  -  CTH without acute pathology s/p tPA  -  CTA multiphase showed acute R MCA infarct with distal R M1 occlusion s/p thrombectomy  -  MRI brain re-demonstrated large R MCA infarction with small area of hemorrhagic conversion  See hospital course for functional, cognitive/speech/language, and nutrition/swallow status.      Recommendations  -  Encourage mobility, OOB in chair, and early ambulation as appropriate   -  PT/OT evaluate and treat  -  SLP speech and cognitive evaluate and treat  -  Monitor mood and sleep disturbances  -  Establish consistent sleep-wake cycle  -  Monitor for bowel and bladder dysfunction  -  Monitor for shoulder pain and subluxation  -  Monitor for spasticity  -  Monitor for and prevent skin breakdown and pressure ulcers  · Early mobility, repositioning/weight shifting every 20-30 minutes when sitting, turn patient every 2 hours, proper mattress/overlay and chair cushioning, pressure relief/heel protector boots  -  DVT prophylaxis:  BESS, SCD        Recommend Inpatient Rehab.  Patient/family prefer [facility].  Will sign off.  Please call with  questions/concerns or re-consult if situation changes.    LORENZA Hazel  Department of Physical Medicine & Rehab   [facility]

## 2018-06-13 NOTE — PLAN OF CARE
Problem: Physical Therapy Goal  Goal: Physical Therapy Goal    Goals to be met by 6/22/2018    1. Pt will perform rolling to the R and L with SBA.   2. Pt will perform supine to sit from both sides of the bed with SBA.  3. Pt will perform sit to supine with SBA.  4. Pt will perform sit to stand transfers with SBA with no AD.    5. Pt will perform bed <> chair transfers with SBA with no AD.  6. Pt will perform gait x 150 feet with SBA and no AD.       Discharge Recommendations: Rehab    Pt safe to transfer with RN/PCT via SPT: Use no AD with min A of 1 person.    Goals remain appropriate.     Darby Fontana, PTA.   [phone]   6/13/2018

## 2018-06-13 NOTE — SUBJECTIVE & OBJECTIVE
Interval History 6/13/2018:  Patient is seen for follow-up rehab evaluation and recommendations: No acute events over night.  Stepped down to floor.  Passed MBSS.  Participating with therapy,looking forward to rehab.     HPI, Past Medical, Family, and Social History remains the same as documented in the initial encounter.    Scheduled Medications:    [START ON 6/14/2018] amLODIPine  10 mg Oral Daily    [START ON 6/14/2018] aspirin  325 mg Oral Daily    [START ON 6/14/2018] atorvastatin  40 mg Oral Daily    clobetasol 0.05%   Topical (Top) BID    clopidogrel  75 mg Oral Daily    heparin (porcine)  5,000 Units Subcutaneous Q8H    senna-docusate 8.6-50 mg  1 tablet Oral Daily    sodium chloride 0.9%  3 mL Intravenous Q8H     PRN Medications: dextrose 50%, glucagon (human recombinant), insulin aspart U-100    Review of Systems   Constitutional: Positive for fatigue. Negative for chills and fever.   HENT: Positive for trouble swallowing. Negative for drooling, hearing loss and voice change.    Eyes: Negative for pain and visual disturbance.   Respiratory: Negative for cough, shortness of breath and wheezing.    Cardiovascular: Negative for chest pain and palpitations.   Gastrointestinal: Negative for abdominal distention, nausea and vomiting.   Genitourinary: Negative for difficulty urinating and flank pain.   Musculoskeletal: Positive for gait problem. Negative for arthralgias, back pain, myalgias and neck pain.   Skin: Negative for rash and wound.   Neurological: Positive for speech difficulty and weakness. Negative for dizziness, numbness and headaches.   Psychiatric/Behavioral: Negative for agitation and hallucinations. The patient is not nervous/anxious.      Objective:     Vital Signs (Most Recent):  Temp: 98.5 °F (36.9 °C) (06/13/18 1109)  Pulse: (!) 58 (06/13/18 1109)  Resp: 18 (06/13/18 1109)  BP: (!) 154/68 (06/13/18 1109)  SpO2: 96 % (06/13/18 1109)    Vital Signs (24h Range):  Temp:  [97.3 °F (36.3  °C)-99.4 °F (37.4 °C)] 98.5 °F (36.9 °C)  Pulse:  [58-70] 58  Resp:  [16-36] 18  SpO2:  [92 %-98 %] 96 %  BP: (132-172)/(57-96) 154/68     Physical Exam   Constitutional: She is oriented to person, place, and time. She appears well-developed and well-nourished. No distress.   HENT:   Head: Normocephalic and atraumatic.   Right Ear: External ear normal.   Left Ear: External ear normal.   Nose: Nose normal.   Eyes: Right eye exhibits no discharge. Left eye exhibits no discharge. No scleral icterus.   Neck: Normal range of motion.   Cardiovascular: Normal rate, regular rhythm and intact distal pulses.    Pulmonary/Chest: Effort normal. No respiratory distress. She has no wheezes.   Abdominal: Soft. She exhibits no distension. There is no tenderness.   Musculoskeletal: She exhibits no edema or tenderness.        Left hand: She exhibits decreased range of motion and swelling.   Neurological: She is alert and oriented to person, place, and time. She exhibits normal muscle tone.   -  Mental Status:  AAOx3.  Follows commands.  Answers correct age and .  Recent and remote memory intact.  -  Speech and language:  no aphasia, mild dysarthria.    -  Facial movement (CN VII): symmetrical.   -  Motor:  RUE: 5/5.  LUE: 4/5.  RLE: 5/5.  LLE: 4-/5.  -  Tone:  Normal.  -  Sensory:  Intact to light touch and pin prick.   Skin: Skin is warm and dry. Bruising and ecchymosis noted. No rash noted.   Psychiatric: She has a normal mood and affect. Her behavior is normal. Thought content normal.   Vitals reviewed.    Diagnostic Results:   Labs: Reviewed  X-Ray: Reviewed  CT: Reviewed  MRI: Reviewed  CTA: Reviewed    NEUROLOGICAL EXAMINATION:     MENTAL STATUS   Oriented to person, place, and time.

## 2018-06-13 NOTE — PLAN OF CARE
Problem: SLP Goal  Goal: SLP Goal  Speech Language Pathology Goals  Goals expected to be met by 6/17  1. Pt will participate in ongoing assessment of swallow. - MET 6/13 (MBSS -Puree, Nectar thick liquids)  2. Pt will complete speech, language, cognitive evaluation to determine need for tx. -MET 6/11  3. Pt will complete dysphagia therapy exercises x10 each given min cues.   4. Pt will utilize speech strategies in conversation to improve overall intelligibility.              MBSS completed this am with recs for PUREE diet and NECTAR thickened liquids. Team contacted regarding results and diet recommendations.   Ania Dave M.S., [facility]SLP  Speech Language Pathologist  [phone]  06/13/2018

## 2018-06-13 NOTE — PT/OT/SLP PROGRESS
"Physical Therapy Treatment    Patient Name:  Isa Sewell   MRN:  [id number]  Admitting Diagnosis: Embolic stroke involving right middle cerebral artery  Recent Surgery: * No surgery found *      Recommendations:     Discharge Recommendations:  rehabilitation facility   Discharge Equipment Recommendations:  (TBD)   Barriers to discharge: Decreased caregiver support    Plan:     During this hospitalization, patient to be seen 4 x/week to address the above listed problems via gait training, therapeutic activities, therapeutic exercises, neuromuscular re-education  · Plan of Care Expires:  07/10/18   Plan of Care Reviewed with: patient, family    This Plan of care has been discussed with the patient who was involved in its development and understands and is in agreement with the identified goals and treatment plan    Subjective     Communicated with RN (Mode) prior to session.     Patient comments: "I need to use the bedpan" Pt was encouraged to get OOB and amb to restroom  Pain/Comfort:  · Pain Rating 1: 0/10  · Pain Rating Post-Intervention 1: 0/10    Objective:     Patient found with: bed alarm, oxygen, telemetry    Patient found sup in bed upon PT entry to room, agreeable to treatment.  Shira Joaquin) present in the room.    General Precautions: Standard, Cardiac aspiration, fall   Orthopedic Precautions:N/A   Braces: N/A       BED MOBILITY (vc's for hand placement sequencing of task):        Rolling to the R with min A with no use of bedrail       Sup > sit at the EOB with mod A for trunk from R side lying        Sit > sup Not performed 2* pt left seated UIC        Scooting hips to the EOB upon sitting with min A x2-3 scoots          SITTING AT THE EDGE OF THE BED    Assistance Level Required: SBA for safety with R UE support and L UE in weight bearing position on mattress     Postural deviations noted: flexed trunk, PPT, rounded shoulders   Encouraged: upright posture        TRANSFERS  (vc's for hand " placement, sequencing of task and safety)   Patient completed Sit <> Stand Transfer from EOB with min A; from toilet seat with mod A for hip elevation/balance with no assistive device x3 trials   Patient completed Stand <> Sit Transfer to toilet seat/BS chair with min A for controlled descent with no assistive device       STANDING        Pt tolerates standing for pericare after BM and at sink to wash hands with no AD requiring min A for balance with vc's.  Pt demonstrates no overt LOB    GAIT:    Patient ambulated: 10-12ft x2 trials (from EOB to rest room) No further gait training performed 2* pt refused with c/o fatigue   Patient required: min A for balance and L UE   Patient used:  No Assistive Device   Gait Pattern observed: reciprocal gait   Gait Deviation(s): decreased jose, increased time in double stance, decreased velocity of limb motion, decreased step length, decreased stride length, decreased swing-to-stance ratio, decreased toe-to-floor clearance and decreased weight-shifting ability    Comments: vc's for directional guidance, B step length and safety      EDUCATION  Education provided to pt regarding: postural control and importance and benefits of performing exercises and functional mobility.    They were provided and educated on proper positioning in supine and in sitting with support of affected L UE in order to increase awareness of it and to decrease the effects of immobility, specifically edema and pain.     Whiteboard updated with correct mobility information. RN/PCT notified.  Pt safe to transfer with RN/PCT via SPT: Use no AD with min A of 1 person.    Patient left up in chair, with L UE propped on pillow for scapular support and edema management with all lines intact, call button in reach, RN notified and family present    AM-PAC 6 CLICK MOBILITY  Turning over in bed (including adjusting bedclothes, sheets and blankets)?: 3  Sitting down on and standing up from a chair with arms (e.g.,  wheelchair, bedside commode, etc.): 3  Moving from lying on back to sitting on the side of the bed?: 2  Moving to and from a bed to a chair (including a wheelchair)?: 3  Need to walk in hospital room?: 3  Climbing 3-5 steps with a railing?: 3  Total Score: 17     Assessment:     Isa Sewell is a 87 y.o. female admitted with a medical diagnosis of Embolic stroke involving right middle cerebral artery.  She presents with the following impairments/functional limitations:  weakness, impaired endurance, impaired sensation, impaired self care skills, impaired functional mobilty, gait instability, impaired balance, decreased coordination, decreased upper extremity function, decreased lower extremity function, decreased safety awareness, abnormal tone, decreased ROM, impaired coordination, impaired fine motor, impaired skin, edema. L hemiparesis requiring significant assistance and verbal cues for bed mob, transfers and gait.   In light of pt's current functional level and deficits, it is anticipated that pt will need to participate in an intense rehab program consisting of PT, OT and ST in order to achieve full rehab potential to return to previous level of function and roles.  Pt remains motivated to participate in PT session and will cont to benefit from skilled PT intervention.    Rehab Prognosis:  Good; patient would benefit from acute skilled PT services to address these deficits and reach maximum level of function.      GOALS:    Physical Therapy Goals        Problem: Physical Therapy Goal    Goal Priority Disciplines Outcome Goal Variances Interventions   Physical Therapy Goal     PT/OT, PT Ongoing (interventions implemented as appropriate)     Description:    Goals to be met by 6/22/2018    1. Pt will perform rolling to the R and L with SBA.   2. Pt will perform supine to sit from both sides of the bed with SBA.  3. Pt will perform sit to supine with SBA.  4. Pt will perform sit to stand transfers with SBA  with no AD.    5. Pt will perform bed <> chair transfers with SBA with no AD.  6. Pt will perform gait x 150 feet with SBA and no AD.                      Time Tracking:     PT Received On: 06/13/18  PT Start Time: 1433     PT Stop Time: 1500  PT Total Time (min): 27 min     Billable Minutes: Gait Training 10 and Therapeutic Activity 17    Treatment Type: Treatment  PT/PTA: PTA     PTA Visit Number: 2       Darby Fontana PTA.  Pager [phone]    6/13/2018    .

## 2018-06-13 NOTE — PROCEDURES
DATE OF PROCEDURE:  06/12/2018    ELECTROENCEPHALOGRAM REPORT  Extended Recording    METHODOLOGY:  Electroencephalographic (EEG) is recorded with electrodes placed   according to the International 10-20 placement system.  Thirty two (32) channels   of digital signal (sampling rate of 512/sec), including T1 and T2, were   simultaneously recorded from the scalp and may include EKG, EMG, and/or eye   monitors.  Recording band pass was 0.1 to 512 Hz.  Digital video recording of   the patient is simultaneously recorded with the EEG.  The patient is instructed   to report clinical symptoms which may occur during the recording session.  EEG   and video recording are stored and archived in digital format.  Activation   procedures, which include photic stimulation, hyperventilation and instructing   patients to perform simple tasks, are done in selected patients.    The EEG is displayed on a monitor screen and can be reviewed using different   montages.  Computer-assisted analysis is employed to detect spike and   electrographic seizure activity.  The entire record is submitted for computer   analysis.  The entire recording is visually reviewed, and the times identified   by computer analysis as being spikes or seizures are reviewed again.    Compressed spectral analysis (CSA) is also performed on the activity recorded   from each individual channel.  This is displayed as a power display of   frequencies from 0 to 30 Hz over time.  The CSA is reviewed looking for   asymmetries in power between homologous areas of the scalp, then compared with   the original EEG recording.    [employer] software was also utilized in the review of this study.  This software   suite analyzes the EEG recording in multiple domains.  Coherence and rhythmicity   are computed to identify EEG sections which may contain organized seizures.    Each channel undergoes analysis to detect the presence of spike and sharp waves   which have special and  morphological characteristics of epileptic activity.  The   routine EEG recording is converted from special into frequency domain.  This is   then displayed comparing homologous areas to identify areas of significant   asymmetry.  Algorithm to identify non-cortically generated artifact is used to   separate artifact from the EEG.    Duration is 4 hours and 52 minutes.    EEG FINDINGS:  This record consists of fluctuating mixed frequencies with   abundant medium amplitude fast activity seen in the alpha and beta range   initially with a somewhat disorganized appearance.  There is frequent   intermittent generalized rhythmic delta activity seen interrupting a   predominantly alpha and beta pattern.  There is a significant amount of EMG   artifact in portions of the record.  There are multiple superimposed mixed   frequencies throughout large portions of the record.    During some portions of the record, relative slowing can be seen in the right   frontotemporal region compared to the left, mostly represented by increased   delta activity in that area, which is intermittent.    The record cycles through periods of relative attenuation with predominantly   delta activity and central sleep spindle activity likely representing poorly   formed sleep architecture as well.  There are frequent portions of brief frontal   and generalized rhythmic delta activity, but no epileptiform disturbances.    INTERPRETATION:  Abnormal EEG due to intermittent rhythmic delta activity and   mild disorganization of the background suggesting a mild degree of   encephalopathy.  There is also suggestion of right hemispheric slowing, but no   evidence of seizures.      NBB/HN  dd: 06/13/2018 15:48:11 (CDT)  td: 06/13/2018 16:01:06 (CDT)  Doc ID   #[id number]  Job ID #[id number]    CC:

## 2018-06-13 NOTE — ASSESSMENT & PLAN NOTE
86 yr old female with medical history of HTN presenting with R MCA syndrome, s/p IV tpA in ED.     - CTA: Right distal M1 / Mild bilateral carotid atherosclerotic plaque with less than 50%  - MRI: Right MCA - right insular cortex and right frontal lobe / PH1   - IR thrombectomy / TICI 3   - Echo: Moderate left atrial enlargement.     - ESUS - Likely cardioembolic     - 6/12: NICU step down / Improvement in NIHSS - Left sided drift / facial droop / dysarthria +      Antithrombotics for secondary stroke prevention: Antiplatelets:  / Plavix 75      Statins for secondary stroke prevention and hyperlipidemia, if present: Statins: Atorvastatin- 40 mg daily     Aggressive risk factor modification: None     Rehab efforts: PT/OT/SLP to evaluate and treat     Diagnostics ordered/pending:  None      VTE prophylaxis: Heparin     BP parameters: Infarct: Post sucessful thrombectomy, SBP <140

## 2018-06-13 NOTE — SUBJECTIVE & OBJECTIVE
Neurologic Chief Complaint: Right embolic MCA stroke     Subjective:     Interval History: Patient is seen for follow-up neurological assessment and treatment recommendations:    NAEON. No concerns for worsening NIHSS     HPI, Past Medical, Family, and Social History remains the same as documented in the initial encounter.     Review of Systems   Constitutional: Negative for chills and fever.   HENT: Negative for trouble swallowing.    Respiratory: Negative for shortness of breath.    Cardiovascular: Negative for chest pain.   Genitourinary: Negative for dysuria.   Neurological: Positive for facial asymmetry, weakness and numbness. Negative for tremors and headaches.   Psychiatric/Behavioral: Negative for agitation, behavioral problems and confusion.     Scheduled Meds:   [START ON 6/14/2018] amLODIPine  10 mg Oral Daily    [START ON 6/14/2018] aspirin  325 mg Oral Daily    [START ON 6/14/2018] atorvastatin  40 mg Oral Daily    clobetasol 0.05%   Topical (Top) BID    clopidogrel  75 mg Oral Daily    heparin (porcine)  5,000 Units Subcutaneous Q8H    senna-docusate 8.6-50 mg  1 tablet Oral Daily    sodium chloride 0.9%  3 mL Intravenous Q8H     Continuous Infusions:  PRN Meds:dextrose 50%, glucagon (human recombinant), insulin aspart U-100    Objective:     Vital Signs (Most Recent):  Temp: 98.5 °F (36.9 °C) (06/13/18 1109)  Pulse: (!) 58 (06/13/18 1109)  Resp: 18 (06/13/18 1109)  BP: (!) 154/68 (06/13/18 1109)  SpO2: 96 % (06/13/18 1109)  BP Location: Left arm    Vital Signs Range (Last 24H):  Temp:  [97.3 °F (36.3 °C)-99.4 °F (37.4 °C)]   Pulse:  [58-70]   Resp:  [16-36]   BP: (132-164)/(57-96)   SpO2:  [92 %-98 %]   BP Location: Left arm    Physical Exam   Constitutional: No distress.   HENT:   Head: Normocephalic and atraumatic.   Eyes: EOM are normal.   Neck: Normal range of motion. Neck supple.   Cardiovascular:   No murmur heard.  Pulmonary/Chest: No respiratory distress.   Abdominal: Soft.    Musculoskeletal: She exhibits no deformity.       Neurological Exam:   LOC: alert  Attention Span: Good   Language: No aphasia  Articulation: Dysarthria  Orientation: Not oriented to time  Visual Fields: Inferior quadrantanopsia: left  EOM (CN III, IV, VI): Full/intact  Pupils (CN II, III): PERRL  Facial Sensation (CN V): Normal  Facial Movement (CN VII): Lower facial weakness on the Left  Gag Reflex: present  Reflexes: 2+ throughout  Motor: Arm left  Paresis: 4/5  Leg left  Paresis: 4/5  Arm right  Normal 5/5  Leg right Normal 5/5  Cebellar: No evidence of appendicular or axial ataxia  Sensation: Fawad-hypoesthesia left  Tone: Normal tone throughout    Laboratory:  CMP:   Recent Labs  Lab 06/13/18  0418   CALCIUM 8.9   ALBUMIN 2.5*   PROT 5.9*      K 4.0   CO2 27      BUN 18   CREATININE 0.7   ALKPHOS 72   ALT 20   AST 18   BILITOT 0.5     BMP:   Recent Labs  Lab 06/13/18  0418      K 4.0      CO2 27   BUN 18   CREATININE 0.7   CALCIUM 8.9     CBC:   Recent Labs  Lab 06/13/18  0418   WBC 10.82   RBC 3.46*   HGB 10.2*   HCT 32.4*      MCV 94   MCH 29.5   MCHC 31.5*     Lipid Panel:   Recent Labs  Lab 06/09/18  1712   CHOL 159   LDLCALC 109.4   HDL 36*   TRIG 68     Coagulation:   Recent Labs  Lab 06/09/18  1208   INR 1.0     Platelet Aggregation Study: No results for input(s): PLTAGG, PLTAGINTERP, PLTAGREGLACO, ADPPLTAGGREG in the last 168 hours.  Hgb A1C:   Recent Labs  Lab 06/09/18  1712   HGBA1C 6.2*     TSH:   Recent Labs  Lab 06/09/18  1712   TSH 1.507       Diagnostic Results     Brain Imaging     MRI 6/10   Large right MCA territory acute infarct with evidence of hemorrhagic reconversion as above.  Subtle subarachnoid hemorrhage overlying right frontal sulci.     CTH/CTA 6/9   CT head: Acute right MCA distribution infarct with associated mild localized mass effect.  No significant midline shift or acute intracranial hemorrhage.  Superimposed generalized cerebral volume loss and  chronic microvascular ischemic change.  CTA head: Abrupt termination of the distal M1 segment of the right MCA concerning for large vessel occlusion with good filling of distal branches via collateral vessels.  Mild bilateral carotid atherosclerotic plaque with less than 50% hemodynamically significant stenosis by NASCET criteria.     Cardiac Imaging   CONCLUSIONS     1 - Moderate left atrial enlargement.     2 - Normal left ventricular systolic function (EF 60-65%).     3 - No wall motion abnormalities.     4 - Normal left ventricular diastolic function.     5 - Normal right ventricular systolic function .     6 - Mild to moderate aortic stenosis, ESTELITA = 1.17 cm2, AVAi = 0.68 cm2/m2, peak velocity = 2.75 m/s, mean gradient = 17 mmHg.     7 - Mild aortic regurgitation.     8 - The estimated PA systolic pressure is 25 mmHg.     9 - Mild mitral regurgitation.

## 2018-06-14 PROBLEM — E78.5 HYPERLIPIDEMIA: Status: ACTIVE | Noted: 2018-06-14

## 2018-06-14 LAB
ALBUMIN SERPL BCP-MCNC: 2.5 G/DL
ALP SERPL-CCNC: 77 U/L
ALT SERPL W/O P-5'-P-CCNC: 16 U/L
ANION GAP SERPL CALC-SCNC: 11 MMOL/L
ANISOCYTOSIS BLD QL SMEAR: SLIGHT
AST SERPL-CCNC: 19 U/L
BASOPHILS # BLD AUTO: 0.05 K/UL
BASOPHILS NFR BLD: 0.5 %
BILIRUB SERPL-MCNC: 0.7 MG/DL
BUN SERPL-MCNC: 19 MG/DL
CALCIUM SERPL-MCNC: 8.9 MG/DL
CHLORIDE SERPL-SCNC: 106 MMOL/L
CO2 SERPL-SCNC: 25 MMOL/L
CREAT SERPL-MCNC: 0.7 MG/DL
DIFFERENTIAL METHOD: ABNORMAL
EOSINOPHIL # BLD AUTO: 0.2 K/UL
EOSINOPHIL NFR BLD: 2.1 %
ERYTHROCYTE [DISTWIDTH] IN BLOOD BY AUTOMATED COUNT: 14.1 %
EST. GFR  (AFRICAN AMERICAN): >60 ML/MIN/1.73 M^2
EST. GFR  (NON AFRICAN AMERICAN): >60 ML/MIN/1.73 M^2
GLUCOSE SERPL-MCNC: 99 MG/DL
HCT VFR BLD AUTO: 30 %
HGB BLD-MCNC: 9.7 G/DL
HYPOCHROMIA BLD QL SMEAR: ABNORMAL
IMM GRANULOCYTES # BLD AUTO: 0.11 K/UL
IMM GRANULOCYTES NFR BLD AUTO: 1.1 %
LYMPHOCYTES # BLD AUTO: 1.1 K/UL
LYMPHOCYTES NFR BLD: 11 %
MAGNESIUM SERPL-MCNC: 1.6 MG/DL
MCH RBC QN AUTO: 29.8 PG
MCHC RBC AUTO-ENTMCNC: 32.3 G/DL
MCV RBC AUTO: 92 FL
MONOCYTES # BLD AUTO: 1 K/UL
MONOCYTES NFR BLD: 9.3 %
NEUTROPHILS # BLD AUTO: 7.8 K/UL
NEUTROPHILS NFR BLD: 76 %
NRBC BLD-RTO: 0 /100 WBC
OVALOCYTES BLD QL SMEAR: ABNORMAL
PHOSPHATE SERPL-MCNC: 4.2 MG/DL
PLATELET # BLD AUTO: 165 K/UL
PMV BLD AUTO: 11.5 FL
POCT GLUCOSE: 101 MG/DL (ref 70–110)
POCT GLUCOSE: 82 MG/DL (ref 70–110)
POCT GLUCOSE: 93 MG/DL (ref 70–110)
POIKILOCYTOSIS BLD QL SMEAR: SLIGHT
POLYCHROMASIA BLD QL SMEAR: ABNORMAL
POTASSIUM SERPL-SCNC: 3.9 MMOL/L
PROT SERPL-MCNC: 6.1 G/DL
RBC # BLD AUTO: 3.26 M/UL
SODIUM SERPL-SCNC: 142 MMOL/L
URATE SERPL-MCNC: 5.3 MG/DL
WBC # BLD AUTO: 10.19 K/UL

## 2018-06-14 PROCEDURE — 85025 COMPLETE CBC W/AUTO DIFF WBC: CPT

## 2018-06-14 PROCEDURE — 36415 COLL VENOUS BLD VENIPUNCTURE: CPT

## 2018-06-14 PROCEDURE — 97535 SELF CARE MNGMENT TRAINING: CPT

## 2018-06-14 PROCEDURE — 92526 ORAL FUNCTION THERAPY: CPT

## 2018-06-14 PROCEDURE — 97112 NEUROMUSCULAR REEDUCATION: CPT

## 2018-06-14 PROCEDURE — 80053 COMPREHEN METABOLIC PANEL: CPT

## 2018-06-14 PROCEDURE — G8988 SELF CARE GOAL STATUS: HCPCS | Mod: CK

## 2018-06-14 PROCEDURE — 20600001 HC STEP DOWN PRIVATE ROOM

## 2018-06-14 PROCEDURE — 83735 ASSAY OF MAGNESIUM: CPT

## 2018-06-14 PROCEDURE — 25000003 PHARM REV CODE 250: Performed by: HOSPITALIST

## 2018-06-14 PROCEDURE — 99233 SBSQ HOSP IP/OBS HIGH 50: CPT | Mod: GC,,, | Performed by: PSYCHIATRY & NEUROLOGY

## 2018-06-14 PROCEDURE — 84550 ASSAY OF BLOOD/URIC ACID: CPT

## 2018-06-14 PROCEDURE — 25000003 PHARM REV CODE 250: Performed by: PSYCHIATRY & NEUROLOGY

## 2018-06-14 PROCEDURE — A4216 STERILE WATER/SALINE, 10 ML: HCPCS | Performed by: HOSPITALIST

## 2018-06-14 PROCEDURE — G8989 SELF CARE D/C STATUS: HCPCS | Mod: CK

## 2018-06-14 PROCEDURE — 25000003 PHARM REV CODE 250: Performed by: STUDENT IN AN ORGANIZED HEALTH CARE EDUCATION/TRAINING PROGRAM

## 2018-06-14 PROCEDURE — 84100 ASSAY OF PHOSPHORUS: CPT

## 2018-06-14 PROCEDURE — 63600175 PHARM REV CODE 636 W HCPCS: Performed by: PHYSICIAN ASSISTANT

## 2018-06-14 RX ORDER — COLCHICINE 0.6 MG/1
0.6 TABLET, FILM COATED ORAL ONCE
Status: COMPLETED | OUTPATIENT
Start: 2018-06-14 | End: 2018-06-14

## 2018-06-14 RX ORDER — COLCHICINE 0.6 MG/1
1.2 TABLET, FILM COATED ORAL ONCE
Status: COMPLETED | OUTPATIENT
Start: 2018-06-14 | End: 2018-06-14

## 2018-06-14 RX ADMIN — CLOBETASOL PROPIONATE: 0.5 OINTMENT TOPICAL at 09:06

## 2018-06-14 RX ADMIN — AMLODIPINE BESYLATE 10 MG: 10 TABLET ORAL at 09:06

## 2018-06-14 RX ADMIN — HEPARIN SODIUM 5000 UNITS: 5000 INJECTION, SOLUTION INTRAVENOUS; SUBCUTANEOUS at 03:06

## 2018-06-14 RX ADMIN — COLCHICINE 0.6 MG: 0.6 TABLET, FILM COATED ORAL at 03:06

## 2018-06-14 RX ADMIN — ASPIRIN 325 MG ORAL TABLET 325 MG: 325 PILL ORAL at 09:06

## 2018-06-14 RX ADMIN — HEPARIN SODIUM 5000 UNITS: 5000 INJECTION, SOLUTION INTRAVENOUS; SUBCUTANEOUS at 05:06

## 2018-06-14 RX ADMIN — HEPARIN SODIUM 5000 UNITS: 5000 INJECTION, SOLUTION INTRAVENOUS; SUBCUTANEOUS at 09:06

## 2018-06-14 RX ADMIN — Medication 3 ML: at 03:06

## 2018-06-14 RX ADMIN — Medication 3 ML: at 05:06

## 2018-06-14 RX ADMIN — ATORVASTATIN CALCIUM 40 MG: 20 TABLET, FILM COATED ORAL at 09:06

## 2018-06-14 RX ADMIN — COLCHICINE 1.2 MG: 0.6 TABLET, FILM COATED ORAL at 03:06

## 2018-06-14 RX ADMIN — STANDARDIZED SENNA CONCENTRATE AND DOCUSATE SODIUM 1 TABLET: 8.6; 5 TABLET, FILM COATED ORAL at 09:06

## 2018-06-14 RX ADMIN — CLOPIDOGREL 75 MG: 75 TABLET, FILM COATED ORAL at 09:06

## 2018-06-14 NOTE — PROGRESS NOTES
" [facility]  Adult Nutrition  Progress Note    SUMMARY       Recommendations    Recommendation/Intervention: Continue regular diet, texture per SLP. Encourage intake of melas and snacks. Send Boost Plus Chocolate TID. RD following  Goals: Meet % EEN, EPN  Nutrition Goal Status: new  Communication of RD Recs: reviewed with RN    Reason for Assessment    Reason for Assessment: RD follow-up  Diagnosis: stroke/CVA  Relevant Medical History: HTN  Interdisciplinary Rounds: did not attend  General Information Comments: TF stopped yesterday, diet advanced to puree; pt eating just bites of food today, family at bedside  Nutrition Discharge Planning: Unable to determine    Nutrition Risk Screen    Nutrition Risk Screen: other (see comments) (NPO)    Nutrition/Diet History    Patient Reported Diet/Restrictions/Preferences: other (see comments) (KRIS)  Do you have any cultural, spiritual, Jainism conflicts, given your current situation?: Episcopal  Factors Affecting Nutritional Intake: difficulty/impaired swallowing, decreased appetite    Anthropometrics    Temp: 97.6 °F (36.4 °C)  Height Method: Measured  Height: 5' 0.63" (154 cm)  Height (inches): 60.63 in  Weight Method: Bed Scale  Weight: 75.8 kg (167 lb 1.7 oz)  Weight (lb): 167.11 lb  Ideal Body Weight (IBW), Female: 103.15 lb  % Ideal Body Weight, Female (lb): 162.43 lb  BMI (Calculated): 32.1  BMI Grade: 30 - 34.9- obesity - grade I       Lab/Procedures/Meds    Pertinent Labs Reviewed: reviewed  Pertinent Labs Comments: A1C 6.2  Pertinent Medications Reviewed: reviewed  Pertinent Medications Comments: statin, senna, heparin    Physical Findings/Assessment    Overall Physical Appearance: overweight, nourished  Oral/Mouth Cavity: WDL  Skin: intact    Estimated/Assessed Needs    Weight Used For Calorie Calculations: 76 kg (167 lb 8.8 oz)  Energy Calorie Requirements (kcal): 1415 kcal/d  Energy Need Method: Davis-St Fabriceor (1.25 PAL)  Protein " Requirements: 69-84 g/d (.9-1.1 g/kg)  Weight Used For Protein Calculations: 76 kg (167 lb 8.8 oz)     Fluid Need Method: other (see comments) (Per MD or 1 mL/kcal)  RDA Method (mL): 1415         Nutrition Prescription Ordered    Current Diet Order: Puree    Evaluation of Received Nutrient/Fluid Intake    IV Fluid (mL): 0  Comments: LBM not recorded  % Intake of Estimated Energy Needs: 0 - 25 %  % Meal Intake: 0 - 25 %    Nutrition Risk    Level of Risk/Frequency of Follow-up: high     Assessment and Plan    Nutrition Problem  Inadequate energy intake    Related to (etiology):   Poor appetite, dysphagia    Signs and Symptoms (as evidenced by):   Pt eating < 25% of meals    Interventions/Recommendations (treatment strategy):  See recs    Nutrition Diagnosis Status:   New         Monitor and Evaluation    Food and Nutrient Intake: energy intake, food and beverage intake, enteral nutrition intake  Food and Nutrient Adminstration: diet order, enteral and parenteral nutrition administration  Physical Activity and Function: nutrition-related ADLs and IADLs  Anthropometric Measurements: weight, weight change  Biochemical Data, Medical Tests and Procedures: inflammatory profile, lipid profile, glucose/endocrine profile, gastrointestinal profile, electrolyte and renal panel  Nutrition-Focused Physical Findings: overall appearance     Nutrition Follow-Up    RD Follow-up?: Yes

## 2018-06-14 NOTE — PLAN OF CARE
Problem: SLP Goal  Goal: SLP Goal  Speech Language Pathology Goals  Goals expected to be met by 6/17  1. Pt will participate in ongoing assessment of swallow. - MET 6/13 (MBSS -Puree, Nectar thick liquids)  2. Pt will complete speech, language, cognitive evaluation to determine need for tx. -MET 6/11  3. Pt will complete dysphagia therapy exercises x10 each given min cues.   4. Pt will utilize speech strategies in conversation to improve overall intelligibility.               Pt making progress toward goals.   Ania Dave M.S., [facility]-SLP  Speech Language Pathologist  [phone]  06/14/2018

## 2018-06-14 NOTE — PROGRESS NOTES
[facility]  Vascular Neurology  Comprehensive Stroke Center  Progress Note    Assessment/Plan:     * Embolic stroke involving right middle cerebral artery    86 yr old female with medical history of HTN presenting with R MCA syndrome, s/p IV tpA in ED.     - CTA: Right distal M1 / Mild bilateral carotid atherosclerotic plaque with less than 50%  - MRI: Right MCA - right insular cortex and right frontal lobe / PH1   - IR thrombectomy / TICI 3   - Echo: Moderate left atrial enlargement.     - ESUS - Likely cardioembolic     - 6/12: NICU step down / Improvement in NIHSS - Left sided drift / facial droop / dysarthria +      Antithrombotics for secondary stroke prevention: Antiplatelets:  / Plavix 75      Statins for secondary stroke prevention and hyperlipidemia, if present: Statins: Atorvastatin- 40 mg daily     Aggressive risk factor modification: None     Rehab efforts: PT/OT/SLP to evaluate and treat / Pending rehab tomorrow      Diagnostics ordered/pending:  None      VTE prophylaxis: Heparin     BP parameters: Infarct: Post sucessful thrombectomy, SBP <140          Hyperlipidemia    -    - Maintain high potency statins         Benign essential HTN    - on amlodipine and olmesartan at home.   - SBP goal is <140.         Dysphagia    - 6/13: Cleared MBSS for puree         Cytotoxic cerebral edema    - evident on imaging         Pemphigus    - continue home medications              Patient was transferred from OSH with Right MCA stroke. She received tPA. She is going to IR for thrombectomy.   9/10 she had small area of hemorrhagic conversion. She is clinically improving. She failed her swallow eval.     6/13: Neuro ICU step down / Has extinction & mild LSW  + / SLP cleared for puree     STROKE DOCUMENTATION   Acute Stroke Times   Last Known Normal Date: 06/09/18  Symptom Onset Date: 06/09/18  Stroke Team Called Date: 06/09/18  Stroke Team Arrival Date: 06/09/18  CT Interpretation  Time: 1411  Decision to Treat Time for IR: 1430    NIH Scale:  1a. Level Of Consciousness: 0-->Alert: keenly responsive  1b. LOC Questions: 0-->Answers both questions correctly  1c. LOC Commands: 0-->Performs both tasks correctly  2. Best Gaze: 0-->Normal  3. Visual: 0-->No visual loss  4. Facial Palsy: 1-->Minor paralysis (flattened nasolabial fold, asymmetry on smiling)  5a. Motor Arm, Left: 1-->Drift: limb holds 90 (or 45) degrees, but drifts down before full 10 seconds: does not hit bed or other support  5b. Motor Arm, Right: 0-->No drift: limb holds 90 (or 45) degrees for full 10 secs  6a. Motor Leg, Left: 1-->Drift: leg falls by the end of the 5-sec period but does not hit bed  6b. Motor Leg, Right: 0-->No drift: leg holds 30 degree position for full 5 secs  7. Limb Ataxia: 0-->Absent  8. Sensory: 1-->Mild-to-moderate sensory loss: patient feels pinprick is less sharp or is dull on the affected side: or there is a loss of superficial pain with pinprick, but patient is aware of being touched  9. Best Language: 0-->No aphasia: normal  10. Dysarthria: 1-->Mild-to-moderate dysarthria: patient slurs at least some words and, at worst, can be understood with some difficulty  11. Extinction and Inattention (formerly Neglect): 1-->Visual, tactile, auditory, spatial, or personal inattention or extinction to bilateral simultaneous stimulation in one of the sensory modalities  Total (NIH Stroke Scale): 6       Modified [place]    Denzel Coma Scale:    ABCD2 Score:    [id number] Score:   HAS -BLED Score:   ICH Score:   Hunt & Morales Classification:      Hemorrhagic change of an Ischemic Stroke: Does this patient have an ischemic stroke with hemorrhagic changes? No     Neurologic Chief Complaint: Right embolic MCA stroke     Subjective:     Interval History: Patient is seen for follow-up neurological assessment and treatment recommendations:    NAEON. No concerns for worsening NIHSS.     ? Gouty great toe swelling / pain  concerns : shall consider colchicine response trial prior to Dc      HPI, Past Medical, Family, and Social History remains the same as documented in the initial encounter.     Review of Systems   Constitutional: Negative for chills and fever.   HENT: Negative for trouble swallowing.    Respiratory: Negative for shortness of breath.    Cardiovascular: Negative for chest pain.   Genitourinary: Negative for dysuria.   Neurological: Positive for facial asymmetry, weakness and numbness. Negative for tremors and headaches.   Psychiatric/Behavioral: Negative for agitation, behavioral problems and confusion.     Scheduled Meds:   amLODIPine  10 mg Oral Daily    aspirin  325 mg Oral Daily    atorvastatin  40 mg Oral Daily    clobetasol 0.05%   Topical (Top) BID    clopidogrel  75 mg Oral Daily    heparin (porcine)  5,000 Units Subcutaneous Q8H    senna-docusate 8.6-50 mg  1 tablet Oral Daily    sodium chloride 0.9%  3 mL Intravenous Q8H     Continuous Infusions:  PRN Meds:dextrose 50%, glucagon (human recombinant), insulin aspart U-100    Objective:     Vital Signs (Most Recent):  Temp: 97.6 °F (36.4 °C) (06/14/18 1535)  Pulse: 66 (06/14/18 1535)  Resp: 18 (06/14/18 1535)  BP: (!) 150/65 (06/14/18 1535)  SpO2: (!) 94 % (06/14/18 1535)  BP Location: Right arm    Vital Signs Range (Last 24H):  Temp:  [96.7 °F (35.9 °C)-98.5 °F (36.9 °C)]   Pulse:  [57-69]   Resp:  [14-20]   BP: (140-171)/(64-73)   SpO2:  [94 %-99 %]   BP Location: Right arm    Physical Exam   Constitutional: No distress.   HENT:   Head: Normocephalic and atraumatic.   Eyes: EOM are normal.   Neck: Normal range of motion. Neck supple.   Cardiovascular:   No murmur heard.  Pulmonary/Chest: No respiratory distress.   Abdominal: Soft.   Musculoskeletal: She exhibits no deformity.       Neurological Exam:   LOC: alert  Attention Span: Good   Language: No aphasia  Articulation: Dysarthria  Orientation: Not oriented to time  Visual Fields: Inferior  quadrantanopsia: left  EOM (CN III, IV, VI): Full/intact  Pupils (CN II, III): PERRL  Facial Sensation (CN V): Normal  Facial Movement (CN VII): Lower facial weakness on the Left  Gag Reflex: present  Reflexes: 2+ throughout  Motor: Arm left  Paresis: 4/5  Leg left  Paresis: 4/5  Arm right  Normal 5/5  Leg right Normal 5/5  Cebellar: No evidence of appendicular or axial ataxia  Sensation: Fawad-hypoesthesia left  Tone: Normal tone throughout    Laboratory:  CMP:     Recent Labs  Lab 06/14/18  0436   CALCIUM 8.9   ALBUMIN 2.5*   PROT 6.1      K 3.9   CO2 25      BUN 19   CREATININE 0.7   ALKPHOS 77   ALT 16   AST 19   BILITOT 0.7     BMP:     Recent Labs  Lab 06/14/18  0436      K 3.9      CO2 25   BUN 19   CREATININE 0.7   CALCIUM 8.9     CBC:     Recent Labs  Lab 06/14/18  0436   WBC 10.19   RBC 3.26*   HGB 9.7*   HCT 30.0*      MCV 92   MCH 29.8   MCHC 32.3     Lipid Panel:     Recent Labs  Lab 06/09/18  1712   CHOL 159   LDLCALC 109.4   HDL 36*   TRIG 68     Coagulation:     Recent Labs  Lab 06/09/18  1208   INR 1.0     Platelet Aggregation Study: No results for input(s): PLTAGG, PLTAGINTERP, PLTAGREGLACO, ADPPLTAGGREG in the last 168 hours.  Hgb A1C:     Recent Labs  Lab 06/09/18  1712   HGBA1C 6.2*     TSH:     Recent Labs  Lab 06/09/18  1712   TSH 1.507       Diagnostic Results     Brain Imaging     MRI 6/10   Large right MCA territory acute infarct with evidence of hemorrhagic reconversion as above.  Subtle subarachnoid hemorrhage overlying right frontal sulci.     CTH/CTA 6/9   CT head: Acute right MCA distribution infarct with associated mild localized mass effect.  No significant midline shift or acute intracranial hemorrhage.  Superimposed generalized cerebral volume loss and chronic microvascular ischemic change.  CTA head: Abrupt termination of the distal M1 segment of the right MCA concerning for large vessel occlusion with good filling of distal branches via collateral  vessels.  Mild bilateral carotid atherosclerotic plaque with less than 50% hemodynamically significant stenosis by NASCET criteria.     Cardiac Imaging   CONCLUSIONS     1 - Moderate left atrial enlargement.     2 - Normal left ventricular systolic function (EF 60-65%).     3 - No wall motion abnormalities.     4 - Normal left ventricular diastolic function.     5 - Normal right ventricular systolic function .     6 - Mild to moderate aortic stenosis, ESTELITA = 1.17 cm2, AVAi = 0.68 cm2/m2, peak velocity = 2.75 m/s, mean gradient = 17 mmHg.     7 - Mild aortic regurgitation.     8 - The estimated PA systolic pressure is 25 mmHg.     9 - Mild mitral regurgitation.       Eduardo Lo MD  Comprehensive Stroke Center  Department of Vascular Neurology   [facility]

## 2018-06-14 NOTE — PT/OT/SLP PROGRESS
"Occupational Therapy   Treatment    Name: Isa Sewell  MRN: [id number]  Admitting Diagnosis:  Embolic stroke involving right middle cerebral artery       Recommendations:     Discharge Recommendations: rehabilitation facility  Discharge Equipment Recommendations:  3-in-1 commode, bath bench  Barriers to discharge:  Inaccessible home environment, Decreased caregiver support    Subjective   Patient:  "Both of my big toes hurt.  It started a few days ago. My left hand hurts too."   Communicated with: Nurse prior to session.  Pain/Comfort:  · Pain Rating 1: 5/10  · Location 1:  (Bilateral great toes, left hand)  · Pain Addressed 1: Reposition  · Pain Rating Post-Intervention 1: 5/10    Patients cultural, spiritual, Church conflicts given the current situation: [employer]    Objective:     Patient found with: bed alarm, telemetry, peripheral IV, oxygen  Family present.  General Precautions: Standard, aspiration, fall   Orthopedic Precautions:N/A   Braces: N/A     Occupational Performance:    Bed Mobility:    · Patient completed Rolling/Turning to Left with  modified independence  · Patient completed Rolling/Turning to Right with minimum assistance  · Patient completed Scooting/Bridging with minimum assistance  · Patient completed Supine to Sit with minimum assistance  · Patient completed Sit to Supine with minimum assistance     Functional Mobility/Transfers:  · Patient completed Sit <> Stand Transfer with minimum assistance  with  no assistive device   · Patient completed Bed <> Chair Transfer using Stand Pivot technique with minimum assistance with no assistive device    Activities of Daily Living:  · Grooming: minimum assistance while standing  · UB Dressing: minimum assistance while seated EOB  · LB Dressing: moderate assistance while seated EOB    Patient left supine with all lines intact, call button in reach and bed alarm on    [facility] 6 Click:  [facility] Total Score: 15    Treatment & Education:  Patient/ Family " education provided for stroke warning signs, prevention guidelines and personal risk factors.  Patient/ Family verbalizing understanding via teach back method.   Patient education provided on role of OT and need for rehab upon discharge.  Patient education provided on hemiplegic dressing technique, left UE weight bearing / positioning, postural control, and transfers.  Left UE AAROM performed one set x 10 rep in all planes of motion.  SBA with postural control. Patient's functional status and disposition recommendation discussed with stroke team in daily rounds.  White board updated in patient's room.  OT asked if there were any other questions; patient/ family had no further questions.       Education:    Assessment:     Isa Sewell is a 87 y.o. female with a medical diagnosis of Embolic stroke involving right middle cerebral artery.  She presents with performance deficits affecting function are weakness, impaired self care skills, impaired balance, decreased coordination, decreased safety awareness, impaired endurance, impaired functional mobilty, gait instability, impaired cognition, decreased lower extremity function, decreased upper extremity function, abnormal tone, impaired fine motor, impaired coordination.  These performance deficits have resulted in activity limitations including but not limited to:  bed mobility, transfers, ascending/ descending stairs, walking short and long distances, walking around obstacles, transitional movement patterns (kneeling, bending); eating, upper body dressing, lower body dressing, brushing teeth, toileting, bathing, carrying objects, and driving.   Patient's role as wife, mother and independent caretaker for self has been affected. Patient will benefit from skilled OT services to maximize level of independence with self-care skills and functional mobility.  Will benefit from rehab.    Rehab Prognosis:  Good; patient would benefit from acute skilled OT services to  address these deficits and reach maximum level of function.       Plan:     Patient to be seen 4 x/week to address the above listed problems via self-care/home management, neuromuscular re-education, cognitive retraining, sensory integration, therapeutic activities, therapeutic exercises  · Plan of Care Expires: 07/08/18  · Plan of Care Reviewed with: patient, daughter    This Plan of care has been discussed with the patient who was involved in its development and understands and is in agreement with the identified goals and treatment plan    GOALS:    Occupational Therapy Goals        Problem: Occupational Therapy Goal    Goal Priority Disciplines Outcome Interventions   Occupational Therapy Goal     OT, PT/OT Ongoing (interventions implemented as appropriate)    Description:  Goals to be met by: 6/20/2018    Patient will increase functional independence with ADLs by performing:    UE Dressing with Contact Guard Assistance.  LE Dressing with Moderate Assistance.  Grooming while standing with Minimal Assistance.  Toileting from bedside commode with Minimal Assistance for hygiene and clothing management.   Sitting at edge of bed x 20 minutes with Stand by Assistance.  Supine to sit with Minimal Assistance.  Stand pivot transfers with Minimal Assistance.  Toilet transfer to bedside commode with Minimal Assistance.                      Time Tracking:     OT Date of Treatment: 06/14/18  OT Start Time: 1016  OT Stop Time: 1055  OT Total Time (min): 39 min    Billable Minutes:Self Care/Home Management 25  Neuromuscular Re-education 14    SUSU Palacio  6/14/2018

## 2018-06-14 NOTE — PLAN OF CARE
Problem: Occupational Therapy Goal  Goal: Occupational Therapy Goal  Goals to be met by: 6/20/2018    Patient will increase functional independence with ADLs by performing:    UE Dressing with Contact Guard Assistance.  LE Dressing with Moderate Assistance.  Grooming while standing with Minimal Assistance.  Toileting from bedside commode with Minimal Assistance for hygiene and clothing management.   Sitting at edge of bed x 20 minutes with Stand by Assistance.  Supine to sit with Minimal Assistance.  Stand pivot transfers with Minimal Assistance.  Toilet transfer to bedside commode with Minimal Assistance.     Goals remain appropriate.

## 2018-06-14 NOTE — PLAN OF CARE
Problem: Patient Care Overview  Goal: Plan of Care Review  Outcome: Ongoing (interventions implemented as appropriate)  Plan of care reviewed with pt and family at bedside.  AAOx4.  Able to voice concerns.  Daughter at bedside.  On 02 at 3lpm per nasal cannula.  VS, neuro and neurovascular checks q4.  Fall and  aspiration precautions maintained. VSS stable.  Continues on pureed diet with nectar thick liquids. Bed in lowest position.  Call light and suction equipment within easy reach.  Will continue to monitor.

## 2018-06-14 NOTE — ASSESSMENT & PLAN NOTE
86 yr old female with medical history of HTN presenting with R MCA syndrome, s/p IV tpA in ED.     - CTA: Right distal M1 / Mild bilateral carotid atherosclerotic plaque with less than 50%  - MRI: Right MCA - right insular cortex and right frontal lobe / PH1   - IR thrombectomy / TICI 3   - Echo: Moderate left atrial enlargement.     - ESUS - Likely cardioembolic     - 6/12: NICU step down / Improvement in NIHSS - Left sided drift / facial droop / dysarthria +      Antithrombotics for secondary stroke prevention: Antiplatelets:  / Plavix 75      Statins for secondary stroke prevention and hyperlipidemia, if present: Statins: Atorvastatin- 40 mg daily     Aggressive risk factor modification: None     Rehab efforts: PT/OT/SLP to evaluate and treat / Pending rehab tomorrow      Diagnostics ordered/pending:  None      VTE prophylaxis: Heparin     BP parameters: Infarct: Post sucessful thrombectomy, SBP <140

## 2018-06-14 NOTE — SUBJECTIVE & OBJECTIVE
Neurologic Chief Complaint: Right embolic MCA stroke     Subjective:     Interval History: Patient is seen for follow-up neurological assessment and treatment recommendations:    NAEON. No concerns for worsening NIHSS.     ? Gouty great toe swelling / pain concerns : shall consider colchicine response trial prior to Dc      HPI, Past Medical, Family, and Social History remains the same as documented in the initial encounter.     Review of Systems   Constitutional: Negative for chills and fever.   HENT: Negative for trouble swallowing.    Respiratory: Negative for shortness of breath.    Cardiovascular: Negative for chest pain.   Genitourinary: Negative for dysuria.   Neurological: Positive for facial asymmetry, weakness and numbness. Negative for tremors and headaches.   Psychiatric/Behavioral: Negative for agitation, behavioral problems and confusion.     Scheduled Meds:   amLODIPine  10 mg Oral Daily    aspirin  325 mg Oral Daily    atorvastatin  40 mg Oral Daily    clobetasol 0.05%   Topical (Top) BID    clopidogrel  75 mg Oral Daily    heparin (porcine)  5,000 Units Subcutaneous Q8H    senna-docusate 8.6-50 mg  1 tablet Oral Daily    sodium chloride 0.9%  3 mL Intravenous Q8H     Continuous Infusions:  PRN Meds:dextrose 50%, glucagon (human recombinant), insulin aspart U-100    Objective:     Vital Signs (Most Recent):  Temp: 97.6 °F (36.4 °C) (06/14/18 1535)  Pulse: 66 (06/14/18 1535)  Resp: 18 (06/14/18 1535)  BP: (!) 150/65 (06/14/18 1535)  SpO2: (!) 94 % (06/14/18 1535)  BP Location: Right arm    Vital Signs Range (Last 24H):  Temp:  [96.7 °F (35.9 °C)-98.5 °F (36.9 °C)]   Pulse:  [57-69]   Resp:  [14-20]   BP: (140-171)/(64-73)   SpO2:  [94 %-99 %]   BP Location: Right arm    Physical Exam   Constitutional: No distress.   HENT:   Head: Normocephalic and atraumatic.   Eyes: EOM are normal.   Neck: Normal range of motion. Neck supple.   Cardiovascular:   No murmur heard.  Pulmonary/Chest: No  respiratory distress.   Abdominal: Soft.   Musculoskeletal: She exhibits no deformity.       Neurological Exam:   LOC: alert  Attention Span: Good   Language: No aphasia  Articulation: Dysarthria  Orientation: Not oriented to time  Visual Fields: Inferior quadrantanopsia: left  EOM (CN III, IV, VI): Full/intact  Pupils (CN II, III): PERRL  Facial Sensation (CN V): Normal  Facial Movement (CN VII): Lower facial weakness on the Left  Gag Reflex: present  Reflexes: 2+ throughout  Motor: Arm left  Paresis: 4/5  Leg left  Paresis: 4/5  Arm right  Normal 5/5  Leg right Normal 5/5  Cebellar: No evidence of appendicular or axial ataxia  Sensation: Fawad-hypoesthesia left  Tone: Normal tone throughout    Laboratory:  CMP:     Recent Labs  Lab 06/14/18  0436   CALCIUM 8.9   ALBUMIN 2.5*   PROT 6.1      K 3.9   CO2 25      BUN 19   CREATININE 0.7   ALKPHOS 77   ALT 16   AST 19   BILITOT 0.7     BMP:     Recent Labs  Lab 06/14/18  0436      K 3.9      CO2 25   BUN 19   CREATININE 0.7   CALCIUM 8.9     CBC:     Recent Labs  Lab 06/14/18  0436   WBC 10.19   RBC 3.26*   HGB 9.7*   HCT 30.0*      MCV 92   MCH 29.8   MCHC 32.3     Lipid Panel:     Recent Labs  Lab 06/09/18  1712   CHOL 159   LDLCALC 109.4   HDL 36*   TRIG 68     Coagulation:     Recent Labs  Lab 06/09/18  1208   INR 1.0     Platelet Aggregation Study: No results for input(s): PLTAGG, PLTAGINTERP, PLTAGREGLACO, ADPPLTAGGREG in the last 168 hours.  Hgb A1C:     Recent Labs  Lab 06/09/18  1712   HGBA1C 6.2*     TSH:     Recent Labs  Lab 06/09/18  1712   TSH 1.507       Diagnostic Results     Brain Imaging     MRI 6/10   Large right MCA territory acute infarct with evidence of hemorrhagic reconversion as above.  Subtle subarachnoid hemorrhage overlying right frontal sulci.     CTH/CTA 6/9   CT head: Acute right MCA distribution infarct with associated mild localized mass effect.  No significant midline shift or acute intracranial  hemorrhage.  Superimposed generalized cerebral volume loss and chronic microvascular ischemic change.  CTA head: Abrupt termination of the distal M1 segment of the right MCA concerning for large vessel occlusion with good filling of distal branches via collateral vessels.  Mild bilateral carotid atherosclerotic plaque with less than 50% hemodynamically significant stenosis by NASCET criteria.     Cardiac Imaging   CONCLUSIONS     1 - Moderate left atrial enlargement.     2 - Normal left ventricular systolic function (EF 60-65%).     3 - No wall motion abnormalities.     4 - Normal left ventricular diastolic function.     5 - Normal right ventricular systolic function .     6 - Mild to moderate aortic stenosis, ESTELITA = 1.17 cm2, AVAi = 0.68 cm2/m2, peak velocity = 2.75 m/s, mean gradient = 17 mmHg.     7 - Mild aortic regurgitation.     8 - The estimated PA systolic pressure is 25 mmHg.     9 - Mild mitral regurgitation.

## 2018-06-14 NOTE — PT/OT/SLP PROGRESS
Speech Language Pathology Treatment    Patient Name:  Isa Sewell   MRN:  [id number]  Admitting Diagnosis: Embolic stroke involving right middle cerebral artery    Recommendations:                 General Recommendations:  Dysphagia therapy and Speech/language therapy  Diet recommendations:  Puree, Liquid Diet Level: Nectar Thick   Pt may have single ice chips at a time between meals.   Aspiration Precautions:  · No ice in drinks. No jello/ice cream/ popsicles etc.   · 6oz liquid to 1  thickener.   · 1 bite/sip at a time  · Alternating bites/sips  · Assistance with thickening liquids  · HOB to 90 degrees  · Meds crushed in puree  · Remain upright 30 minutes post meal  · Small bites/sips   · Strict aspiration precautions   General Precautions: Standard, aspiration, fall  Communication strategies:  none    Subjective     Pt awake and alert. Breakfast tray present.   Daughter present.     Pain/Comfort:  · Pain Rating 1: 0/10  · Pain Rating Post-Intervention 2: 0/10    Objective:     Has the patient been evaluated by SLP for swallowing?   Yes  Keep patient NPO? No   Current Respiratory Status: nasal cannula      Nectar thick liquids via cup sips x4 along with puree trial x2 tolerated well with no overt signs of airway compromise. Dysphagia exercises (forceful /k/ & /g/, Eden maneuver, effortful swallow, chin tuck with resistance) completed x5 each with supervision. Pt utilized personal compact mirror for visual reinforcement - Pt reported mirror beneficial in performance of exercises.  Ice chips for oral moisture during dysphagia exercises beneficial and also tolerated well with no signs of airway compromise. ST provided demonstration to Pt and family member (daughter) for reaching appropriate nectar thickened liquid consistency. S/p demo and education, Pt and family member with 100% recall.   Skilled education also provided re:  POC, puree diet example items, prognosis given further participation in  dysphagia exercises, rationale for each exercise, diet recs, and aspiration precautions. Pt and family member verbalized understanding. Handout of dysphagia exercises from previous ST session remains with Pt.   Whiteboard updated.  Cont ST POC.     Assessment:     Isa Sewell is a 87 y.o. female with an SLP diagnosis of Dysphagia and Dysarthria.      Goals:    SLP Goals        Problem: SLP Goal    Goal Priority Disciplines Outcome   SLP Goal     SLP Ongoing (interventions implemented as appropriate)   Description:  Speech Language Pathology Goals  Goals expected to be met by 6/17  1. Pt will participate in ongoing assessment of swallow. - MET 6/13 (MBSS -Puree, Nectar thick liquids)  2. Pt will complete speech, language, cognitive evaluation to determine need for tx. -MET 6/11  3. Pt will complete dysphagia therapy exercises x10 each given min cues.   4. Pt will utilize speech strategies in conversation to improve overall intelligibility.                              Plan:     · Patient to be seen:  5 x/week   · Plan of Care expires:  07/10/18  · Plan of Care reviewed with:  patient, daughter   · SLP Follow-Up:  Yes       Discharge recommendations:  rehabilitation facility       Time Tracking:     SLP Treatment Date:   06/14/18  Speech Start Time:  0818  Speech Stop Time:  0836     Speech Total Time (min):  18 min    Billable Minutes: Treatment Swallowing Dysfunction 10 and Seld Care/Home Management Training 8    Ania Dave M.S., [facility]-SLP  Speech Language Pathologist  [phone]  06/14/2018

## 2018-06-15 VITALS
SYSTOLIC BLOOD PRESSURE: 136 MMHG | RESPIRATION RATE: 17 BRPM | TEMPERATURE: 99 F | BODY MASS INDEX: 31.55 KG/M2 | OXYGEN SATURATION: 95 % | HEART RATE: 60 BPM | HEIGHT: 61 IN | DIASTOLIC BLOOD PRESSURE: 65 MMHG | WEIGHT: 167.13 LBS

## 2018-06-15 PROBLEM — Z92.82 TISSUE PLASMINOGEN ACTIVATOR (T-PA) ADMINISTERED AT OTHER FACILITY WITHIN 24 HOURS PRIOR TO CURRENT ADMISSION: Status: ACTIVE | Noted: 2018-06-15

## 2018-06-15 LAB
ALBUMIN SERPL BCP-MCNC: 2.5 G/DL
ALP SERPL-CCNC: 84 U/L
ALT SERPL W/O P-5'-P-CCNC: 18 U/L
ANION GAP SERPL CALC-SCNC: 12 MMOL/L
AST SERPL-CCNC: 21 U/L
BASOPHILS # BLD AUTO: 0.04 K/UL
BASOPHILS NFR BLD: 0.4 %
BILIRUB SERPL-MCNC: 0.7 MG/DL
BUN SERPL-MCNC: 21 MG/DL
CALCIUM SERPL-MCNC: 9 MG/DL
CHLORIDE SERPL-SCNC: 103 MMOL/L
CO2 SERPL-SCNC: 25 MMOL/L
CREAT SERPL-MCNC: 0.8 MG/DL
DIFFERENTIAL METHOD: ABNORMAL
EOSINOPHIL # BLD AUTO: 0 K/UL
EOSINOPHIL NFR BLD: 0.4 %
ERYTHROCYTE [DISTWIDTH] IN BLOOD BY AUTOMATED COUNT: 14 %
EST. GFR  (AFRICAN AMERICAN): >60 ML/MIN/1.73 M^2
EST. GFR  (NON AFRICAN AMERICAN): >60 ML/MIN/1.73 M^2
GLUCOSE SERPL-MCNC: 112 MG/DL
HCT VFR BLD AUTO: 32.1 %
HGB BLD-MCNC: 10.4 G/DL
IMM GRANULOCYTES # BLD AUTO: 0.14 K/UL
IMM GRANULOCYTES NFR BLD AUTO: 1.6 %
LYMPHOCYTES # BLD AUTO: 0.8 K/UL
LYMPHOCYTES NFR BLD: 8.5 %
MAGNESIUM SERPL-MCNC: 1.9 MG/DL
MCH RBC QN AUTO: 29.4 PG
MCHC RBC AUTO-ENTMCNC: 32.4 G/DL
MCV RBC AUTO: 91 FL
MONOCYTES # BLD AUTO: 0.8 K/UL
MONOCYTES NFR BLD: 9.2 %
NEUTROPHILS # BLD AUTO: 7.1 K/UL
NEUTROPHILS NFR BLD: 79.9 %
NRBC BLD-RTO: 0 /100 WBC
PHOSPHATE SERPL-MCNC: 3.7 MG/DL
PLATELET # BLD AUTO: 248 K/UL
PMV BLD AUTO: 10.9 FL
POTASSIUM SERPL-SCNC: 4 MMOL/L
PROT SERPL-MCNC: 6.5 G/DL
RBC # BLD AUTO: 3.54 M/UL
SODIUM SERPL-SCNC: 140 MMOL/L
WBC # BLD AUTO: 8.92 K/UL

## 2018-06-15 PROCEDURE — 25000003 PHARM REV CODE 250: Performed by: PSYCHIATRY & NEUROLOGY

## 2018-06-15 PROCEDURE — 85025 COMPLETE CBC W/AUTO DIFF WBC: CPT

## 2018-06-15 PROCEDURE — 97530 THERAPEUTIC ACTIVITIES: CPT

## 2018-06-15 PROCEDURE — 80053 COMPREHEN METABOLIC PANEL: CPT

## 2018-06-15 PROCEDURE — 25000003 PHARM REV CODE 250: Performed by: STUDENT IN AN ORGANIZED HEALTH CARE EDUCATION/TRAINING PROGRAM

## 2018-06-15 PROCEDURE — 63600175 PHARM REV CODE 636 W HCPCS: Performed by: PHYSICIAN ASSISTANT

## 2018-06-15 PROCEDURE — 99233 SBSQ HOSP IP/OBS HIGH 50: CPT | Mod: GC,,, | Performed by: PSYCHIATRY & NEUROLOGY

## 2018-06-15 PROCEDURE — 36415 COLL VENOUS BLD VENIPUNCTURE: CPT

## 2018-06-15 PROCEDURE — 97116 GAIT TRAINING THERAPY: CPT

## 2018-06-15 PROCEDURE — 83735 ASSAY OF MAGNESIUM: CPT

## 2018-06-15 PROCEDURE — 84100 ASSAY OF PHOSPHORUS: CPT

## 2018-06-15 RX ORDER — ATORVASTATIN CALCIUM 40 MG/1
40 TABLET, FILM COATED ORAL DAILY
Qty: 90 TABLET | Refills: 3 | Status: SHIPPED | OUTPATIENT
Start: 2018-06-16 | End: 2019-09-16 | Stop reason: SDUPTHER

## 2018-06-15 RX ORDER — OLMESARTAN MEDOXOMIL 40 MG/1
40 TABLET ORAL DAILY
Qty: 90 TABLET | Refills: 3 | Status: SHIPPED | OUTPATIENT
Start: 2018-06-15 | End: 2019-11-18 | Stop reason: SDUPTHER

## 2018-06-15 RX ORDER — CLOPIDOGREL BISULFATE 75 MG/1
75 TABLET ORAL DAILY
Qty: 30 TABLET | Refills: 11 | Status: SHIPPED | OUTPATIENT
Start: 2018-06-16 | End: 2019-11-13 | Stop reason: SDUPTHER

## 2018-06-15 RX ORDER — ONDANSETRON 8 MG/1
8 TABLET, ORALLY DISINTEGRATING ORAL ONCE
Status: DISCONTINUED | OUTPATIENT
Start: 2018-06-15 | End: 2018-06-15 | Stop reason: HOSPADM

## 2018-06-15 RX ORDER — ASPIRIN 325 MG
325 TABLET ORAL DAILY
Refills: 0 | COMMUNITY
Start: 2018-06-17 | End: 2018-07-16 | Stop reason: ALTCHOICE

## 2018-06-15 RX ADMIN — ASPIRIN 325 MG ORAL TABLET 325 MG: 325 PILL ORAL at 10:06

## 2018-06-15 RX ADMIN — STANDARDIZED SENNA CONCENTRATE AND DOCUSATE SODIUM 1 TABLET: 8.6; 5 TABLET, FILM COATED ORAL at 10:06

## 2018-06-15 RX ADMIN — ATORVASTATIN CALCIUM 40 MG: 20 TABLET, FILM COATED ORAL at 10:06

## 2018-06-15 RX ADMIN — AMLODIPINE BESYLATE 10 MG: 10 TABLET ORAL at 10:06

## 2018-06-15 RX ADMIN — CLOPIDOGREL 75 MG: 75 TABLET, FILM COATED ORAL at 10:06

## 2018-06-15 RX ADMIN — CLOBETASOL PROPIONATE: 0.5 OINTMENT TOPICAL at 10:06

## 2018-06-15 RX ADMIN — HEPARIN SODIUM 5000 UNITS: 5000 INJECTION, SOLUTION INTRAVENOUS; SUBCUTANEOUS at 05:06

## 2018-06-15 NOTE — ASSESSMENT & PLAN NOTE
- ? Gouty / prior + h/o   - Xray negative / uric acid negative elevation     - colchicine trial + for sym alleviation     - op rheum follow-up   - avoid thiazide

## 2018-06-15 NOTE — DISCHARGE SUMMARY
[facility]  Vascular Neurology  Comprehensive Stroke Center  Discharge Summary     Summary:     Admit Date: 6/9/2018  1:57 PM    Discharge Date and Time:  06/15/2018 5:26 PM    Attending Physician: JERONIMO MELISSA MD    Discharge Provider: Eduardo Lo MD    History of Present Illness: 86 year old female with HTN on amlodipine and olmesartan. She was transferred for stroke s/p TPA.   Patient was driving her car when she started to be symptomatic with left sided weakness at 11:30am. She was taken to OSH where she was diagnosed with Right MCA stroke as she had dense MCA sign in CTH. tPA was given at 12:45pm which she finished en route just before arrival to [facility] at 1:53 p.m.    Since then, she had minimal improvement of her symptoms. Patient only complaint at this time is nausea.  Family reports they noticed left facial droop, and inability to move left arm and leg. No history of CVA in the past.    Hospital Course (synopsis of major diagnoses, care, treatment, and services provided during the course of the hospital stay): Patient was transferred from OSH with Right MCA stroke. She received tPA. She is going to IR for thrombectomy.   6/10 she had small area of hemorrhagic conversion. She is clinically improving. She failed her swallow eval.   6/13: Neuro ICU step down / Has extinction & mild LSW  + / SLP cleared for puree   6/14-15: No concerns for worsening NIHSS / Great toe swelling - Xray negative, colchicine trial effective / Plans for rehab dispo with op rheum / vascular neurology f/u     STROKE DOCUMENTATION   Acute Stroke Times   Last Known Normal Date: 06/09/18  Symptom Onset Date: 06/09/18  Stroke Team Called Date: 06/09/18  Stroke Team Arrival Date: 06/09/18  CT Interpretation Time: 1411  Decision to Treat Time for IR: 1430     NIH Scale:  Interval: 7 days or at discharge (whichever comes first)  1a. Level Of Consciousness: 0-->Alert: keenly responsive  1b. LOC Questions:  0-->Answers both questions correctly  1c. LOC Commands: 0-->Performs both tasks correctly  2. Best Gaze: 0-->Normal  3. Visual: 0-->No visual loss  4. Facial Palsy: 1-->Minor paralysis (flattened nasolabial fold, asymmetry on smiling)  5a. Motor Arm, Left: 1-->Drift: limb holds 90 (or 45) degrees, but drifts down before full 10 seconds: does not hit bed or other support  5b. Motor Arm, Right: 0-->No drift: limb holds 90 (or 45) degrees for full 10 secs  6a. Motor Leg, Left: 1-->Drift: leg falls by the end of the 5-sec period but does not hit bed  6b. Motor Leg, Right: 0-->No drift: leg holds 30 degree position for full 5 secs  7. Limb Ataxia: 0-->Absent  8. Sensory: 1-->Mild-to-moderate sensory loss: patient feels pinprick is less sharp or is dull on the affected side: or there is a loss of superficial pain with pinprick, but patient is aware of being touched  9. Best Language: 0-->No aphasia: normal  10. Dysarthria: 1-->Mild-to-moderate dysarthria: patient slurs at least some words and, at worst, can be understood with some difficulty  11. Extinction and Inattention (formerly Neglect): 1-->Visual, tactile, auditory, spatial, or personal inattention or extinction to bilateral simultaneous stimulation in one of the sensory modalities  Total (NIH Stroke Scale): 6        Modified [place] Score: 4  Denzel Coma Scale:    ABCD2 Score:    [id number] Score:   HAS -BLED Score:   ICH Score:   Hunt & Morales Classification:       Assessment/Plan:     Diagnostic Results:      Brain Imaging:   MRI 6/10   Large right MCA territory acute infarct with evidence of hemorrhagic reconversion as above.  Subtle subarachnoid hemorrhage overlying right frontal sulci.     CTH/CTA 6/9   CT head: Acute right MCA distribution infarct with associated mild localized mass effect.  No significant midline shift or acute intracranial hemorrhage.  Superimposed generalized cerebral volume loss and chronic microvascular ischemic change.  CTA head:  Abrupt termination of the distal M1 segment of the right MCA concerning for large vessel occlusion with good filling of distal branches via collateral vessels.  Mild bilateral carotid atherosclerotic plaque with less than 50% hemodynamically significant stenosis by NASCET criteria.     Cardiac Imaging   CONCLUSIONS     1 - Moderate left atrial enlargement.     2 - Normal left ventricular systolic function (EF 60-65%).     3 - No wall motion abnormalities.     4 - Normal left ventricular diastolic function.     5 - Normal right ventricular systolic function .     6 - Mild to moderate aortic stenosis, ESTELITA = 1.17 cm2, AVAi = 0.68 cm2/m2, peak velocity = 2.75 m/s, mean gradient = 17 mmHg.     7 - Mild aortic regurgitation.     8 - The estimated PA systolic pressure is 25 mmHg.     9 - Mild mitral regurgitation.     Interventions: IV t-PA, Thrombectomy    Complications: None    Disposition: Rehab Facility    Final Active Diagnoses:    Diagnosis Date Noted POA    PRINCIPAL PROBLEM:  Embolic stroke involving right middle cerebral artery [I63.411] 06/09/2018 Yes    Hyperlipidemia [E78.5] 06/14/2018 Yes    Benign essential HTN [I10] 06/09/2018 Yes    Dysphagia [R13.10] 06/12/2018 Yes    Cytotoxic cerebral edema [G93.6] 06/11/2018 Yes    Pain in toe of left foot [M79.675] 04/12/2016 Yes    Pemphigus [L10.9] 04/04/2016 Yes      Problems Resolved During this Admission:    Diagnosis Date Noted Date Resolved POA     * Embolic stroke involving right middle cerebral artery    86 yr old female with medical history of HTN presenting with R MCA syndrome, s/p IV tpA in ED.     - CTA: Right distal M1 / Mild bilateral carotid atherosclerotic plaque with less than 50%  - MRI: Right MCA - right insular cortex and right frontal lobe / PH1   - IR thrombectomy / TICI 3   - Echo: Moderate left atrial enlargement.     - ESUS - Likely cardioembolic     - 6/12: NICU step down / Improvement in NIHSS - Left sided drift / facial droop /  dysarthria +      Antithrombotics for secondary stroke prevention: Antiplatelets:  / Plavix 75      Statins for secondary stroke prevention and hyperlipidemia, if present: Statins: Atorvastatin- 40 mg daily     Aggressive risk factor modification: None     Rehab efforts: PT/OT/SLP to evaluate and treat / rehab today      Diagnostics ordered/pendin day event monitor       VTE prophylaxis: Heparin     BP parameters: Infarct: Post sucessful thrombectomy, SBP <140          Hyperlipidemia    -    - Maintain high potency statins         Benign essential HTN    - on amlodipine and olmesartan at home.   - SBP goal is <140.   - avoid thiazide for ? Gout         Tissue plasminogen activator (t-PA) administered at other facility within 24 hours prior to current admission    - no immediate post t-PA complications         Dysphagia    - : Cleared MBSS for puree         Cytotoxic cerebral edema    - evident on imaging         Pain in toe of left foot    - ? Gouty / prior + h/o   - Xray negative / uric acid negative elevation     - colchicine trial + for sym alleviation     - op rheum follow-up   - avoid thiazide         Pemphigus    - continue home medications             Recommendations:     Post-discharge complication risks: Falls    Stroke Education given to: patient and family    Follow-up in Stroke Clinic in 4-6 WEEKS.     Discharge Plan:  Antithrombotics: Aspirin 325mg, Clopidogrel 75mg  Statin: Atorvastatin 40mg  Aggresive risk factor modification:  Hypertension  High Cholesterol    Follow Up:  Follow-up Information     [facility] VASCULAR NEUROLOGY In 6 weeks.    Specialty:  Vascular Neurology  Contact information:  [address]  [facility] [id number] [phone]           [facility] RHEUMATOLOGY In 6 weeks.    Specialty:  Rheumatology  Why:  ? gout concerns / B/L great toe pain   Contact information:  [address]  [facility] [id number] [phone]           Josh Oconnor  MD.    Specialty:  Internal Medicine  Why:  As needed, If symptoms worsen  Contact information:  [address] [id number] [phone]                   Patient Instructions:     Notify your health care provider if you experience any of the following:  increased confusion or weakness     Notify your health care provider if you experience any of the following:  persistent dizziness, light-headedness, or visual disturbances     Notify your health care provider if you experience any of the following:  difficulty breathing or increased cough     Notify your health care provider if you experience any of the following:  redness, tenderness, or signs of infection (pain, swelling, redness, odor or green/yellow discharge around incision site)     Notify your health care provider if you experience any of the following:  persistent nausea and vomiting or diarrhea     Notify your health care provider if you experience any of the following:  temperature >100.4         Medications:  Reconciled Home Medications:      Medication List      START taking these medications    aspirin 325 MG tablet  Take 1 tablet (325 mg total) by mouth once daily.  Start taking on:  6/17/2018     atorvastatin 40 MG tablet  Commonly known as:  LIPITOR  Take 1 tablet (40 mg total) by mouth once daily.  Start taking on:  6/16/2018     clopidogrel 75 mg tablet  Commonly known as:  PLAVIX  Take 1 tablet (75 mg total) by mouth once daily.  Start taking on:  6/16/2018     olmesartan 40 MG tablet  Commonly known as:  BENICAR  Take 1 tablet (40 mg total) by mouth once daily.        CONTINUE taking these medications    amLODIPine 5 MG tablet  Commonly known as:  NORVASC  Take 5 mg by mouth once daily.     clobetasol 0.05% 0.05 % Oint  Commonly known as:  TEMOVATE  APPLY TO AFFECTED AREA TWICE DAILY. AVOID FACE, ARMPITS, AND GROIN.     hydrOXYzine HCl 25 MG tablet  Commonly known as:  ATARAX  TAKE ONE TABLET BY MOUTH AT NIGHT AS NEEDED FOR  ITCHING.     levocetirizine 5 MG tablet  Commonly known as:  XYZAL  Take 5 mg by mouth every evening.     triamcinolone acetonide 0.1% 0.1 % cream  Commonly known as:  KENALOG  APPLY TO THE AFFECTED AREA TWICE DAILY.        STOP taking these medications    olmesartan-hydrochlorothiazide 40-25 mg per tablet  Commonly known as:  BENICAR HCT            Eduardo Lo MD  Comprehensive Stroke Center  Department of Vascular Neurology   [facility]

## 2018-06-15 NOTE — ASSESSMENT & PLAN NOTE
86 yr old female with medical history of HTN presenting with R MCA syndrome, s/p IV tpA in ED.     - CTA: Right distal M1 / Mild bilateral carotid atherosclerotic plaque with less than 50%  - MRI: Right MCA - right insular cortex and right frontal lobe / PH1   - IR thrombectomy / TICI 3   - Echo: Moderate left atrial enlargement.     - ESUS - Likely cardioembolic     - : NICU step down / Improvement in NIHSS - Left sided drift / facial droop / dysarthria +      Antithrombotics for secondary stroke prevention: Antiplatelets:  / Plavix 75      Statins for secondary stroke prevention and hyperlipidemia, if present: Statins: Atorvastatin- 40 mg daily     Aggressive risk factor modification: None     Rehab efforts: PT/OT/SLP to evaluate and treat / rehab today      Diagnostics ordered/pendin day event monitor       VTE prophylaxis: Heparin     BP parameters: Infarct: Post sucessful thrombectomy, SBP <140

## 2018-06-15 NOTE — PT/OT/SLP PROGRESS
"Physical Therapy Treatment    Patient Name:  Isa Sewell   MRN:  [id number]  Admitting Diagnosis: Embolic stroke involving right middle cerebral artery  Recent Surgery: * No surgery found *      Recommendations:     Discharge Recommendations:  rehabilitation facility   Discharge Equipment Recommendations:  (TBD)   Barriers to discharge: Decreased caregiver support    Plan:     During this hospitalization, patient to be seen 4 x/week to address the above listed problems via gait training, therapeutic activities, therapeutic exercises, neuromuscular re-education  · Plan of Care Expires:  07/10/18   Plan of Care Reviewed with: patient, spouse, daughter    This Plan of care has been discussed with the patient who was involved in its development and understands and is in agreement with the identified goals and treatment plan    Subjective     Communicated with RN (Alice) prior to session.     Patient comments: "I need to go to the restroom"  Pain/Comfort:  · Pain Rating 1: 0/10  · Pain Rating Post-Intervention 1: 0/10    Objective:     Patient found with: bed alarm, telemetry, oxygen    Patient found sup in bed upon PT entry to room, agreeable to treatment.  Family present in the room.    General Precautions: Standard, Cardiac aspiration, fall   Orthopedic Precautions:N/A   Braces: N/A       BED MOBILITY (vc's for hand placement sequencing of task):        Rolling to the R with mod/min A with no use of bedrail       Sup > sit at the EOB with mod A from R side lying        Sit > sup Not performed 2* pt left seated UIC        Scooting hips to the EOB upon sitting with min A x2 scoots         SITTING AT THE EDGE OF THE BED    Assistance Level Required: close sup for balance with B UE support      TRANSFERS  (vc's for hand placement, sequencing of task and safety)   Patient completed Sit <> Stand Transfer from EOB with min A for L UE; from toilet seat with mod A for hip elevation, balance and L UE with no assistive " device xmultiple trials   Patient completed Stand <> Sit Transfer to toilet seat/BS chair with min A for controlled descent with no assistive device       STANDING        Pt tolerates standing at sink with no AD requiring CGA for balance with vc's.       GAIT:    Patient ambulated: 10-12ft x2 trials   Patient required: min/mod A for balance and L UE   Patient used:  no AD/L HHA   Gait Pattern observed: reciprocal gait   Gait Deviation(s): decreased jose, increased time in double stance, decreased velocity of limb motion, decreased step length, decreased stride length, decreased swing-to-stance ratio, decreased toe-to-floor clearance, decreased weight-shifting ability and L Lateral lean    Comments: vc's for directional guidance, jose, step length and safety    Pt requires assistance donning underwear and clothes    EDUCATION  Education provided to pt regarding: postural control, weight shift, safety.    They were provided and educated on proper positioning in supine and in sitting with support of affected L UE in order to increase awareness of it and to decrease the effects of immobility, specifically edema and pain.     Whiteboard updated with correct mobility information. RN/PCT notified.  Pt safe to transfer with RN/PCT/family via SPT: Use no AD with min A of 1 person.    Patient left up in chair, with L UE propped on pillow for scapular support and edema management with all lines intact, call button in reach, RN notified and family present    AM-PAC 6 CLICK MOBILITY  Turning over in bed (including adjusting bedclothes, sheets and blankets)?: 2  Sitting down on and standing up from a chair with arms (e.g., wheelchair, bedside commode, etc.): 2  Moving from lying on back to sitting on the side of the bed?: 3  Moving to and from a bed to a chair (including a wheelchair)?: 2  Need to walk in hospital room?: 2  Climbing 3-5 steps with a railing?: 2  Total Score: 13     Assessment:     Isa Sewell is a 87  y.o. female admitted with a medical diagnosis of Embolic stroke involving right middle cerebral artery.  She presents with the following impairments/functional limitations:  weakness, impaired endurance, impaired sensation, impaired self care skills, impaired functional mobilty, gait instability, impaired balance, impaired cognition, decreased coordination, decreased upper extremity function, decreased lower extremity function, decreased safety awareness, abnormal tone, impaired coordination, impaired fine motor. requiring significant assistance and verbal cues for bed mob, transfers and gait to prevent falls during OOB mobility 2* weakness, decreased balance, safety.   In light of pt's current functional level and deficits, it is anticipated that pt will need to participate in an intense rehab program consisting of PT and OT in order to achieve full rehab potential to return to previous level of function and roles.      Rehab Prognosis:  Good; patient would benefit from acute skilled PT services to address these deficits and reach maximum level of function.      GOALS:    Physical Therapy Goals        Problem: Physical Therapy Goal    Goal Priority Disciplines Outcome Goal Variances Interventions   Physical Therapy Goal     PT/OT, PT Ongoing (interventions implemented as appropriate)     Description:    Goals to be met by 6/22/2018    1. Pt will perform rolling to the R and L with SBA.   2. Pt will perform supine to sit from both sides of the bed with SBA.  3. Pt will perform sit to supine with SBA.  4. Pt will perform sit to stand transfers with SBA with no AD.    5. Pt will perform bed <> chair transfers with SBA with no AD.  6. Pt will perform gait x 150 feet with SBA and no AD.                      Time Tracking:     PT Received On: 06/15/18  PT Start Time: 1111     PT Stop Time: 1146  PT Total Time (min): 35 min     Billable Minutes: Gait Training 15 and Therapeutic Activity 20    Treatment Type:  Treatment  PT/PTA: PTA     PTA Visit Number: 3       Darby Fontana PTA.  Pager [phone]    6/15/2018    .

## 2018-06-15 NOTE — PLAN OF CARE
06/15/18 1158   Final Note   Assessment Type Final Discharge Note   Discharge Disposition Rehab     The patient is discharged to [facility] today. Sw to arrange transportation to the facility. Cm called the patient's  and informed him of the discharge today to [facility]. Mr. Sewell was in agreement with discharge today.    [facility] called [facility] to find out what time the transport for the patient was coming to  the patient. Power was notified by Angelica that the  is dropping off a patient at [facility] and will be coming right back to  Mrs. Sewell.

## 2018-06-15 NOTE — SUBJECTIVE & OBJECTIVE
Neurologic Chief Complaint: Right embolic MCA stroke     Subjective:     Interval History: Patient is seen for follow-up neurological assessment and treatment recommendations:    NAEON. No concerns for worsening NIHSS.     ? Gouty great toe swelling / pain concerns : colchicine response effective. Plans for op rheum eval       HPI, Past Medical, Family, and Social History remains the same as documented in the initial encounter.     Review of Systems   Constitutional: Negative for chills and fever.   HENT: Negative for trouble swallowing.    Respiratory: Negative for shortness of breath.    Cardiovascular: Negative for chest pain.   Genitourinary: Negative for dysuria.   Neurological: Positive for facial asymmetry, weakness and numbness. Negative for tremors and headaches.   Psychiatric/Behavioral: Negative for agitation, behavioral problems and confusion.     Scheduled Meds:    Continuous Infusions:  PRN Meds:    Objective:     Vital Signs (Most Recent):  Temp: 98.8 °F (37.1 °C) (06/15/18 1202)  Pulse: 60 (06/15/18 1202)  Resp: 17 (06/15/18 1202)  BP: 136/65 (06/15/18 1202)  SpO2: 95 % (06/15/18 1202)  BP Location: Right arm    Vital Signs Range (Last 24H):  Temp:  [98.2 °F (36.8 °C)-98.8 °F (37.1 °C)]   Pulse:  [60-88]   Resp:  [12-20]   BP: (136-144)/(60-65)   SpO2:  [94 %-99 %]   BP Location: Right arm    Physical Exam   Constitutional: No distress.   HENT:   Head: Normocephalic and atraumatic.   Eyes: EOM are normal.   Neck: Normal range of motion. Neck supple.   Cardiovascular:   No murmur heard.  Pulmonary/Chest: No respiratory distress.   Abdominal: Soft.   Musculoskeletal: She exhibits no deformity.       Neurological Exam:   LOC: alert  Attention Span: Good   Language: No aphasia  Articulation: Dysarthria  Orientation: Not oriented to time  Visual Fields: Inferior quadrantanopsia: left  EOM (CN III, IV, VI): Full/intact  Pupils (CN II, III): PERRL  Facial Sensation (CN V): Normal  Facial Movement (CN VII):  Lower facial weakness on the Left  Gag Reflex: present  Reflexes: 2+ throughout  Motor: Arm left  Paresis: 4/5  Leg left  Paresis: 4/5  Arm right  Normal 5/5  Leg right Normal 5/5  Cebellar: No evidence of appendicular or axial ataxia  Sensation: Fawad-hypoesthesia left  Tone: Normal tone throughout    Laboratory:  CMP:     Recent Labs  Lab 06/15/18  0448   CALCIUM 9.0   ALBUMIN 2.5*   PROT 6.5      K 4.0   CO2 25      BUN 21   CREATININE 0.8   ALKPHOS 84   ALT 18   AST 21   BILITOT 0.7     BMP:     Recent Labs  Lab 06/15/18  0448      K 4.0      CO2 25   BUN 21   CREATININE 0.8   CALCIUM 9.0     CBC:     Recent Labs  Lab 06/15/18  0448   WBC 8.92   RBC 3.54*   HGB 10.4*   HCT 32.1*      MCV 91   MCH 29.4   MCHC 32.4     Lipid Panel:     Recent Labs  Lab 06/09/18  1712   CHOL 159   LDLCALC 109.4   HDL 36*   TRIG 68     Coagulation:     Recent Labs  Lab 06/09/18  1208   INR 1.0     Platelet Aggregation Study: No results for input(s): PLTAGG, PLTAGINTERP, PLTAGREGLACO, ADPPLTAGGREG in the last 168 hours.  Hgb A1C:     Recent Labs  Lab 06/09/18  1712   HGBA1C 6.2*     TSH:     Recent Labs  Lab 06/09/18  1712   TSH 1.507       Diagnostic Results     Brain Imaging     MRI 6/10   Large right MCA territory acute infarct with evidence of hemorrhagic reconversion as above.  Subtle subarachnoid hemorrhage overlying right frontal sulci.     CTH/CTA 6/9   CT head: Acute right MCA distribution infarct with associated mild localized mass effect.  No significant midline shift or acute intracranial hemorrhage.  Superimposed generalized cerebral volume loss and chronic microvascular ischemic change.  CTA head: Abrupt termination of the distal M1 segment of the right MCA concerning for large vessel occlusion with good filling of distal branches via collateral vessels.  Mild bilateral carotid atherosclerotic plaque with less than 50% hemodynamically significant stenosis by NASCET criteria.     Cardiac  Imaging   CONCLUSIONS     1 - Moderate left atrial enlargement.     2 - Normal left ventricular systolic function (EF 60-65%).     3 - No wall motion abnormalities.     4 - Normal left ventricular diastolic function.     5 - Normal right ventricular systolic function .     6 - Mild to moderate aortic stenosis, ESTELITA = 1.17 cm2, AVAi = 0.68 cm2/m2, peak velocity = 2.75 m/s, mean gradient = 17 mmHg.     7 - Mild aortic regurgitation.     8 - The estimated PA systolic pressure is 25 mmHg.     9 - Mild mitral regurgitation.

## 2018-06-15 NOTE — PLAN OF CARE
Problem: Physical Therapy Goal  Goal: Physical Therapy Goal    Goals to be met by 6/22/2018    1. Pt will perform rolling to the R and L with SBA.   2. Pt will perform supine to sit from both sides of the bed with SBA.  3. Pt will perform sit to supine with SBA.  4. Pt will perform sit to stand transfers with SBA with no AD.    5. Pt will perform bed <> chair transfers with SBA with no AD.  6. Pt will perform gait x 150 feet with SBA and no AD.       Discharge Recommendations: Rehab    Pt safe to transfer with RN/PCT/family via SPT: Use no AD with min A of 1 person.    Goals remain appropriate.     Darby Fontana, PTA.   [phone]   6/15/2018

## 2018-06-15 NOTE — PLAN OF CARE
[facility]    FACILITY TRANSFER ORDERS      Patient Name: Isa Sewell  YOB: 1931    PCP: Josh Oconnor MD   PCP Address: [address] / [place]  PCP Phone Number: [phone]  PCP Fax: [phone]    Encounter Date: 06/15/2018    Admit to: Rehabilitation facility     Vital Signs:  Routine    Diagnoses:   Active Hospital Problems    Diagnosis  POA    *Embolic stroke involving right middle cerebral artery [I63.411]  Yes    Hyperlipidemia [E78.5]  Yes     Priority: 2     Benign essential HTN [I10]  Yes     Priority: 2     Dysphagia [R13.10]  Yes    Cytotoxic cerebral edema [G93.6]  Yes    Pemphigus [L10.9]  Yes      Resolved Hospital Problems    Diagnosis Date Resolved POA   No resolved problems to display.       Allergies:  Review of patient's allergies indicates:   Allergen Reactions    Sulfa (sulfonamide antibiotics) Hives       Diet: Puree, Liquid Diet Level: Nectar Thick     · Meds crushed in puree  · Remain upright 30 minutes post meal  · Small bites/sips     Activities: As per PT / OT recommendations     Nursing: As per facility protocol      Labs:  As per facility protocol     CONSULTS:    Physical Therapy to evaluate and treat. , Occupational Therapy to evaluate and treat. and Speech Therapy to evaluate and treat for Language and Swallowing.    MISCELLANEOUS CARE:  Routine Skin for Bedridden Patients: Apply moisture barrier cream to all skin folds and wet areas in perineal area daily and after baths and all bowel movements.    WOUND CARE ORDERS  None    Medications: Review discharge medications with patient and family and provide education.      Current Discharge Medication List      START taking these medications    Details   aspirin 325 MG tablet Take 1 tablet (325 mg total) by mouth once daily.  Refills: 0      atorvastatin (LIPITOR) 40 MG tablet Take 1 tablet (40 mg total) by mouth once daily.  Qty: 90 tablet, Refills: 3      clopidogrel  (PLAVIX) 75 mg tablet Take 1 tablet (75 mg total) by mouth once daily.  Qty: 30 tablet, Refills: 11      olmesartan (BENICAR) 40 MG tablet Take 1 tablet (40 mg total) by mouth once daily.  Qty: 90 tablet, Refills: 3         CONTINUE these medications which have NOT CHANGED    Details   amlodipine (NORVASC) 5 MG tablet Take 5 mg by mouth once daily.      clobetasol 0.05% (TEMOVATE) 0.05 % Oint APPLY TO AFFECTED AREA TWICE DAILY. AVOID FACE, ARMPITS, AND GROIN.  Qty: 60 g, Refills: 2      levocetirizine (XYZAL) 5 MG tablet Take 5 mg by mouth every evening.      triamcinolone acetonide 0.1% (KENALOG) 0.1 % cream APPLY TO THE AFFECTED AREA TWICE DAILY.  Qty: 453.6 g, Refills: 3    Associated Diagnoses: Pruritus      hydrOXYzine HCl (ATARAX) 25 MG tablet TAKE ONE TABLET BY MOUTH AT NIGHT AS NEEDED FOR ITCHING.  Qty: 30 tablet, Refills: 1    Associated Diagnoses: Bullous pemphigoid         STOP taking these medications       olmesartan-hydrochlorothiazide (BENICAR HCT) 40-25 mg per tablet Comments:   Reason for Stopping: (? GOUT concerns / avoid thiazide)                   _________________________________  Eduardo Lo MD  06/15/2018

## 2018-06-15 NOTE — PROGRESS NOTES
[facility]  Vascular Neurology  Comprehensive Stroke Center  Progress Note    Assessment/Plan:     * Embolic stroke involving right middle cerebral artery    86 yr old female with medical history of HTN presenting with R MCA syndrome, s/p IV tpA in ED.     - CTA: Right distal M1 / Mild bilateral carotid atherosclerotic plaque with less than 50%  - MRI: Right MCA - right insular cortex and right frontal lobe / PH1   - IR thrombectomy / TICI 3   - Echo: Moderate left atrial enlargement.     - ESUS - Likely cardioembolic     - : NICU step down / Improvement in NIHSS - Left sided drift / facial droop / dysarthria +      Antithrombotics for secondary stroke prevention: Antiplatelets:  / Plavix 75      Statins for secondary stroke prevention and hyperlipidemia, if present: Statins: Atorvastatin- 40 mg daily     Aggressive risk factor modification: None     Rehab efforts: PT/OT/SLP to evaluate and treat / rehab today      Diagnostics ordered/pendin day event monitor       VTE prophylaxis: Heparin     BP parameters: Infarct: Post sucessful thrombectomy, SBP <140          Hyperlipidemia    -    - Maintain high potency statins         Benign essential HTN    - on amlodipine and olmesartan at home.   - SBP goal is <140.         Dysphagia    - : Cleared MBSS for puree         Cytotoxic cerebral edema    - evident on imaging         Pain in toe of left foot    - ? Gouty / prior + h/o   - Xray negative / uric acid negative elevation     - colchicine trial + for sym alleviation     - op rheum follow-up   - avoid thiazide         Pemphigus    - continue home medications              Patient was transferred from OSH with Right MCA stroke. She received tPA. She is going to IR for thrombectomy.   6/10 she had small area of hemorrhagic conversion. She is clinically improving. She failed her swallow eval.   : Neuro ICU step down / Has extinction & mild LSW  + / SLP cleared for puree    6/14-15: No concerns for worsening NIHSS / Great toe swelling - Xray negative, colchicine trial effective / Plans for rehab dispo with op rheum / vascular neurology f/u     STROKE DOCUMENTATION   Acute Stroke Times   Last Known Normal Date: 06/09/18  Symptom Onset Date: 06/09/18  Stroke Team Called Date: 06/09/18  Stroke Team Arrival Date: 06/09/18  CT Interpretation Time: 1411  Decision to Treat Time for IR: 1430    NIH Scale:  1a. Level Of Consciousness: 0-->Alert: keenly responsive  1b. LOC Questions: 0-->Answers both questions correctly  1c. LOC Commands: 0-->Performs both tasks correctly  2. Best Gaze: 0-->Normal  3. Visual: 0-->No visual loss  4. Facial Palsy: 1-->Minor paralysis (flattened nasolabial fold, asymmetry on smiling)  5a. Motor Arm, Left: 1-->Drift: limb holds 90 (or 45) degrees, but drifts down before full 10 seconds: does not hit bed or other support  5b. Motor Arm, Right: 0-->No drift: limb holds 90 (or 45) degrees for full 10 secs  6a. Motor Leg, Left: 1-->Drift: leg falls by the end of the 5-sec period but does not hit bed  6b. Motor Leg, Right: 0-->No drift: leg holds 30 degree position for full 5 secs  7. Limb Ataxia: 0-->Absent  8. Sensory: 1-->Mild-to-moderate sensory loss: patient feels pinprick is less sharp or is dull on the affected side: or there is a loss of superficial pain with pinprick, but patient is aware of being touched  9. Best Language: 0-->No aphasia: normal  10. Dysarthria: 1-->Mild-to-moderate dysarthria: patient slurs at least some words and, at worst, can be understood with some difficulty  11. Extinction and Inattention (formerly Neglect): 1-->Visual, tactile, auditory, spatial, or personal inattention or extinction to bilateral simultaneous stimulation in one of the sensory modalities  Total (NIH Stroke Scale): 6       Modified [place]    [place] Coma Scale:    ABCD2 Score:    [id number] Score:   HAS -BLED Score:   ICH Score:   Hunt & Morales Classification:       Hemorrhagic change of an Ischemic Stroke: Does this patient have an ischemic stroke with hemorrhagic changes? No     Neurologic Chief Complaint: Right embolic MCA stroke     Subjective:     Interval History: Patient is seen for follow-up neurological assessment and treatment recommendations:    NAEON. No concerns for worsening NIHSS.     ? Gouty great toe swelling / pain concerns : colchicine response effective. Plans for op rheum eval       HPI, Past Medical, Family, and Social History remains the same as documented in the initial encounter.     Review of Systems   Constitutional: Negative for chills and fever.   HENT: Negative for trouble swallowing.    Respiratory: Negative for shortness of breath.    Cardiovascular: Negative for chest pain.   Genitourinary: Negative for dysuria.   Neurological: Positive for facial asymmetry, weakness and numbness. Negative for tremors and headaches.   Psychiatric/Behavioral: Negative for agitation, behavioral problems and confusion.     Scheduled Meds:    Continuous Infusions:  PRN Meds:    Objective:     Vital Signs (Most Recent):  Temp: 98.8 °F (37.1 °C) (06/15/18 1202)  Pulse: 60 (06/15/18 1202)  Resp: 17 (06/15/18 1202)  BP: 136/65 (06/15/18 1202)  SpO2: 95 % (06/15/18 1202)  BP Location: Right arm    Vital Signs Range (Last 24H):  Temp:  [98.2 °F (36.8 °C)-98.8 °F (37.1 °C)]   Pulse:  [60-88]   Resp:  [12-20]   BP: (136-144)/(60-65)   SpO2:  [94 %-99 %]   BP Location: Right arm    Physical Exam   Constitutional: No distress.   HENT:   Head: Normocephalic and atraumatic.   Eyes: EOM are normal.   Neck: Normal range of motion. Neck supple.   Cardiovascular:   No murmur heard.  Pulmonary/Chest: No respiratory distress.   Abdominal: Soft.   Musculoskeletal: She exhibits no deformity.       Neurological Exam:   LOC: alert  Attention Span: Good   Language: No aphasia  Articulation: Dysarthria  Orientation: Not oriented to time  Visual Fields: Inferior quadrantanopsia:  left  EOM (CN III, IV, VI): Full/intact  Pupils (CN II, III): PERRL  Facial Sensation (CN V): Normal  Facial Movement (CN VII): Lower facial weakness on the Left  Gag Reflex: present  Reflexes: 2+ throughout  Motor: Arm left  Paresis: 4/5  Leg left  Paresis: 4/5  Arm right  Normal 5/5  Leg right Normal 5/5  Cebellar: No evidence of appendicular or axial ataxia  Sensation: Fawad-hypoesthesia left  Tone: Normal tone throughout    Laboratory:  CMP:     Recent Labs  Lab 06/15/18  0448   CALCIUM 9.0   ALBUMIN 2.5*   PROT 6.5      K 4.0   CO2 25      BUN 21   CREATININE 0.8   ALKPHOS 84   ALT 18   AST 21   BILITOT 0.7     BMP:     Recent Labs  Lab 06/15/18  0448      K 4.0      CO2 25   BUN 21   CREATININE 0.8   CALCIUM 9.0     CBC:     Recent Labs  Lab 06/15/18  0448   WBC 8.92   RBC 3.54*   HGB 10.4*   HCT 32.1*      MCV 91   MCH 29.4   MCHC 32.4     Lipid Panel:     Recent Labs  Lab 06/09/18  1712   CHOL 159   LDLCALC 109.4   HDL 36*   TRIG 68     Coagulation:     Recent Labs  Lab 06/09/18  1208   INR 1.0     Platelet Aggregation Study: No results for input(s): PLTAGG, PLTAGINTERP, PLTAGREGLACO, ADPPLTAGGREG in the last 168 hours.  Hgb A1C:     Recent Labs  Lab 06/09/18  1712   HGBA1C 6.2*     TSH:     Recent Labs  Lab 06/09/18  1712   TSH 1.507       Diagnostic Results     Brain Imaging     MRI 6/10   Large right MCA territory acute infarct with evidence of hemorrhagic reconversion as above.  Subtle subarachnoid hemorrhage overlying right frontal sulci.     CTH/CTA 6/9   CT head: Acute right MCA distribution infarct with associated mild localized mass effect.  No significant midline shift or acute intracranial hemorrhage.  Superimposed generalized cerebral volume loss and chronic microvascular ischemic change.  CTA head: Abrupt termination of the distal M1 segment of the right MCA concerning for large vessel occlusion with good filling of distal branches via collateral vessels.  Mild  bilateral carotid atherosclerotic plaque with less than 50% hemodynamically significant stenosis by NASCET criteria.     Cardiac Imaging   CONCLUSIONS     1 - Moderate left atrial enlargement.     2 - Normal left ventricular systolic function (EF 60-65%).     3 - No wall motion abnormalities.     4 - Normal left ventricular diastolic function.     5 - Normal right ventricular systolic function .     6 - Mild to moderate aortic stenosis, ESTELITA = 1.17 cm2, AVAi = 0.68 cm2/m2, peak velocity = 2.75 m/s, mean gradient = 17 mmHg.     7 - Mild aortic regurgitation.     8 - The estimated PA systolic pressure is 25 mmHg.     9 - Mild mitral regurgitation.       Eduardo Lo MD  Comprehensive Stroke Center  Department of Vascular Neurology   [facility]

## 2018-06-15 NOTE — PT/OT/SLP DISCHARGE
Occupational Therapy Discharge Summary    Isa Sewell  MRN: [id number]   Principal Problem: Embolic stroke involving right middle cerebral artery      Patient Discharged from acute Occupational Therapy on 6/15.  Please refer to prior OT note dated 6/14 for functional status.    Assessment:      Patient appropriate for care in another setting.    Objective:     GOALS:    Occupational Therapy Goals        Problem: Occupational Therapy Goal    Goal Priority Disciplines Outcome Interventions   Occupational Therapy Goal     OT, PT/OT Ongoing (interventions implemented as appropriate)    Description:  Goals to be met by: 6/20/2018    Patient will increase functional independence with ADLs by performing:    UE Dressing with Contact Guard Assistance.  LE Dressing with Moderate Assistance.  Grooming while standing with Minimal Assistance.  Toileting from bedside commode with Minimal Assistance for hygiene and clothing management.   Sitting at edge of bed x 20 minutes with Stand by Assistance.  Supine to sit with Minimal Assistance.  Stand pivot transfers with Minimal Assistance.  Toilet transfer to bedside commode with Minimal Assistance.                      Reasons for Discontinuation of Therapy Services  Transfer to alternate level of care.      Plan:     Patient Discharged to: Inpatient Rehab    SUSU Palacio  6/15/2018

## 2018-06-15 NOTE — CARE UPDATE
Plan of care reviewed with patient and patient's family, demonstrated understanding. Patient resting comfortably. NAEON, VSS, neuro status stable. Will continue to monitor.

## 2018-06-28 ENCOUNTER — CLINICAL SUPPORT (OUTPATIENT)
Dept: NEUROSURGERY | Facility: CLINIC | Age: 83
End: 2018-06-28
Payer: MEDICARE

## 2018-06-28 VITALS
HEART RATE: 63 BPM | WEIGHT: 171 LBS | BODY MASS INDEX: 32.71 KG/M2 | SYSTOLIC BLOOD PRESSURE: 166 MMHG | TEMPERATURE: 98 F | DIASTOLIC BLOOD PRESSURE: 72 MMHG

## 2018-06-28 PROCEDURE — 99999 PR PBB SHADOW E&M-EST. PATIENT-LVL III: CPT | Mod: PBBFAC,,,

## 2018-06-28 PROCEDURE — 99213 OFFICE O/P EST LOW 20 MIN: CPT | Mod: PBBFAC

## 2018-06-28 RX ORDER — TRIAMCINOLONE ACETONIDE 1 MG/G
OINTMENT TOPICAL
Refills: 3 | Status: ON HOLD | COMMUNITY
Start: 2018-05-01 | End: 2018-09-04 | Stop reason: HOSPADM

## 2018-06-28 RX ORDER — OLMESARTAN MEDOXOMIL-HYDROCHLOROTHIAZIDE 40; 25 MG/1; MG/1
TABLET, FILM COATED ORAL
Refills: 3 | Status: ON HOLD | COMMUNITY
Start: 2018-03-29 | End: 2018-09-04 | Stop reason: HOSPADM

## 2018-06-28 NOTE — PROGRESS NOTES
Wound Check   Neurosurgery      Ms. Isa Sewell is a pleasant 87 y.o. female s/p right diagnostic angiogram with Dr. Garcia on 6/9/18. She presents to clinic today for her 2 week wound check. Pt is in a wheelchair, is in NAD. Denies fevers, chills, night sweats, back pain or N/V.      Right groin is clean, dry and intact with no signs of erythema, ecchymosis, swelling or purulent drainage. All skin edges are completely approximated. Angio site is soft to the touch. Pt denies discomfort at incision. Femoral and pedal pulses are palpable.    Encouraged patient to call if they have any questions or concerns.         Elaine Duran RN  Neurosurgery  [phone]

## 2018-07-10 ENCOUNTER — HOME CARE VISIT (OUTPATIENT)
Dept: NEUROLOGY | Facility: HOSPITAL | Age: 83
End: 2018-07-10

## 2018-07-10 VITALS
SYSTOLIC BLOOD PRESSURE: 140 MMHG | WEIGHT: 161.19 LBS | HEART RATE: 65 BPM | OXYGEN SATURATION: 99 % | DIASTOLIC BLOOD PRESSURE: 64 MMHG | BODY MASS INDEX: 30.83 KG/M2

## 2018-07-11 NOTE — PROGRESS NOTES
Mrs. Sewell VS are WNL on today's visit. She denies any personal history of stroke but has familial history of stroke. She lives with spouse in the home. Has HH nursing, PT, ST, and OT services. LHE educated patient and caregivers present on TPA, stroke RF, and purpose of SM program. SM nurse educated patient on proper hydration.

## 2018-07-16 ENCOUNTER — TELEPHONE (OUTPATIENT)
Dept: NEUROLOGY | Facility: CLINIC | Age: 83
End: 2018-07-16

## 2018-07-16 ENCOUNTER — OFFICE VISIT (OUTPATIENT)
Dept: NEUROLOGY | Facility: CLINIC | Age: 83
End: 2018-07-16
Payer: MEDICARE

## 2018-07-16 VITALS
SYSTOLIC BLOOD PRESSURE: 139 MMHG | HEIGHT: 62 IN | DIASTOLIC BLOOD PRESSURE: 70 MMHG | WEIGHT: 166.25 LBS | HEART RATE: 64 BPM | BODY MASS INDEX: 30.59 KG/M2

## 2018-07-16 DIAGNOSIS — I63.411 EMBOLIC STROKE INVOLVING RIGHT MIDDLE CEREBRAL ARTERY: ICD-10-CM

## 2018-07-16 PROBLEM — G93.6 CYTOTOXIC CEREBRAL EDEMA: Status: RESOLVED | Noted: 2018-06-11 | Resolved: 2018-07-16

## 2018-07-16 PROBLEM — M54.2 NECK PAIN: Status: RESOLVED | Noted: 2017-11-03 | Resolved: 2018-07-16

## 2018-07-16 PROBLEM — Z92.82 TISSUE PLASMINOGEN ACTIVATOR (T-PA) ADMINISTERED AT OTHER FACILITY WITHIN 24 HOURS PRIOR TO CURRENT ADMISSION: Status: RESOLVED | Noted: 2018-06-15 | Resolved: 2018-07-16

## 2018-07-16 PROCEDURE — 99214 OFFICE O/P EST MOD 30 MIN: CPT | Mod: S$PBB,,, | Performed by: PSYCHIATRY & NEUROLOGY

## 2018-07-16 PROCEDURE — 99999 PR PBB SHADOW E&M-EST. PATIENT-LVL III: CPT | Mod: PBBFAC,,, | Performed by: PSYCHIATRY & NEUROLOGY

## 2018-07-16 PROCEDURE — 99213 OFFICE O/P EST LOW 20 MIN: CPT | Mod: PBBFAC,PO | Performed by: PSYCHIATRY & NEUROLOGY

## 2018-07-16 RX ORDER — INDOMETHACIN 50 MG/1
50 CAPSULE ORAL 2 TIMES DAILY WITH MEALS
Status: ON HOLD | COMMUNITY
End: 2018-09-04 | Stop reason: HOSPADM

## 2018-07-16 RX ORDER — PHENAZOPYRIDINE HYDROCHLORIDE 200 MG/1
200 TABLET, FILM COATED ORAL 3 TIMES DAILY PRN
Status: ON HOLD | COMMUNITY
End: 2021-04-12 | Stop reason: HOSPADM

## 2018-07-16 RX ORDER — AMOXICILLIN AND CLAVULANATE POTASSIUM 500; 125 MG/1; MG/1
TABLET, FILM COATED ORAL
Refills: 0 | Status: ON HOLD | COMMUNITY
Start: 2018-07-13 | End: 2018-09-04 | Stop reason: HOSPADM

## 2018-07-16 RX ORDER — PANTOPRAZOLE SODIUM 40 MG/1
GRANULE, DELAYED RELEASE ORAL
Refills: 0 | COMMUNITY
Start: 2018-06-22 | End: 2018-10-19

## 2018-07-16 RX ORDER — CETIRIZINE HYDROCHLORIDE 10 MG/1
10 TABLET ORAL DAILY
COMMUNITY

## 2018-07-16 RX ORDER — TALC
3 POWDER (GRAM) TOPICAL NIGHTLY
Qty: 90 TABLET | Refills: 3 | Status: SHIPPED | OUTPATIENT
Start: 2018-07-16

## 2018-07-16 NOTE — PATIENT INSTRUCTIONS
trok  Symptoms of a Stroke     A sudden feeling of weakness on one side of your body may be a sign that you are having a stroke.   During a stroke, blood stops flowing to part of the brain. This can damage areas in the brain that control the rest of the body. Get help right away if any of these symptoms come on suddenly, even if the symptoms dont last.  Know the symptoms of a stroke  · Weakness. You may feel a sudden weakness, tingling, or a loss of feeling on 1 side of your face or body including your arm or leg.   · Vision problems. You may have sudden double vision or trouble seeing in 1 or both eyes.  · Speech problems. You may have sudden trouble talking, slurred speech, or problems understanding others.  · Headache. You may have a sudden, severe headache.  · Movement problems. You may have sudden trouble walking, dizziness, a feeling of spinning, a loss of balance, a feeling of falling, or blackouts.  · Seizure. You may also have a seizure with a large or hemorrhagic stroke.   Remember: If you have any of these symptoms, call 911 and your doctor as soon as possible.  F.A.S.T. is an easy way to remember the signs of a stroke. When you see these signs, you will know that you need to call 911 fast.   F.A.S.T. stands for:  · F is for face drooping - One side of the face is drooping or numb. When the person smiles, the smile is uneven.  · A is for arm weakness - One arm is weak or numb. When the person lifts both arms at the same time, one arm may drift downward.  · S is for speech difficulty - You may notice slurred speech or difficulty speaking. The person can't repeat a simple sentence correctly when asked.  · T is for time to dial 911 - If someone shows any of these symptoms, even if they go away, call 911 immediately. Make note of the time the symptoms first appeared.   Date Last Reviewed: 8/26/2015  © 7300-2730 [employer]. [address] [place] [address], [place] [address]. All rights reserved. This  information is not intended as a substitute for professional medical care. Always follow your healthcare professional's instructions.

## 2018-07-16 NOTE — TELEPHONE ENCOUNTER
The patient came in to be seen today and had a question. May she still take the Atarax still for the itching . The patient only takes it PRN.

## 2018-07-16 NOTE — ASSESSMENT & PLAN NOTE
-- on plavix  -- on atorvastatin  -- patient does not smoke  -- stroke education completed  -- MRI personally reviewed with large R MCA infarct

## 2018-07-16 NOTE — PROGRESS NOTES
[facility] NEUROLOGY  [facility] [place]    Date: 7/16/18  Patient Name: Isa Sewell   MRN: [id number]   PCP: Josh Oconnor  Referring Provider: Self, Aaareferral    Assessment:   Isa Sewell is a 87 y.o. female presenting in follow-up for ischemic stroke.  Will discontinue aspirin today per chance trial of patient's completed over 3 weeks of dual antiplatelet therapy.  Reviewed workup as discussed below.  Plan:     Problem List Items Addressed This Visit        Neuro    Embolic stroke involving right middle cerebral artery    Current Assessment & Plan     -- on plavix  -- on atorvastatin  -- patient does not smoke  -- stroke education completed  -- MRI personally reviewed with large R MCA infarct             Syed Chen MD  [facility]   Department of Neurology    Patient note was created using Dragon Dictation.  Any errors in syntax or even information may not have been identified and edited on initial review prior to signing this note.  Subjective:        HPI:   Ms. Isa Sewell is a 87 y.o. female who presents in follow-up for ischemic stroke s/p TPA and thrombectomy.  The patient presents with her daughter contributes to the history.  Together they report the patient has been doing well since her hospitalization.  They state that she has had no new focal neurologic deficits since discharging from in the hospital.  She remains compliant with aspirin, Plavix, and statin therapy.    PAST MEDICAL HISTORY:  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/2016       PAST SURGICAL HISTORY:  Past Surgical History:   Procedure Laterality Date    BREAST BIOPSY Bilateral     BOTH BENIGN    CHOLECYSTECTOMY      FRACTURE SURGERY Right     ankle    HYSTERECTOMY       TONSILLECTOMY         CURRENT MEDS:  Current Outpatient Prescriptions   Medication Sig Dispense Refill    amlodipine (NORVASC) 5 MG tablet Take 5 mg by mouth once daily.      amoxicillin-clavulanate 500-125mg (AUGMENTIN) 500-125 mg Tab   0    atorvastatin (LIPITOR) 40 MG tablet Take 1 tablet (40 mg total) by mouth once daily. 90 tablet 3    cetirizine (ZYRTEC) 10 MG tablet Take 10 mg by mouth once daily.      clobetasol 0.05% (TEMOVATE) 0.05 % Oint APPLY TO AFFECTED AREA TWICE DAILY. AVOID FACE, ARMPITS, AND GROIN. 60 g 2    clopidogrel (PLAVIX) 75 mg tablet Take 1 tablet (75 mg total) by mouth once daily. 30 tablet 11    indomethacin (INDOCIN) 50 MG capsule Take 50 mg by mouth 2 (two) times daily with meals.      olmesartan (BENICAR) 40 MG tablet Take 1 tablet (40 mg total) by mouth once daily. 90 tablet 3    phenazopyridine (PYRIDIUM) 200 MG tablet Take 200 mg by mouth 3 (three) times daily as needed for Pain.      PROTONIX 40 mg GrPS USE 1 PACKET UTD QD  0    triamcinolone acetonide 0.1% (KENALOG) 0.1 % cream APPLY TO THE AFFECTED AREA TWICE DAILY. 453.6 g 3    triamcinolone acetonide 0.1% (KENALOG) 0.1 % ointment   3    BENICAR HCT 40-25 mg per tablet   3    hydrOXYzine HCl (ATARAX) 25 MG tablet TAKE ONE TABLET BY MOUTH AT NIGHT AS NEEDED FOR ITCHING. 30 tablet 1    levocetirizine (XYZAL) 5 MG tablet Take 5 mg by mouth every evening.      melatonin 3 mg Tab Take 1 tablet (3 mg total) by mouth every evening. 90 tablet 3     No current facility-administered medications for this visit.        ALLERGIES:  Review of patient's allergies indicates:   Allergen Reactions    Sulfa (sulfonamide antibiotics) Hives       FAMILY HISTORY:  Family History   Problem Relation Age of Onset    Melanoma Neg Hx     Psoriasis Neg Hx     Lupus Neg Hx     Eczema Neg Hx        SOCIAL HISTORY:  Social History   Substance Use Topics    Smoking status: Never Smoker    Smokeless tobacco: Not on file    Alcohol use No  "      Review of Systems:  12 review of systems is negative except for the symptoms mentioned in HPI.      Objective:     Vitals:    07/16/18 1311   Weight: 75.4 kg (166 lb 3.6 oz)   Height: 5' 2" (1.575 m)     General: NAD, well nourished   Eyes: no tearing, discharge, no erythema   ENT: moist mucous membranes of the oral cavity, nares patent    Neck: Supple, full range of motion  Cardiovascular: Warm and well perfused, pulses equal and symmetrical  Lungs: Normal work of breathing, normal chest wall excursions  Skin: No rash, lesions, or breakdown on exposed skin  Psychiatry: Mood and affect are appropriate   Abdomen: soft, non tender, non distended  Extremeties: No cyanosis, clubbing or edema.    Neurological   MENTAL STATUS: Alert and oriented to person, place, and time. Attention and concentration within normal limits. Speech without dysarthria, able to name and repeat without difficulty. Recent and remote memory within normal limits   CRANIAL NERVES: Visual fields intact. PERRL. EOMI. Facial sensation intact. Face symmetrical. Hearing grossly intact. Full shoulder shrug bilaterally. Tongue protrudes midline   SENSORY: Sensation is intact to light touch throughout with exception of patchy sensory loss over RUE.   MOTOR: Normal bulk and tone.  4/5 R and 4-/5 L deltoid, biceps, triceps, interosseous, hand  bilaterally. 4/5 R and 4-/5 L iliopsoas, knee extension/flexion, foot dorsi/plantarflexion bilaterally.    REFLEXES: Symmetric and 1+ throughout.   CEREBELLAR/COORDINATION/GAIT: Gait unsteady with circumduction of L leg. Finger to nose intact.       "

## 2018-07-19 ENCOUNTER — CLINICAL SUPPORT (OUTPATIENT)
Dept: ELECTROPHYSIOLOGY | Facility: CLINIC | Age: 83
End: 2018-07-19
Payer: MEDICARE

## 2018-07-19 DIAGNOSIS — R06.09 OTHER FORMS OF DYSPNEA: ICD-10-CM

## 2018-07-19 DIAGNOSIS — I63.411 EMBOLIC STROKE INVOLVING RIGHT MIDDLE CEREBRAL ARTERY: ICD-10-CM

## 2018-07-19 PROCEDURE — 93270 REMOTE 30 DAY ECG REV/REPORT: CPT | Mod: PBBFAC | Performed by: INTERNAL MEDICINE

## 2018-08-07 PROBLEM — R53.1 LEFT-SIDED WEAKNESS: Status: ACTIVE | Noted: 2018-08-07

## 2018-08-07 PROBLEM — Z74.09 IMPAIRED FUNCTIONAL MOBILITY, BALANCE, GAIT, AND ENDURANCE: Status: ACTIVE | Noted: 2018-08-07

## 2018-08-13 ENCOUNTER — HOME CARE VISIT (OUTPATIENT)
Dept: NEUROLOGY | Facility: HOSPITAL | Age: 83
End: 2018-08-13

## 2018-08-16 VITALS
BODY MASS INDEX: 29.85 KG/M2 | DIASTOLIC BLOOD PRESSURE: 74 MMHG | SYSTOLIC BLOOD PRESSURE: 122 MMHG | WEIGHT: 163.19 LBS | OXYGEN SATURATION: 99 % | HEART RATE: 72 BPM

## 2018-08-16 NOTE — PROGRESS NOTES
Mrs. Sewell VS are WNL on today's visit. Family present during visit. Home health has completed care, patient now attends outpatient therapy. Denies any ER visits or hospital stays. 30 event monitor in place. LHE educated patient and caregivers on purpose of 30 day event monitor, and how this plays in stroke management including recovery.

## 2018-08-30 PROCEDURE — 93272 ECG/REVIEW INTERPRET ONLY: CPT | Mod: ,,, | Performed by: INTERNAL MEDICINE

## 2018-09-03 ENCOUNTER — HOSPITAL ENCOUNTER (INPATIENT)
Facility: HOSPITAL | Age: 83
LOS: 1 days | Discharge: HOME | DRG: 176 | End: 2018-09-04
Attending: EMERGENCY MEDICINE | Admitting: HOSPITALIST
Payer: MEDICARE

## 2018-09-03 DIAGNOSIS — I26.99 PULMONARY EMBOLUS: ICD-10-CM

## 2018-09-03 DIAGNOSIS — I26.92 SADDLE EMBOLUS OF PULMONARY ARTERY: ICD-10-CM

## 2018-09-03 DIAGNOSIS — I26.99 ACUTE PULMONARY EMBOLUS: ICD-10-CM

## 2018-09-03 DIAGNOSIS — I82.412 ACUTE DEEP VEIN THROMBOSIS (DVT) OF FEMORAL VEIN OF LEFT LOWER EXTREMITY: Primary | ICD-10-CM

## 2018-09-03 DIAGNOSIS — M79.89 LEFT LEG SWELLING: ICD-10-CM

## 2018-09-03 PROBLEM — Z86.73 HX OF ISCHEMIC RIGHT MCA STROKE: Status: ACTIVE | Noted: 2018-09-03

## 2018-09-03 LAB
ALBUMIN SERPL BCP-MCNC: 3.4 G/DL
ALP SERPL-CCNC: 124 U/L
ALT SERPL W/O P-5'-P-CCNC: 14 U/L
ANION GAP SERPL CALC-SCNC: 10 MMOL/L
APTT BLDCRRT: 25.8 SEC
AST SERPL-CCNC: 17 U/L
BACTERIA #/AREA URNS AUTO: ABNORMAL /HPF
BASOPHILS # BLD AUTO: 0.04 K/UL
BASOPHILS # BLD AUTO: 0.05 K/UL
BASOPHILS NFR BLD: 0.5 %
BASOPHILS NFR BLD: 0.7 %
BILIRUB SERPL-MCNC: 0.6 MG/DL
BILIRUB UR QL STRIP: NEGATIVE
BNP SERPL-MCNC: 31 PG/ML
BUN SERPL-MCNC: 22 MG/DL
CALCIUM SERPL-MCNC: 9.5 MG/DL
CHLORIDE SERPL-SCNC: 109 MMOL/L
CLARITY UR REFRACT.AUTO: ABNORMAL
CO2 SERPL-SCNC: 23 MMOL/L
COLOR UR AUTO: YELLOW
CREAT SERPL-MCNC: 1 MG/DL
DIFFERENTIAL METHOD: ABNORMAL
DIFFERENTIAL METHOD: ABNORMAL
EOSINOPHIL # BLD AUTO: 0.3 K/UL
EOSINOPHIL # BLD AUTO: 0.3 K/UL
EOSINOPHIL NFR BLD: 3.3 %
EOSINOPHIL NFR BLD: 3.3 %
ERYTHROCYTE [DISTWIDTH] IN BLOOD BY AUTOMATED COUNT: 14.3 %
ERYTHROCYTE [DISTWIDTH] IN BLOOD BY AUTOMATED COUNT: 14.3 %
EST. GFR  (AFRICAN AMERICAN): 58.5 ML/MIN/1.73 M^2
EST. GFR  (NON AFRICAN AMERICAN): 50.8 ML/MIN/1.73 M^2
GLUCOSE SERPL-MCNC: 130 MG/DL
GLUCOSE UR QL STRIP: NEGATIVE
HCT VFR BLD AUTO: 38.8 %
HCT VFR BLD AUTO: 39.4 %
HGB BLD-MCNC: 12.2 G/DL
HGB BLD-MCNC: 12.3 G/DL
HGB UR QL STRIP: ABNORMAL
IMM GRANULOCYTES # BLD AUTO: 0.07 K/UL
IMM GRANULOCYTES # BLD AUTO: 0.09 K/UL
IMM GRANULOCYTES NFR BLD AUTO: 0.9 %
IMM GRANULOCYTES NFR BLD AUTO: 1.1 %
INR PPP: 0.9
KETONES UR QL STRIP: NEGATIVE
LEUKOCYTE ESTERASE UR QL STRIP: ABNORMAL
LYMPHOCYTES # BLD AUTO: 1.1 K/UL
LYMPHOCYTES # BLD AUTO: 1.2 K/UL
LYMPHOCYTES NFR BLD: 14 %
LYMPHOCYTES NFR BLD: 15.8 %
MCH RBC QN AUTO: 28.5 PG
MCH RBC QN AUTO: 28.6 PG
MCHC RBC AUTO-ENTMCNC: 31.2 G/DL
MCHC RBC AUTO-ENTMCNC: 31.4 G/DL
MCV RBC AUTO: 91 FL
MCV RBC AUTO: 91 FL
MICROSCOPIC COMMENT: ABNORMAL
MONOCYTES # BLD AUTO: 0.7 K/UL
MONOCYTES # BLD AUTO: 0.8 K/UL
MONOCYTES NFR BLD: 9.3 %
MONOCYTES NFR BLD: 9.4 %
NEUTROPHILS # BLD AUTO: 5.3 K/UL
NEUTROPHILS # BLD AUTO: 5.7 K/UL
NEUTROPHILS NFR BLD: 70 %
NEUTROPHILS NFR BLD: 71.7 %
NITRITE UR QL STRIP: NEGATIVE
NRBC BLD-RTO: 0 /100 WBC
NRBC BLD-RTO: 0 /100 WBC
PH UR STRIP: 5 [PH] (ref 5–8)
PLATELET # BLD AUTO: 172 K/UL
PLATELET # BLD AUTO: 175 K/UL
PMV BLD AUTO: 9.1 FL
PMV BLD AUTO: 9.2 FL
POTASSIUM SERPL-SCNC: 4.1 MMOL/L
PROT SERPL-MCNC: 7 G/DL
PROT UR QL STRIP: NEGATIVE
PROTHROMBIN TIME: 10 SEC
RBC # BLD AUTO: 4.26 M/UL
RBC # BLD AUTO: 4.31 M/UL
RBC #/AREA URNS AUTO: 8 /HPF (ref 0–4)
SODIUM SERPL-SCNC: 142 MMOL/L
SP GR UR STRIP: 1.02 (ref 1–1.03)
SQUAMOUS #/AREA URNS AUTO: 3 /HPF
TROPONIN I SERPL DL<=0.01 NG/ML-MCNC: 0.02 NG/ML
TROPONIN I SERPL DL<=0.01 NG/ML-MCNC: 0.03 NG/ML
URN SPEC COLLECT METH UR: ABNORMAL
UROBILINOGEN UR STRIP-ACNC: NEGATIVE EU/DL
WBC # BLD AUTO: 7.6 K/UL
WBC # BLD AUTO: 7.94 K/UL
WBC #/AREA URNS AUTO: 8 /HPF (ref 0–5)

## 2018-09-03 PROCEDURE — 93010 ELECTROCARDIOGRAM REPORT: CPT | Mod: ,,, | Performed by: INTERNAL MEDICINE

## 2018-09-03 PROCEDURE — 93308 TTE F-UP OR LMTD: CPT | Mod: 26,,, | Performed by: INTERNAL MEDICINE

## 2018-09-03 PROCEDURE — 80053 COMPREHEN METABOLIC PANEL: CPT

## 2018-09-03 PROCEDURE — 99285 EMERGENCY DEPT VISIT HI MDM: CPT | Mod: ,,, | Performed by: EMERGENCY MEDICINE

## 2018-09-03 PROCEDURE — 20600001 HC STEP DOWN PRIVATE ROOM

## 2018-09-03 PROCEDURE — 99285 EMERGENCY DEPT VISIT HI MDM: CPT | Mod: 25

## 2018-09-03 PROCEDURE — 85610 PROTHROMBIN TIME: CPT

## 2018-09-03 PROCEDURE — 96374 THER/PROPH/DIAG INJ IV PUSH: CPT

## 2018-09-03 PROCEDURE — 84484 ASSAY OF TROPONIN QUANT: CPT

## 2018-09-03 PROCEDURE — 36415 COLL VENOUS BLD VENIPUNCTURE: CPT

## 2018-09-03 PROCEDURE — 96372 THER/PROPH/DIAG INJ SC/IM: CPT | Mod: 59

## 2018-09-03 PROCEDURE — 83880 ASSAY OF NATRIURETIC PEPTIDE: CPT

## 2018-09-03 PROCEDURE — 85025 COMPLETE CBC W/AUTO DIFF WBC: CPT

## 2018-09-03 PROCEDURE — 25500020 PHARM REV CODE 255: Performed by: HOSPITALIST

## 2018-09-03 PROCEDURE — 99223 1ST HOSP IP/OBS HIGH 75: CPT | Mod: ,,, | Performed by: HOSPITALIST

## 2018-09-03 PROCEDURE — 81001 URINALYSIS AUTO W/SCOPE: CPT

## 2018-09-03 PROCEDURE — 85730 THROMBOPLASTIN TIME PARTIAL: CPT

## 2018-09-03 PROCEDURE — 84484 ASSAY OF TROPONIN QUANT: CPT | Mod: 91

## 2018-09-03 PROCEDURE — 93308 TTE F-UP OR LMTD: CPT

## 2018-09-03 PROCEDURE — 63600175 PHARM REV CODE 636 W HCPCS: Performed by: PHYSICIAN ASSISTANT

## 2018-09-03 PROCEDURE — 63600175 PHARM REV CODE 636 W HCPCS: Performed by: STUDENT IN AN ORGANIZED HEALTH CARE EDUCATION/TRAINING PROGRAM

## 2018-09-03 PROCEDURE — 93005 ELECTROCARDIOGRAM TRACING: CPT

## 2018-09-03 RX ORDER — ENOXAPARIN SODIUM 100 MG/ML
1 INJECTION SUBCUTANEOUS
Status: COMPLETED | OUTPATIENT
Start: 2018-09-03 | End: 2018-09-03

## 2018-09-03 RX ORDER — ACETAMINOPHEN 500 MG
1000 TABLET ORAL EVERY 8 HOURS PRN
Status: DISCONTINUED | OUTPATIENT
Start: 2018-09-03 | End: 2018-09-04 | Stop reason: HOSPADM

## 2018-09-03 RX ORDER — ATORVASTATIN CALCIUM 20 MG/1
40 TABLET, FILM COATED ORAL DAILY
Status: DISCONTINUED | OUTPATIENT
Start: 2018-09-04 | End: 2018-09-04 | Stop reason: HOSPADM

## 2018-09-03 RX ORDER — CLOPIDOGREL BISULFATE 75 MG/1
75 TABLET ORAL DAILY
Status: DISCONTINUED | OUTPATIENT
Start: 2018-09-04 | End: 2018-09-04 | Stop reason: HOSPADM

## 2018-09-03 RX ORDER — IBUPROFEN 200 MG
24 TABLET ORAL
Status: DISCONTINUED | OUTPATIENT
Start: 2018-09-03 | End: 2018-09-04 | Stop reason: HOSPADM

## 2018-09-03 RX ORDER — GLUCAGON 1 MG
1 KIT INJECTION
Status: DISCONTINUED | OUTPATIENT
Start: 2018-09-03 | End: 2018-09-04 | Stop reason: HOSPADM

## 2018-09-03 RX ORDER — HYDRALAZINE HYDROCHLORIDE 25 MG/1
25 TABLET, FILM COATED ORAL EVERY 8 HOURS PRN
Status: DISCONTINUED | OUTPATIENT
Start: 2018-09-03 | End: 2018-09-04 | Stop reason: HOSPADM

## 2018-09-03 RX ORDER — AMLODIPINE BESYLATE 5 MG/1
5 TABLET ORAL DAILY
Status: DISCONTINUED | OUTPATIENT
Start: 2018-09-04 | End: 2018-09-04

## 2018-09-03 RX ORDER — HEPARIN SODIUM,PORCINE/D5W 25000/250
18 INTRAVENOUS SOLUTION INTRAVENOUS CONTINUOUS
Status: DISCONTINUED | OUTPATIENT
Start: 2018-09-03 | End: 2018-09-04

## 2018-09-03 RX ORDER — CETIRIZINE HYDROCHLORIDE 10 MG/1
10 TABLET ORAL DAILY
Status: DISCONTINUED | OUTPATIENT
Start: 2018-09-04 | End: 2018-09-04 | Stop reason: HOSPADM

## 2018-09-03 RX ORDER — IBUPROFEN 200 MG
16 TABLET ORAL
Status: DISCONTINUED | OUTPATIENT
Start: 2018-09-03 | End: 2018-09-04 | Stop reason: HOSPADM

## 2018-09-03 RX ORDER — SODIUM CHLORIDE 0.9 % (FLUSH) 0.9 %
5 SYRINGE (ML) INJECTION
Status: DISCONTINUED | OUTPATIENT
Start: 2018-09-03 | End: 2018-09-04 | Stop reason: HOSPADM

## 2018-09-03 RX ADMIN — HEPARIN SODIUM AND DEXTROSE 18 UNITS/KG/HR: 10000; 5 INJECTION INTRAVENOUS at 09:09

## 2018-09-03 RX ADMIN — ENOXAPARIN SODIUM 70 MG: 100 INJECTION SUBCUTANEOUS at 07:09

## 2018-09-03 RX ADMIN — IOHEXOL 75 ML: 350 INJECTION, SOLUTION INTRAVENOUS at 07:09

## 2018-09-03 NOTE — ED NOTES
Pt identifiers checked and accurate with Isa Sewell     Pt reports to ED with complaints of SOB beginning Tuesday. Pt reports becoming SOB during rehab session on Tuesday and has continued to experience episodes of SOB since then. Pt denies CP, N/V/D, headache, LOC, dizziness, confusion.     LOC: The patient is awake, alert and aware of environment with an appropriate affect, the patient is oriented x 3 and speaking appropriately.  APPEARANCE: Patient resting comfortably and in no acute distress, patient is clean and well groomed  SKIN: The skin is warm and dry, color consistent with ethnicity, patient has normal skin turgor and moist mucus membranes, skin intact, no breakdown or bruising noted.  MUSCULOSKELETAL: Patient moving all extremities well, no obvious swelling or deformities noted.   RESPIRATORY: Airway is open and patent, breath sounds clear throughout all lung fields; respirations are spontaneous, patient has a normal effort and rate, no accessory muscle use noted.   CARDIAC: Patient has peripheral edema noted to bilateral lower extremities, capillary refill < 3 seconds. No complaints of chest pain   ABDOMEN: Soft and non tender to palpation, no distention noted. Bowel sounds present x 4  NEUROLOGIC: PERRL, 3 mm bilaterally, eyes open spontaneously, behavior appropriate to situation, follows commands, purposeful motor responses noted. Pt reports hx of stroke x 1 month, left sided numbness with weakness to left lower extremity.

## 2018-09-03 NOTE — ED PROVIDER NOTES
Encounter Date: 9/3/2018       History     Chief Complaint   Patient presents with    Shortness of Breath     started about 1 week ago, now legs swollen     This is an 87 year old female with a PMH of HTN, DVT, stroke, bullous pemphigoid who presents to the ED with a chief complaint of shortness of breath. Patient sustained a R MCA infarct in June s/p TPA and IR thrombectomy with residual left sided weakness. She is undergoing outpatient therapy. Last week she was at therapy when she became acutely short of breath. She endorses nausea with the initial episode. Since onset she endorses MAYO and intermittent SOB. Denies chest pain associated with this. She endorses left leg swelling, which she noticed this morning.  Patient denies fever, chills, URI symptoms, cough, orthopnea, PND, vomiting, abdominal pain, changes in urination or additional complaints.          Review of patient's allergies indicates:   Allergen Reactions    Sulfa (sulfonamide antibiotics) Hives     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/2016     Past Surgical History:   Procedure Laterality Date    BREAST BIOPSY Bilateral     BOTH BENIGN    CHOLECYSTECTOMY      FRACTURE SURGERY Right     ankle    HYSTERECTOMY      TONSILLECTOMY       Family History   Problem Relation Age of Onset    Melanoma Neg Hx     Psoriasis Neg Hx     Lupus Neg Hx     Eczema Neg Hx      Social History     Tobacco Use    Smoking status: Never Smoker   Substance Use Topics    Alcohol use: No    Drug use: Not on file     Review of Systems   Constitutional: Negative for chills and fever.   HENT: Negative for sore throat.    Respiratory: Positive for shortness of breath.    Cardiovascular: Positive for leg swelling (left). Negative for chest pain.    Gastrointestinal: Positive for nausea. Negative for vomiting.   Genitourinary: Negative for dysuria.   Musculoskeletal: Negative for back pain.   Skin: Negative for color change.   Neurological: Positive for weakness (residual L sided, at baseline).   Hematological: Does not bruise/bleed easily.       Physical Exam     Initial Vitals [09/03/18 1558]   BP Pulse Resp Temp SpO2   (!) 169/77 95 (!) 22 98 °F (36.7 °C) (!) 93 %      MAP       --         Physical Exam    Constitutional: She appears well-developed and well-nourished. No distress.   HENT:   Head: Atraumatic.   Eyes: Conjunctivae and EOM are normal. Pupils are equal, round, and reactive to light.   Neck: Normal range of motion. Neck supple.   Cardiovascular: Normal rate, regular rhythm, normal heart sounds and intact distal pulses.   Left lower leg swelling. + calf tenderness   Pulmonary/Chest: Breath sounds normal. No respiratory distress. She has no wheezes. She has no rhonchi. She has no rales.   Abdominal: Soft. Bowel sounds are normal. There is no tenderness. There is no rebound and no guarding.   Neurological: She is alert and oriented to person, place, and time.   Skin: Skin is warm and dry. Rash: bullous pemphigoid.         ED Course   Procedures  Labs Reviewed   CBC W/ AUTO DIFFERENTIAL - Abnormal; Notable for the following components:       Result Value    MCHC 31.4 (*)     MPV 9.1 (*)     Immature Granulocytes 1.1 (*)     Immature Grans (Abs) 0.09 (*)     Lymph% 14.0 (*)     All other components within normal limits   COMPREHENSIVE METABOLIC PANEL - Abnormal; Notable for the following components:    Glucose 130 (*)     Albumin 3.4 (*)     eGFR if  58.5 (*)     eGFR if non  50.8 (*)     All other components within normal limits   URINALYSIS, REFLEX TO URINE CULTURE - Abnormal; Notable for the following components:    Appearance, UA Hazy (*)     Occult Blood UA 1+ (*)     Leukocytes, UA 1+ (*)     All other  components within normal limits   URINALYSIS MICROSCOPIC - Abnormal; Notable for the following components:    RBC, UA 8 (*)     WBC, UA 8 (*)     All other components within normal limits   TROPONIN I   B-TYPE NATRIURETIC PEPTIDE          Imaging Results          CTA Chest Non-Coronary (PE Study) (In process)  Result time 09/03/18 19:40:07               US Lower Extremity Veins Bilateral (Final result)     Abnormal  Result time 09/03/18 18:33:12    Final result by Edwin Fall MD (09/03/18 18:33:12)                 Impression:      Acute appearing deep venous thrombosis within the left femoral and popliteal veins as well as the left peroneal vein.    This report was flagged in Epic as abnormal.  Critical findings were identified at 18:15 and communicated to LOVE Haile at 18:30 by Pritesh Fine MD.    Electronically signed by resident: Pritesh Fine  Date:    09/03/2018  Time:    18:25    Electronically signed by: Edwin Fall MD  Date:    09/03/2018  Time:    18:33             Narrative:    EXAMINATION:  US LOWER EXTREMITY VEINS BILATERAL    CLINICAL HISTORY:  Other specified soft tissue disorders    TECHNIQUE:  Duplex and color flow Doppler and dynamic compression was performed of the bilateral lower extremity veins was performed.    COMPARISON:  No relevant, available prior.    FINDINGS:  Right thigh veins: The common femoral, femoral, popliteal, upper greater saphenous, and deep femoral veins are patent and free of thrombus. The veins are normally compressible and have normal phasic flow and augmentation response.    Right calf veins: The visualized calf veins are patent.    Left thigh veins: The left common femoral vein is patent and compressible.  There is occlusive thrombus throughout the mid and distal left femoral vein, extending into the left popliteal vein.  Thrombus appears slightly expansile, suggesting an acute nature.    Left calf veins: The left posterior tibial and anterior tibial veins are  patent.  There is occlusive thrombus within the left peroneal vein.    Miscellaneous: Mild degree of soft tissue swelling noted.                               X-Ray Chest AP Portable (Final result)  Result time 09/03/18 18:18:41    Final result by Edwin Fall MD (09/03/18 18:18:41)                 Impression:      As above.      Electronically signed by: Edwin Fall MD  Date:    09/03/2018  Time:    18:18             Narrative:    EXAMINATION:  XR CHEST AP PORTABLE    CLINICAL HISTORY:  Chest Pain;    TECHNIQUE:  Single frontal view of the chest was performed.    COMPARISON:  Chest radiograph 06/09/2018    FINDINGS:  Monitoring leads overlie the chest.  Cardiac silhouette is upper limits of normal in size without evidence of failure.  Calcific atherosclerosis with tortuosity of the thoracic aorta.  Pulmonary vasculature and hilar regions are within normal limits.  Bibasilar scattered linear opacities consistent with subsegmental scarring versus atelectasis.  Left costophrenic angle not well visualized which may be sequela of pleural thickening/scarring versus trace pleural effusion.  No large consolidation or pneumothorax.  No acute osseous process seen.  PA and lateral views can be obtained.                                       APC / Resident Notes:   87 year old female presents with shortness of breath and leg swelling.  On exam she is afebrile and nontoxic. Lungs are clear. Abdomen is soft, nontender. Left lower leg swelling with calf tenderness.    DDx includes but is not limited to DVT, PE, pneumonia, CHF, ACS.    Labs are stable. Negative troponin, BNP wnl.    I received a call from Radiology regarding an acute DVT of the left femoral, popliteal, and left peroneal veins.  Lovenox ordered.  I discussed with hospital medicine, patient to be admitted.  PE study is pending.    7:45 PM I received a call from Radiology regarding the patient's PE study.  She has a saddle pulmonary embolus with no radiographic  evidence of right heart strain.  I will reach out to Medicine to update.  Patient is still outside of the department for the imaging studies, have not been able to reassess her since prior to leaving for ultrasound.    8:01 PM Reassessed patient. She is resting comfortably. No tachypnea or increased work of breathing. Denies CP. O2 sat 91% - will place on 2L via NC.         Attending Attestation:     Physician Attestation Statement for NP/PA:   I discussed this assessment and plan of this patient with the NP/PA, but I did not personally examine the patient. The face to face encounter was performed by the NP/PA.                     Clinical Impression:   The primary encounter diagnosis was Acute deep vein thrombosis (DVT) of femoral vein of left lower extremity. A diagnosis of Left leg swelling was also pertinent to this visit.      Disposition:   Disposition: Admitted  Condition: Stable                        Jonh Johnson DO  09/04/18 0834

## 2018-09-04 VITALS
RESPIRATION RATE: 20 BRPM | WEIGHT: 163.38 LBS | BODY MASS INDEX: 30.07 KG/M2 | HEART RATE: 69 BPM | OXYGEN SATURATION: 91 % | HEIGHT: 62 IN | DIASTOLIC BLOOD PRESSURE: 65 MMHG | SYSTOLIC BLOOD PRESSURE: 142 MMHG | TEMPERATURE: 97 F

## 2018-09-04 LAB
ALBUMIN SERPL BCP-MCNC: 3.1 G/DL
ALP SERPL-CCNC: 112 U/L
ALT SERPL W/O P-5'-P-CCNC: 13 U/L
ANION GAP SERPL CALC-SCNC: 9 MMOL/L
AORTIC VALVE STENOSIS: ABNORMAL
APTT BLDCRRT: 70.7 SEC
AST SERPL-CCNC: 17 U/L
BASOPHILS # BLD AUTO: 0.05 K/UL
BASOPHILS NFR BLD: 0.8 %
BILIRUB SERPL-MCNC: 0.7 MG/DL
BUN SERPL-MCNC: 17 MG/DL
CALCIUM SERPL-MCNC: 9.4 MG/DL
CHLORIDE SERPL-SCNC: 108 MMOL/L
CO2 SERPL-SCNC: 24 MMOL/L
CREAT SERPL-MCNC: 1 MG/DL
DIASTOLIC DYSFUNCTION: NO
DIFFERENTIAL METHOD: ABNORMAL
EOSINOPHIL # BLD AUTO: 0.3 K/UL
EOSINOPHIL NFR BLD: 4.5 %
ERYTHROCYTE [DISTWIDTH] IN BLOOD BY AUTOMATED COUNT: 14.4 %
EST. GFR  (AFRICAN AMERICAN): 58.5 ML/MIN/1.73 M^2
EST. GFR  (NON AFRICAN AMERICAN): 50.8 ML/MIN/1.73 M^2
ESTIMATED PA SYSTOLIC PRESSURE: 17.64
ESTIMATED PA SYSTOLIC PRESSURE: 25.09
GLUCOSE SERPL-MCNC: 110 MG/DL
HCT VFR BLD AUTO: 36.4 %
HGB BLD-MCNC: 11.5 G/DL
IMM GRANULOCYTES # BLD AUTO: 0.07 K/UL
IMM GRANULOCYTES NFR BLD AUTO: 1.1 %
LYMPHOCYTES # BLD AUTO: 1.4 K/UL
LYMPHOCYTES NFR BLD: 21.8 %
MAGNESIUM SERPL-MCNC: 1.7 MG/DL
MCH RBC QN AUTO: 28.8 PG
MCHC RBC AUTO-ENTMCNC: 31.6 G/DL
MCV RBC AUTO: 91 FL
MONOCYTES # BLD AUTO: 0.7 K/UL
MONOCYTES NFR BLD: 10.8 %
NEUTROPHILS # BLD AUTO: 3.8 K/UL
NEUTROPHILS NFR BLD: 61 %
NRBC BLD-RTO: 0 /100 WBC
PHOSPHATE SERPL-MCNC: 4.2 MG/DL
PLATELET # BLD AUTO: 159 K/UL
PMV BLD AUTO: 9.6 FL
POTASSIUM SERPL-SCNC: 4.2 MMOL/L
PROT SERPL-MCNC: 6.3 G/DL
RBC # BLD AUTO: 3.99 M/UL
RETIRED EF AND QEF - SEE NOTES: 60 (ref 55–65)
RETIRED EF AND QEF - SEE NOTES: 60 (ref 55–65)
SODIUM SERPL-SCNC: 141 MMOL/L
TRICUSPID VALVE REGURGITATION: ABNORMAL
TROPONIN I SERPL DL<=0.01 NG/ML-MCNC: 0.01 NG/ML
TROPONIN I SERPL DL<=0.01 NG/ML-MCNC: 0.02 NG/ML
WBC # BLD AUTO: 6.19 K/UL

## 2018-09-04 PROCEDURE — 36415 COLL VENOUS BLD VENIPUNCTURE: CPT

## 2018-09-04 PROCEDURE — 84484 ASSAY OF TROPONIN QUANT: CPT

## 2018-09-04 PROCEDURE — 85025 COMPLETE CBC W/AUTO DIFF WBC: CPT

## 2018-09-04 PROCEDURE — G8997 SWALLOW GOAL STATUS: HCPCS | Mod: CH

## 2018-09-04 PROCEDURE — 92610 EVALUATE SWALLOWING FUNCTION: CPT

## 2018-09-04 PROCEDURE — 85730 THROMBOPLASTIN TIME PARTIAL: CPT

## 2018-09-04 PROCEDURE — 25000003 PHARM REV CODE 250: Performed by: STUDENT IN AN ORGANIZED HEALTH CARE EDUCATION/TRAINING PROGRAM

## 2018-09-04 PROCEDURE — 83735 ASSAY OF MAGNESIUM: CPT

## 2018-09-04 PROCEDURE — 93306 TTE W/DOPPLER COMPLETE: CPT

## 2018-09-04 PROCEDURE — 84100 ASSAY OF PHOSPHORUS: CPT

## 2018-09-04 PROCEDURE — 99239 HOSP IP/OBS DSCHRG MGMT >30: CPT | Mod: ,,, | Performed by: HOSPITALIST

## 2018-09-04 PROCEDURE — 80053 COMPREHEN METABOLIC PANEL: CPT

## 2018-09-04 PROCEDURE — 93306 TTE W/DOPPLER COMPLETE: CPT | Mod: 26,,, | Performed by: INTERNAL MEDICINE

## 2018-09-04 PROCEDURE — G8996 SWALLOW CURRENT STATUS: HCPCS | Mod: CI

## 2018-09-04 RX ORDER — TRIMETHOPRIM 100 MG/1
100 TABLET ORAL DAILY
COMMUNITY
End: 2018-10-19

## 2018-09-04 RX ORDER — AMLODIPINE BESYLATE 10 MG/1
10 TABLET ORAL DAILY
Status: DISCONTINUED | OUTPATIENT
Start: 2018-09-04 | End: 2018-09-04 | Stop reason: HOSPADM

## 2018-09-04 RX ADMIN — CETIRIZINE HYDROCHLORIDE 10 MG: 10 TABLET, FILM COATED ORAL at 08:09

## 2018-09-04 RX ADMIN — ATORVASTATIN CALCIUM 40 MG: 20 TABLET, FILM COATED ORAL at 08:09

## 2018-09-04 RX ADMIN — CLOPIDOGREL 75 MG: 75 TABLET, FILM COATED ORAL at 08:09

## 2018-09-04 RX ADMIN — APIXABAN 10 MG: 5 TABLET, FILM COATED ORAL at 11:09

## 2018-09-04 RX ADMIN — AMLODIPINE BESYLATE 10 MG: 10 TABLET ORAL at 08:09

## 2018-09-04 NOTE — HPI
"87F with hx R MCA stroke (06/2018) s/p tPA + thrombectomy with "partial hemorrhagic conversion" and residual L sided weakness brought to ED because of acute onset of L lower extremity swelling, warmth in the setting of 1 week of progressive exertional dyspnea. Pt says she was at PT outpatient when she began to experience dyspnea and anxiety associated with exertion which got better with rest. She says that this morning her L thigh and calf were swollen which prompted her to come to the ED.    In the ED pt got a US LE which showed acute mid-distal femoral vein thrombus extending to L peroneal vein. CTA PE protocol prelim read shows saddle PE. Admitted to Hospital Medicine initially with ICU and Interventional Cardiology consulted after CTA results.    Pt additionally has chronic bullous pemphigoid and HTN  "

## 2018-09-04 NOTE — ASSESSMENT & PLAN NOTE
"US LE Veins Bilat 09/03:  "Left thigh veins: The left common femoral vein is patent and compressible.  There is occlusive thrombus throughout the mid and distal left femoral vein, extending into the left popliteal vein.  Thrombus appears slightly expansile, suggesting an acute nature [...]   Left calf veins: The left posterior tibial and anterior tibial veins are patent.  There is occlusive thrombus within the left peroneal vein."  - 1 day hx leg swelling; pt had prior episode of RLE DVT in 2010 or 2011 during which time she took coumadin from May to October, since then she has not had any DVTs or PEs that she knows of; no recent hx travel  "

## 2018-09-04 NOTE — ASSESSMENT & PLAN NOTE
On amlodipine 5, benicar-HCTZ; holding ARB + thiazide in anticipation of possible procedures; PRN hydralazine SBP >180 mmHg

## 2018-09-04 NOTE — SUBJECTIVE & OBJECTIVE
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/2016       Past Surgical History:   Procedure Laterality Date    BREAST BIOPSY Bilateral     BOTH BENIGN    CHOLECYSTECTOMY      FRACTURE SURGERY Right     ankle    HYSTERECTOMY      TONSILLECTOMY         Review of patient's allergies indicates:   Allergen Reactions    Sulfa (sulfonamide antibiotics) Hives       No current facility-administered medications on file prior to encounter.      Current Outpatient Medications on File Prior to Encounter   Medication Sig    amlodipine (NORVASC) 5 MG tablet Take 5 mg by mouth once daily.    amoxicillin-clavulanate 500-125mg (AUGMENTIN) 500-125 mg Tab     atorvastatin (LIPITOR) 40 MG tablet Take 1 tablet (40 mg total) by mouth once daily.    BENICAR HCT 40-25 mg per tablet     cetirizine (ZYRTEC) 10 MG tablet Take 10 mg by mouth once daily.    clobetasol 0.05% (TEMOVATE) 0.05 % Oint APPLY TO AFFECTED AREA TWICE DAILY. AVOID FACE, ARMPITS, AND GROIN.    clopidogrel (PLAVIX) 75 mg tablet Take 1 tablet (75 mg total) by mouth once daily.    hydrOXYzine HCl (ATARAX) 25 MG tablet TAKE ONE TABLET BY MOUTH AT NIGHT AS NEEDED FOR ITCHING.    indomethacin (INDOCIN) 50 MG capsule Take 50 mg by mouth 2 (two) times daily with meals.    levocetirizine (XYZAL) 5 MG tablet Take 5 mg by mouth every evening.    melatonin 3 mg Tab Take 1 tablet (3 mg total) by mouth every evening.    olmesartan (BENICAR) 40 MG tablet Take 1 tablet (40 mg total) by mouth once daily.    phenazopyridine (PYRIDIUM) 200 MG tablet Take 200 mg by mouth 3 (three) times daily as needed for Pain.    PROTONIX 40 mg GrPS USE 1 PACKET UTD QD    triamcinolone acetonide 0.1% (KENALOG) 0.1 % cream APPLY TO THE AFFECTED AREA TWICE DAILY.    triamcinolone  acetonide 0.1% (KENALOG) 0.1 % ointment      Family History     None        Tobacco Use    Smoking status: Never Smoker   Substance and Sexual Activity    Alcohol use: No    Drug use: Not on file    Sexual activity: Not on file     Review of Systems   Constitutional: Negative for chills, diaphoresis and fever.   Respiratory: Positive for shortness of breath. Negative for apnea, cough, choking, chest tightness, wheezing and stridor.    Cardiovascular: Positive for leg swelling. Negative for chest pain.   Neurological: Negative for dizziness, tremors, seizures, syncope, facial asymmetry, speech difficulty, weakness, light-headedness, numbness and headaches.     Objective:     Vital Signs (Most Recent):  Temp: 98 °F (36.7 °C) (09/03/18 1558)  Pulse: 82 (09/03/18 1958)  Resp: 20 (09/03/18 1958)  BP: (!) 183/77 (09/03/18 1958)  SpO2: (!) 91 % (09/03/18 1958) Vital Signs (24h Range):  Temp:  [98 °F (36.7 °C)] 98 °F (36.7 °C)  Pulse:  [81-95] 82  Resp:  [20-22] 20  SpO2:  [91 %-93 %] 91 %  BP: (169-183)/(77) 183/77     Weight: 74.1 kg (163 lb 5.8 oz)  Body mass index is 29.88 kg/m².    Physical Exam   Constitutional: She appears well-developed.   HENT:   Head: Normocephalic and atraumatic.   Right Ear: External ear normal.   Left Ear: External ear normal.   Nose: Nose normal.   Eyes: EOM are normal. Pupils are equal, round, and reactive to light.   Neck: No tracheal deviation present. No thyromegaly present.   Cardiovascular: Normal rate.   No murmur heard.  Pulmonary/Chest: Effort normal and breath sounds normal.   Abdominal: Soft. There is no tenderness. No hernia.   Musculoskeletal: She exhibits no edema.   Neurological: She displays normal reflexes. No cranial nerve deficit.   Skin: No rash noted.   Psychiatric: She has a normal mood and affect. Judgment normal.   Vitals reviewed.        CRANIAL NERVES     CN III, IV, VI   Pupils are equal, round, and reactive to light.  Extraocular motions are normal.         Significant Labs:   CBC:   Recent Labs   Lab  09/03/18   1653  09/03/18   2047   WBC  7.94  7.60   HGB  12.2  12.3   HCT  38.8  39.4   PLT  175  172     CMP:   Recent Labs   Lab  09/03/18   1653   NA  142   K  4.1   CL  109   CO2  23   GLU  130*   BUN  22   CREATININE  1.0   CALCIUM  9.5   PROT  7.0   ALBUMIN  3.4*   BILITOT  0.6   ALKPHOS  124   AST  17   ALT  14   ANIONGAP  10   EGFRNONAA  50.8*       Significant Imaging: I have reviewed all pertinent imaging results/findings within the past 24 hours.

## 2018-09-04 NOTE — SUBJECTIVE & OBJECTIVE
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/2016       Past Surgical History:   Procedure Laterality Date    BREAST BIOPSY Bilateral     BOTH BENIGN    CHOLECYSTECTOMY      FRACTURE SURGERY Right     ankle    HYSTERECTOMY      TONSILLECTOMY         Review of patient's allergies indicates:   Allergen Reactions    Sulfa (sulfonamide antibiotics) Hives       Family History     None        Tobacco Use    Smoking status: Never Smoker   Substance and Sexual Activity    Alcohol use: No    Drug use: Not on file    Sexual activity: Not on file      Review of Systems   Constitutional: Positive for activity change and fatigue. Negative for chills and fever.   HENT: Negative for sore throat, trouble swallowing and voice change.    Eyes: Negative for visual disturbance.   Respiratory: Positive for shortness of breath. Negative for cough, chest tightness and wheezing.    Cardiovascular: Positive for leg swelling. Negative for chest pain and palpitations.   Gastrointestinal: Negative for abdominal pain, blood in stool, constipation, diarrhea, nausea and vomiting.   Genitourinary: Negative for difficulty urinating, dysuria and hematuria.   Musculoskeletal: Negative for back pain and joint swelling.   Neurological: Positive for weakness. Negative for dizziness, syncope, light-headedness and headaches.   Psychiatric/Behavioral: Negative for confusion. The patient is not nervous/anxious.      Objective:     Vital Signs (Most Recent):  Temp: 98 °F (36.7 °C) (09/03/18 1558)  Pulse: 87 (09/03/18 2203)  Resp: 20 (09/03/18 1958)  BP: (!) 163/72 (09/03/18 2203)  SpO2: (!) 90 % (09/03/18 2203) Vital Signs (24h Range):  Temp:  [98 °F (36.7 °C)] 98 °F (36.7 °C)  Pulse:  [81-95] 87  Resp:  [20-22] 20  SpO2:  [90 %-97 %] 90  %  BP: (142-183)/(64-77) 163/72   Weight: 74.1 kg (163 lb 5.8 oz)  Body mass index is 29.88 kg/m².      Intake/Output Summary (Last 24 hours) at 9/3/2018 2218  Last data filed at 9/3/2018 1701  Gross per 24 hour   Intake --   Output 50 ml   Net -50 ml       Physical Exam   Constitutional: She is oriented to person, place, and time. She appears well-developed and well-nourished. No distress.   HENT:   Head: Normocephalic and atraumatic.   Mouth/Throat: Oropharynx is clear and moist.   Eyes: Conjunctivae and EOM are normal. Pupils are equal, round, and reactive to light.   Neck: Normal range of motion. Neck supple.   Cardiovascular: Normal rate, regular rhythm and normal heart sounds.   Pulmonary/Chest: Breath sounds normal. No respiratory distress.   tachypneic   Abdominal: Soft. Bowel sounds are normal. She exhibits no distension. There is no tenderness.   Musculoskeletal: Normal range of motion. She exhibits no edema.   Some reddish discoloration on lt keg anteriorly, no tenderness.    Neurological: She is alert and oriented to person, place, and time.   Skin: Skin is warm and dry. She is not diaphoretic.   Psychiatric: She has a normal mood and affect. Her behavior is normal. Judgment and thought content normal.       Vents:     Lines/Drains/Airways     Peripheral Intravenous Line                 Peripheral IV - Single Lumen 09/03/18 1653 Left Antecubital less than 1 day         Peripheral IV - Single Lumen 09/03/18 2047 Right Wrist less than 1 day              Significant Labs:    CBC/Anemia Profile:  Recent Labs   Lab  09/03/18 1653 09/03/18 2047   WBC  7.94  7.60   HGB  12.2  12.3   HCT  38.8  39.4   PLT  175  172   MCV  91  91   RDW  14.3  14.3        Chemistries:  Recent Labs   Lab  09/03/18 1653   NA  142   K  4.1   CL  109   CO2  23   BUN  22   CREATININE  1.0   CALCIUM  9.5   ALBUMIN  3.4*   PROT  7.0   BILITOT  0.6   ALKPHOS  124   ALT  14   AST  17       All pertinent labs within the past 24 hours  have been reviewed.    Significant Imaging: I have reviewed and interpreted all pertinent imaging results/findings within the past 24 hours.

## 2018-09-04 NOTE — Clinical Note
Future Appointments   Date Time Provider Department Center   9/10/2018 11:15 AM Juan Ashton MD [facility]   9/11/2018 10:15 AM Lima Gil PT [facility] OP [facility]

## 2018-09-04 NOTE — ASSESSMENT & PLAN NOTE
Low risk saddle PE, no indication for catheter-directed thrombolysis, will benefit from medical therapy.    - continue heparin drip, transition to PO anticoagulant as per primary team  - patient would benefit from blood pressure control  - Reviewed case with interventional staff

## 2018-09-04 NOTE — SUBJECTIVE & OBJECTIVE
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/2016       Past Surgical History:   Procedure Laterality Date    BREAST BIOPSY Bilateral     BOTH BENIGN    CHOLECYSTECTOMY      FRACTURE SURGERY Right     ankle    HYSTERECTOMY      TONSILLECTOMY         Review of patient's allergies indicates:   Allergen Reactions    Sulfa (sulfonamide antibiotics) Hives         (Not in a hospital admission)  Family History     None        Tobacco Use    Smoking status: Never Smoker   Substance and Sexual Activity    Alcohol use: No    Drug use: Not on file    Sexual activity: Not on file     Review of Systems   Constitution: Negative for chills, fever, weakness and weight gain.   HENT: Negative for congestion.    Eyes: Negative for visual disturbance.   Cardiovascular: Negative for chest pain, claudication, dyspnea on exertion, leg swelling, orthopnea, palpitations and syncope.   Respiratory: Positive for shortness of breath. Negative for cough and snoring.    Hematologic/Lymphatic: Does not bruise/bleed easily.   Skin: Negative for rash.   Musculoskeletal: Negative for muscle cramps and myalgias.   Gastrointestinal: Negative for bloating, abdominal pain, constipation, diarrhea and melena.   Genitourinary: Negative for bladder incontinence.   Neurological: Negative for excessive daytime sleepiness and focal weakness.   Psychiatric/Behavioral: Negative for depression and suicidal ideas.     Objective:     Vital Signs (Most Recent):  Temp: 98 °F (36.7 °C) (09/03/18 1558)  Pulse: 87 (09/03/18 2203)  Resp: 20 (09/03/18 1958)  BP: (!) 163/72 (09/03/18 2203)  SpO2: (!) 90 % (09/03/18 2203) Vital Signs (24h Range):  Temp:  [98 °F (36.7 °C)] 98 °F (36.7 °C)  Pulse:  [81-95] 87  Resp:  [20-22] 20  SpO2:  [90 %-97 %] 90 %  BP:  (142-183)/(64-77) 163/72     Weight: 74.1 kg (163 lb 5.8 oz)  Body mass index is 29.88 kg/m².    SpO2: (!) 90 %         Intake/Output Summary (Last 24 hours) at 9/3/2018 2235  Last data filed at 9/3/2018 1701  Gross per 24 hour   Intake --   Output 50 ml   Net -50 ml       Lines/Drains/Airways     Peripheral Intravenous Line                 Peripheral IV - Single Lumen 09/03/18 1653 Left Antecubital less than 1 day         Peripheral IV - Single Lumen 09/03/18 2047 Right Wrist less than 1 day                Physical Exam   Constitutional: She is oriented to person, place, and time. She appears well-developed and well-nourished. No distress.   HENT:   Head: Normocephalic and atraumatic.   Mouth/Throat: Oropharynx is clear and moist.   Eyes: Conjunctivae and EOM are normal. Pupils are equal, round, and reactive to light. No scleral icterus.   Neck: Normal range of motion. Neck supple. No JVD present.   Cardiovascular: Normal rate, regular rhythm and intact distal pulses.   Murmur heard.  Pulses:       Radial pulses are 2+ on the right side, and 2+ on the left side.        Dorsalis pedis pulses are 2+ on the right side, and 2+ on the left side.   Pulmonary/Chest: Effort normal and breath sounds normal. No respiratory distress.   Symmetrical expansion   Abdominal: Soft. Bowel sounds are normal. There is no hepatosplenomegaly. There is no tenderness.   Musculoskeletal: Normal range of motion. She exhibits edema (left lower leg, with mild tenderness).   Neurological: She is alert and oriented to person, place, and time. No cranial nerve deficit.   Skin: Skin is warm and dry. Rash noted. She is not diaphoretic.   Psychiatric: She has a normal mood and affect. Judgment and thought content normal.       Significant Labs:   Recent Lab Results       09/03/18 2047 09/03/18  1701 09/03/18  1653      Immature Granulocytes 0.9  1.1     Immature Grans (Abs) 0.07  Comment:  Mild elevation in immature granulocytes is non specific  and   can be seen in a variety of conditions including stress response,   acute inflammation, trauma and pregnancy. Correlation with other   laboratory and clinical findings is essential.    0.09  Comment:  Mild elevation in immature granulocytes is non specific and   can be seen in a variety of conditions including stress response,   acute inflammation, trauma and pregnancy. Correlation with other   laboratory and clinical findings is essential.       Albumin   3.4     Alkaline Phosphatase   124     ALT   14     Anion Gap   10     Appearance, UA  Hazy      aPTT 25.8  Comment:  aPTT therapeutic range = 39-69 seconds       AST   17     Bacteria, UA  Rare      Baso # 0.05  0.04     Basophil% 0.7  0.5     Bilirubin (UA)  Negative      Total Bilirubin   0.6  Comment:  For infants and newborns, interpretation of results should be based  on gestational age, weight and in agreement with clinical  observations.  Premature Infant recommended reference ranges:  Up to 24 hours.............<8.0 mg/dL  Up to 48 hours............<12.0 mg/dL  3-5 days..................<15.0 mg/dL  6-29 days.................<15.0 mg/dL       BNP   31  Comment:  Values of less than 100 pg/ml are consistent with non-CHF populations.     BUN, Bld   22     Calcium   9.5     Chloride   109     CO2   23     Color, UA  Yellow      Creatinine   1.0     Differential Method Automated  Automated     eGFR if African American   58.5     eGFR if non    50.8  Comment:  Calculation used to obtain the estimated glomerular filtration  rate (eGFR) is the CKD-EPI equation.        Eos # 0.3  0.3     Eosinophil% 3.3  3.3     Glucose   130     Glucose, UA  Negative      Gran # (ANC) 5.3  5.7     Gran% 70.0  71.7     Hematocrit 39.4  38.8     Hemoglobin 12.3  12.2     Coumadin Monitoring INR 0.9  Comment:  Coumadin Therapy:  2.0 - 3.0 for INR for all indicators except mechanical heart valves  and antiphospholipid syndromes which should use 2.5 - 3.5.          Ketones, UA  Negative      Leukocytes, UA  1+      Lymph # 1.2  1.1     Lymph% 15.8  14.0     MCH 28.5  28.6     MCHC 31.2  31.4     MCV 91  91     Microscopic Comment  SEE COMMENT  Comment:  Other formed elements not mentioned in the report are not   present in the microscopic examination.         Mono # 0.7  0.8     Mono% 9.3  9.4     MPV 9.2  9.1     Nitrite, UA  Negative      nRBC 0  0     Occult Blood UA  1+      pH, UA  5.0      Platelets 172  175     Potassium   4.1     Total Protein   7.0     Protein, UA  Negative  Comment:  Recommend a 24 hour urine protein or a urine   protein/creatinine ratio if globulin induced proteinuria is  clinically suspected.        Protime 10.0       RBC 4.31  4.26     RBC, UA  8      RDW 14.3  14.3     Sodium   142     Specific Gravity, UA  1.020      Specimen UA  Urine, Clean Catch      Squam Epithel, UA  3      Troponin I   0.021  Comment:  The reference interval for Troponin I represents the 99th percentile   cutoff   for our facility and is consistent with 3rd generation assay   performance.       Urobilinogen, UA  Negative      WBC, UA  8      WBC 7.60  7.94           Significant Imaging:   See HPI for info    Echo 06/10/18   1 - Moderate left atrial enlargement.     2 - Normal left ventricular systolic function (EF 60-65%).     3 - No wall motion abnormalities.     4 - Normal left ventricular diastolic function.     5 - Normal right ventricular systolic function .     6 - Mild to moderate aortic stenosis, ESTELITA = 1.17 cm2, AVAi = 0.68 cm2/m2, peak velocity = 2.75 m/s, mean gradient = 17 mmHg.     7 - Mild aortic regurgitation.     8 - The estimated PA systolic pressure is 25 mmHg.     9 - Mild mitral regurgitation.

## 2018-09-04 NOTE — ASSESSMENT & PLAN NOTE
"Dx CTA 09/03/2018:  "Saddle pulmonary embolism.  No evidence of right heart strain or pulmonary infarction"  EKG shows NSR without evidence of RH strain  - Hemodynamically stable on admit; mild hypoxia to 91% SpO2 on room air  -- Initially given lovenox 1mg/kg to be given BID after US showed LE DVT  -- After Saddle PE on prelim read, consulted Interventional Cardiology who recommended hep gtt + admission to MICU (given pt's lack of hemodynamic instability, no evidence of EKG changes to indicate R heart strain) + stat echo to further characterize R side of heart  -- MICU consulted  -- hep gtt started  "

## 2018-09-04 NOTE — HPI
"Ms. Sewell is a 87 y.o. F pt with PMHx of HTN, Rt MCA infarct in 6/9/18 s/p TPA and IR thrombectomy with residual left sided weakness, bullous pemphigus, Rt leg DVT (10yrs ago) presenting with a 1 wk hx of worsening dyspnea on exertion. Pt reports she is able to ambulate without assistance. However, her daughter reports that she has been increasingly sedentary during the last couple of weeks. She also notes lt leg swelling. She denies cp, palpitations, presyncope, ams, bloody stools, hematuria. In the ED pt was found to be HD stable witho HR 80s, /77 - 142/69. She was tachypneic on 3L NC with O2 sats at 96%. CTA revealed saddle pulmonary embolism without evidence of rt heart strain or pulmonary infarction. US revealed "acute appearing dvt within the left femoral and popliteal veins as well as the peroneal vein." Trop 0.021, BNP 31. EKG: NSR without acute ischemic changes. She was given lovenox 70mg subQ and then started on a heparin gtt.            "

## 2018-09-04 NOTE — HPI
This is an 87 year old lady with history of HTN, DVT, stroke (R. MCA), bullous pemphigoid who presented to the ED with a chief complaint of shortness of breath that began the week prior during stroke rehab. She had associated left leg swelling and denies symptoms of syncope, chest pain, fevers, chills, cough.    In the ED she was worked up, found to be hypertensive, afebrile and mildly hypoxemic. EKG showed NSR @73bpm, leftward axis, good r-wave progression, and a first degree AV block without any signs of strain or ST-T changes.    CT chest showed a saddle PE without pulmonary infarction or right heart strain. Labs were unremarkable with BNP 31 and BNP 0.02.    Echo done at bedside showed no RV strain, negative Rios's, normal appearing RV/LV function, and a small collapsible IVC, estimated RVSP was 18 based on TR jet.

## 2018-09-04 NOTE — H&P
"[facility] Hospital Medicine  History & Physical    Patient Name: Isa Sewell  MRN: [id number]  Admission Date: 9/3/2018  Attending Physician: Tiffani Palacio*   Primary Care Provider: Josh Oconnor MD    [facility] Medicine Team: [facility] HOSP MED 1 Waylon Lopes MD     Patient information was obtained from patient, relative(s), past medical records and ER records.     Subjective:     Principal Problem:Acute saddle pulmonary embolism without acute cor pulmonale    Chief Complaint:   Chief Complaint   Patient presents with    Shortness of Breath     started about 1 week ago, now legs swollen        HPI: 87F with hx R MCA stroke (06/2018) s/p tPA + thrombectomy with "partial hemorrhagic conversion" and residual L sided weakness brought to ED because of acute onset of L lower extremity swelling, warmth in the setting of 1 week of progressive exertional dyspnea. Pt says she was at PT outpatient when she began to experience dyspnea and anxiety associated with exertion which got better with rest. She says that this morning her L thigh and calf were swollen which prompted her to come to the ED.    In the ED pt got a US LE which showed acute mid-distal femoral vein thrombus extending to L peroneal vein. CTA PE protocol prelim read shows saddle PE. Admitted to Hospital Medicine initially with ICU and Interventional Cardiology consulted after CTA results.    Pt additionally has chronic bullous pemphigoid and HTN    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/2016       Past Surgical History:   Procedure Laterality Date    BREAST BIOPSY Bilateral     BOTH BENIGN    CHOLECYSTECTOMY      FRACTURE SURGERY Right     ankle    HYSTERECTOMY      TONSILLECTOMY         Review of patient's " allergies indicates:   Allergen Reactions    Sulfa (sulfonamide antibiotics) Hives       No current facility-administered medications on file prior to encounter.      Current Outpatient Medications on File Prior to Encounter   Medication Sig    amlodipine (NORVASC) 5 MG tablet Take 5 mg by mouth once daily.    amoxicillin-clavulanate 500-125mg (AUGMENTIN) 500-125 mg Tab     atorvastatin (LIPITOR) 40 MG tablet Take 1 tablet (40 mg total) by mouth once daily.    BENICAR HCT 40-25 mg per tablet     cetirizine (ZYRTEC) 10 MG tablet Take 10 mg by mouth once daily.    clobetasol 0.05% (TEMOVATE) 0.05 % Oint APPLY TO AFFECTED AREA TWICE DAILY. AVOID FACE, ARMPITS, AND GROIN.    clopidogrel (PLAVIX) 75 mg tablet Take 1 tablet (75 mg total) by mouth once daily.    hydrOXYzine HCl (ATARAX) 25 MG tablet TAKE ONE TABLET BY MOUTH AT NIGHT AS NEEDED FOR ITCHING.    indomethacin (INDOCIN) 50 MG capsule Take 50 mg by mouth 2 (two) times daily with meals.    levocetirizine (XYZAL) 5 MG tablet Take 5 mg by mouth every evening.    melatonin 3 mg Tab Take 1 tablet (3 mg total) by mouth every evening.    olmesartan (BENICAR) 40 MG tablet Take 1 tablet (40 mg total) by mouth once daily.    phenazopyridine (PYRIDIUM) 200 MG tablet Take 200 mg by mouth 3 (three) times daily as needed for Pain.    PROTONIX 40 mg GrPS USE 1 PACKET UTD QD    triamcinolone acetonide 0.1% (KENALOG) 0.1 % cream APPLY TO THE AFFECTED AREA TWICE DAILY.    triamcinolone acetonide 0.1% (KENALOG) 0.1 % ointment      Family History     None        Tobacco Use    Smoking status: Never Smoker   Substance and Sexual Activity    Alcohol use: No    Drug use: Not on file    Sexual activity: Not on file     Review of Systems   Constitutional: Negative for chills, diaphoresis and fever.   Respiratory: Positive for shortness of breath. Negative for apnea, cough, choking, chest tightness, wheezing and stridor.    Cardiovascular: Positive for leg swelling.  Negative for chest pain.   Neurological: Negative for dizziness, tremors, seizures, syncope, facial asymmetry, speech difficulty, weakness, light-headedness, numbness and headaches.     Objective:     Vital Signs (Most Recent):  Temp: 98 °F (36.7 °C) (09/03/18 1558)  Pulse: 82 (09/03/18 1958)  Resp: 20 (09/03/18 1958)  BP: (!) 183/77 (09/03/18 1958)  SpO2: (!) 91 % (09/03/18 1958) Vital Signs (24h Range):  Temp:  [98 °F (36.7 °C)] 98 °F (36.7 °C)  Pulse:  [81-95] 82  Resp:  [20-22] 20  SpO2:  [91 %-93 %] 91 %  BP: (169-183)/(77) 183/77     Weight: 74.1 kg (163 lb 5.8 oz)  Body mass index is 29.88 kg/m².    Physical Exam   Constitutional: She appears well-developed.   HENT:   Head: Normocephalic and atraumatic.   Right Ear: External ear normal.   Left Ear: External ear normal.   Nose: Nose normal.   Eyes: EOM are normal. Pupils are equal, round, and reactive to light.   Neck: No tracheal deviation present. No thyromegaly present.   Cardiovascular: Normal rate.   No murmur heard.  Pulmonary/Chest: Effort normal and breath sounds normal.   Abdominal: Soft. There is no tenderness. No hernia.   Musculoskeletal: She exhibits no edema.   Neurological: She displays normal reflexes. No cranial nerve deficit.   Skin: No rash noted.   Psychiatric: She has a normal mood and affect. Judgment normal.   Vitals reviewed.        CRANIAL NERVES     CN III, IV, VI   Pupils are equal, round, and reactive to light.  Extraocular motions are normal.        Significant Labs:   CBC:   Recent Labs   Lab  09/03/18   1653  09/03/18   2047   WBC  7.94  7.60   HGB  12.2  12.3   HCT  38.8  39.4   PLT  175  172     CMP:   Recent Labs   Lab  09/03/18   1653   NA  142   K  4.1   CL  109   CO2  23   GLU  130*   BUN  22   CREATININE  1.0   CALCIUM  9.5   PROT  7.0   ALBUMIN  3.4*   BILITOT  0.6   ALKPHOS  124   AST  17   ALT  14   ANIONGAP  10   EGFRNONAA  50.8*       Significant Imaging: I have reviewed all pertinent imaging results/findings within  "the past 24 hours.    Assessment/Plan:     * Acute saddle pulmonary embolism without acute cor pulmonale    Dx CTA 09/03/2018:  "Saddle pulmonary embolism.  No evidence of right heart strain or pulmonary infarction"  EKG shows NSR without evidence of RH strain  - Hemodynamically stable on admit; mild hypoxia to 91% SpO2 on room air  -- Initially given lovenox 1mg/kg to be given BID after US showed LE DVT  -- After Saddle PE on prelim read, consulted Interventional Cardiology who recommended hep gtt + admission to MICU (given pt's lack of hemodynamic instability, no evidence of EKG changes to indicate R heart strain) + stat echo to further characterize R side of heart  -- MICU consulted  -- hep gtt started        Acute deep vein thrombosis (DVT) of femoral vein of left lower extremity    US LE Veins Bilat 09/03:  "Left thigh veins: The left common femoral vein is patent and compressible.  There is occlusive thrombus throughout the mid and distal left femoral vein, extending into the left popliteal vein.  Thrombus appears slightly expansile, suggesting an acute nature [...]   Left calf veins: The left posterior tibial and anterior tibial veins are patent.  There is occlusive thrombus within the left peroneal vein."  - 1 day hx leg swelling; pt had prior episode of RLE DVT in 2010 or 2011 during which time she took coumadin from May to October, since then she has not had any DVTs or PEs that she knows of; no recent hx travel        Hx of ischemic right MCA stroke    06/2018, s/p tPA + thrombectomy by IR with residual L sided weakness. On plavix, atorvastatin; per family not on ASA as pt says she was advised she did not need to be on both        Benign essential HTN    On amlodipine 5, benicar-HCTZ; holding ARB + thiazide in anticipation of possible procedures; PRN hydralazine SBP >180 mmHg        Impaired functional mobility, balance, gait, and endurance    PT/OT ordered          Dysphagia    SLP Eval ordered        "     VTE Risk Mitigation (From admission, onward)        Ordered     IP VTE HIGH RISK PATIENT  Once      09/03/18 2158     heparin 25,000 units in dextrose 5% 250 mL (100 units/mL) infusion HIGH INTENSITY nomogram - [facility]  Continuous      09/03/18 2020     heparin 25,000 units in dextrose 5% (100 units/ml) IV bolus from bag - ADDITIONAL PRN BOLUS - 60 units/kg  As needed (PRN)      09/03/18 2020     heparin 25,000 units in dextrose 5% (100 units/ml) IV bolus from bag - ADDITIONAL PRN BOLUS - 30 units/kg  As needed (PRN)      09/03/18 2020             Waylon Lopes MD  Department of Hospital Medicine   [facility]                    09/04/2018                             STAFF PHYSICIAN NOTE                                   Attending Attestation for Rounds with Resident  I have reviewed and concur with the resident's history, physical, assessment, and plan.  I have personally interviewed and examined the patient at bedside and agree with the resident's findings.                                  ________________________________________                                     REASON FOR ADMISSION:     Patient is 87 y.o.female    Body mass index is 29.88 kg/m².,  Acute saddle pulmonary embolism without acute cor pulmonale

## 2018-09-04 NOTE — PLAN OF CARE
Problem: Patient Care Overview  Goal: Plan of Care Review  Outcome: Ongoing (interventions implemented as appropriate)  Patient remains free from falls and injuries through out shift. Patient AAO and VSS. ICU and Interventional Cardiology consulted after CTA results, which should saddle PE. Pt on hep gtt. NPO due to possible procedures today.  Patient denies chest pain and SOB. Patient's family at bedside. Plan of care reviewed with patient. Patient verbalizes understanding of plan.  Will continue to monitor.

## 2018-09-04 NOTE — CONSULTS
"[facility]  Critical Care Medicine  Consult Note    Patient Name: Isa Sewell  MRN: [id number]  Admission Date: 9/3/2018  Hospital Length of Stay: 0 days  Code Status: Full Code  Attending Physician: Tiffani Palacio*   Primary Care Provider: Josh Oconnor MD   Principal Problem: Acute saddle pulmonary embolism without acute cor pulmonale    Consults  Subjective:     HPI:  Ms. Sewell is a 87 y.o. F pt with PMHx of HTN, Rt MCA infarct in 6/9/18 s/p TPA and IR thrombectomy with residual left sided weakness, bullous pemphigus, Rt leg DVT (10yrs ago) presenting with a 1 wk hx of worsening dyspnea on exertion. Pt reports she is able to ambulate without assistance. However, her daughter reports that she has been increasingly sedentary during the last couple of weeks. She also notes lt leg swelling. She denies cp, palpitations, presyncope, ams, bloody stools, hematuria. In the ED pt was found to be HD stable witho HR 80s, /77 - 142/69. She was tachypneic on 3L NC with O2 sats at 96%. CTA revealed saddle pulmonary embolism without evidence of rt heart strain or pulmonary infarction. US revealed "acute appearing dvt within the left femoral and popliteal veins as well as the peroneal vein." Trop 0.021, BNP 31. EKG: NSR without acute ischemic changes. She was given lovenox 70mg subQ and then started on a heparin gtt.              Hospital/ICU Course:  No notes on file    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/2016       Past Surgical History:   Procedure Laterality Date    BREAST BIOPSY Bilateral     BOTH BENIGN    CHOLECYSTECTOMY      FRACTURE SURGERY Right     ankle    HYSTERECTOMY      TONSILLECTOMY         Review of patient's allergies indicates:   Allergen Reactions    " Sulfa (sulfonamide antibiotics) Hives       Family History     None        Tobacco Use    Smoking status: Never Smoker   Substance and Sexual Activity    Alcohol use: No    Drug use: Not on file    Sexual activity: Not on file      Review of Systems   Constitutional: Positive for activity change and fatigue. Negative for chills and fever.   HENT: Negative for sore throat, trouble swallowing and voice change.    Eyes: Negative for visual disturbance.   Respiratory: Positive for shortness of breath. Negative for cough, chest tightness and wheezing.    Cardiovascular: Positive for leg swelling. Negative for chest pain and palpitations.   Gastrointestinal: Negative for abdominal pain, blood in stool, constipation, diarrhea, nausea and vomiting.   Genitourinary: Negative for difficulty urinating, dysuria and hematuria.   Musculoskeletal: Negative for back pain and joint swelling.   Neurological: Positive for weakness. Negative for dizziness, syncope, light-headedness and headaches.   Psychiatric/Behavioral: Negative for confusion. The patient is not nervous/anxious.      Objective:     Vital Signs (Most Recent):  Temp: 98 °F (36.7 °C) (09/03/18 1558)  Pulse: 87 (09/03/18 2203)  Resp: 20 (09/03/18 1958)  BP: (!) 163/72 (09/03/18 2203)  SpO2: (!) 90 % (09/03/18 2203) Vital Signs (24h Range):  Temp:  [98 °F (36.7 °C)] 98 °F (36.7 °C)  Pulse:  [81-95] 87  Resp:  [20-22] 20  SpO2:  [90 %-97 %] 90 %  BP: (142-183)/(64-77) 163/72   Weight: 74.1 kg (163 lb 5.8 oz)  Body mass index is 29.88 kg/m².      Intake/Output Summary (Last 24 hours) at 9/3/2018 2218  Last data filed at 9/3/2018 1701  Gross per 24 hour   Intake --   Output 50 ml   Net -50 ml       Physical Exam   Constitutional: She is oriented to person, place, and time. She appears well-developed and well-nourished. No distress.   HENT:   Head: Normocephalic and atraumatic.   Mouth/Throat: Oropharynx is clear and moist.   Eyes: Conjunctivae and EOM are normal. Pupils  are equal, round, and reactive to light.   Neck: Normal range of motion. Neck supple.   Cardiovascular: Normal rate, regular rhythm and normal heart sounds.   Pulmonary/Chest: Breath sounds normal. No respiratory distress.   tachypneic   Abdominal: Soft. Bowel sounds are normal. She exhibits no distension. There is no tenderness.   Musculoskeletal: Normal range of motion. She exhibits no edema.   Some reddish discoloration on lt keg anteriorly, no tenderness.    Neurological: She is alert and oriented to person, place, and time.   Skin: Skin is warm and dry. She is not diaphoretic.   Psychiatric: She has a normal mood and affect. Her behavior is normal. Judgment and thought content normal.       Vents:     Lines/Drains/Airways     Peripheral Intravenous Line                 Peripheral IV - Single Lumen 09/03/18 1653 Left Antecubital less than 1 day         Peripheral IV - Single Lumen 09/03/18 2047 Right Wrist less than 1 day              Significant Labs:    CBC/Anemia Profile:  Recent Labs   Lab  09/03/18   1653  09/03/18   2047   WBC  7.94  7.60   HGB  12.2  12.3   HCT  38.8  39.4   PLT  175  172   MCV  91  91   RDW  14.3  14.3        Chemistries:  Recent Labs   Lab  09/03/18   1653   NA  142   K  4.1   CL  109   CO2  23   BUN  22   CREATININE  1.0   CALCIUM  9.5   ALBUMIN  3.4*   PROT  7.0   BILITOT  0.6   ALKPHOS  124   ALT  14   AST  17       All pertinent labs within the past 24 hours have been reviewed.    Significant Imaging: I have reviewed and interpreted all pertinent imaging results/findings within the past 24 hours.    Assessment/Plan:     Hematology   * Acute saddle pulmonary embolism without acute cor pulmonale    --submassive PE; no signs of hemodynamic instability or Rt heart strain on CTA and on bedside echo performed by cards   --no indication for catheter directed TPA at this time  --likely due to immobility  --O2 sats at goal on 3L NC    Recommendations:  --pt currently stable for step down  unit  --agree with heparin gtt   --monitor VS and daily CBC    --will continue to monitor from a distance.   Please call with any questions.         Acute deep vein thrombosis (DVT) of femoral vein of left lower extremity    --likely due to immobility  --has hx of rt leg dvt 10 yrs ago  --agree with heparin gtt            Critical Care Daily Checklist:             Critical care was time spent personally by me on the following activities: development of treatment plan with patient or surrogate and bedside caregivers, discussions with consultants, evaluation of patient's response to treatment, examination of patient, ordering and performing treatments and interventions, ordering and review of laboratory studies, ordering and review of radiographic studies, pulse oximetry, re-evaluation of patient's condition. This critical care time did not overlap with that of any other provider or involve time for any procedures.    Thank you for your consult. I will sign off. Please contact us if you have any additional questions.   Case discussed with  pulmonary critical care fellow Dr. Johnson.  Misty Matthew MD  Critical Care Medicine  [facility]

## 2018-09-04 NOTE — PT/OT/SLP EVAL
Speech Language Pathology Evaluation  Bedside Swallow    Patient Name:  Isa Sewell   MRN:  [id number]  Admitting Diagnosis: Acute saddle pulmonary embolism without acute cor pulmonale.     Recommendations:                 General Recommendations:  Dysphagia therapy  Diet recommendations:  Regular, Thin   Aspiration Precautions: 1 bite/sip at a time, Alternating bites/sips, Avoid talking while eating, Check for pocketing/oral residue, Eliminate distractions, Frequent oral care, Meds whole 1 at a time, No straws, Small bites/sips and Strict aspiration precautions. Please Continue to monitor for pocketing and signs and symptoms of aspiration and discontinue oral feeding should you notice any of the following: watery eyes, reddened facial area, wet vocal quality, increased work of breathing, change in respiratory status, increased congestion, coughing, fever, and.or increased confusion.   General Precautions: Standard, aspiration, other (see comments)(Dysarthria)  Communication strategies:  go to room if call light pushed    History:     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/2016       Past Surgical History:   Procedure Laterality Date    BREAST BIOPSY Bilateral     BOTH BENIGN    CHOLECYSTECTOMY      FRACTURE SURGERY Right     ankle    HYSTERECTOMY      TONSILLECTOMY           Modified Barium Swallow: Yes, previously completed 6/13/2018.     Chest X-Rays: 9/03/2018: Monitoring leads overlie the chest.  Cardiac silhouette is upper limits of normal in size without evidence of failure.  Calcific atherosclerosis with tortuosity of the thoracic aorta.  Pulmonary vasculature and hilar regions are within normal limits.  Bibasilar scattered linear opacities consistent with subsegmental scarring versus  "atelectasis.  Left costophrenic angle not well visualized which may be sequela of pleural thickening/scarring versus trace pleural effusion.  No large consolidation or pneumothorax.  No acute osseous process seen.  PA and lateral views can be obtained.    Prior diet: Pt and family report Pt back to regular diet and thin liquids    Subjective     SLP reviewed Pt with nurse, nurse reported Pt back from procedure and ok for assessment  Pt presents calm and cooperative  She explains, "I've had a dry cough for ever"  Daughter explains, "She's back to eating everything and just took her medications fine whole"    Pain/Comfort:  · Pain Rating 1: 0/10    Objective:   Pt found awake in bed, with nasal canula.  HOB elevated. Spouse at bedside with call light in lap. Daughter and Son-in-Law in room.    Oral Musculature Evaluation  · Oral Musculature: left weakness(h/o CVA 6/2018)  · Dentition: present and adequate  · Secretion Management: adequate(pt with occasional L oral holding of secretions, aware and monitors I'ly)  · Mucosal Quality: adequate  · Mandibular Strength and Mobility: WNL  · Oral Labial Strength and Mobility: impaired retraction  · Lingual Strength and Mobility: WNL  · Volitional Cough: elicited  · Volitional Swallow: elicited  · Voice Prior to PO Intake: clear, adequate intensity     Bedside Swallow Eval:   Consistencies Assessed:  · Thin liquids cup edge sips x5, continue cup edge sip x1  · Puree tsp biets x3  · Solids bites of veronica cracker x4     Oral Phase:   · Prolonged mastication    Pharyngeal Phase:   · Pt with delayed cough x1 following bite of solid   · no overt clinical signs/symptoms of aspiration with thin liquids or puree textures    Compensatory Strategies  · Single bites/sips    Treatment: Pt presents with dry cough prior to presentations of PO trials. Pt with delayed, dry cough following bite of solid. Pt and Family educated on SLP role, S/S aspiration, aspiration precautions and diet " recommendations. Pt and family verbalized understanding of SLP recommendations and education. Pt provided toothbrush and toothettes for oral care following all PO intake. No additional questions noted. Findings reviewed with nurse following session. MD team paged to review findings/recommendations.     Assessment:     Isa Sewell is a 87 y.o. female with an SLP diagnosis of Oral Dysphagia.  She presents with mildly prolonged mastication of solids.  She uses liquid wash and lingual sweeps Independently to monitor for L pocketing. She would benefit from strict oral care following all PO 2/2 h/o pocketing.       Goals:   Multidisciplinary Problems     SLP Goals        Problem: SLP Goal    Goal Priority Disciplines Outcome   SLP Goal     SLP    Description:  Speech Language Pathology Goals  Goals expected to be met by 9/11/2018  1. Pt will tolerate regular diet with thin liquids w/o overt S/S aspiration   2. Educate Pt and family on S/S aspiration and aspiration precautions                     Plan:     · Patient to be seen:  3 x/week   · Plan of Care expires:  10/04/18  · Plan of Care reviewed with:  patient, spouse, daughter   · SLP Follow-Up:  Yes       Discharge recommendations: continue outpatient speech therapy   Barriers to Discharge:  None    Time Tracking:     SLP Treatment Date:   09/04/18  Speech Start Time:  1103  Speech Stop Time:  1118     Speech Total Time (min):  15 min    Billable Minutes: Eval Swallow and Oral Function 15    JONH Pedroza, [facility]-SLP  Speech-Language Pathology  Pager: [phone]      09/04/2018

## 2018-09-04 NOTE — ASSESSMENT & PLAN NOTE
06/2018, s/p tPA + thrombectomy by IR with residual L sided weakness. On plavix, atorvastatin; per family not on ASA as pt says she was advised she did not need to be on both

## 2018-09-04 NOTE — ASSESSMENT & PLAN NOTE
--submassive PE; no signs of hemodynamic instability or Rt heart strain on CTA and on bedside echo performed by cards   --no indication for catheter directed TPA at this time  --likely due to immobility  --O2 sats at goal on 3L NC    Recommendations:  --pt currently stable for step down unit  --agree with heparin gtt   --monitor VS and daily CBC    --will continue to monitor from a distance.   Please call with any questions.

## 2018-09-04 NOTE — ED NOTES
Spoke with IM 1 and ED PA who advised pt has a PE. Doctor states he would like to change room assignment and will place order.

## 2018-09-05 NOTE — HOSPITAL COURSE
87 y.o. lady came with SOB and Swollen Left leg, CTA PE - Saddle PE along with US LE showing Lt leg DVT secondary to immobility.  As it was submassive PE; no signs of hemodynamic instability or Rt heart strainon CTA and 2D ECHO, she was not a candidate for tpa. She was started on heparin drip transitioned to Apixaban. She will continue this at home, will follow up with PCP for refills. Holter monitor showed no arrhythmias. She is discharged, continue rest of medications with regular diet and activity as tolerated. She will also follow up with her scheduled Neurology appointment.

## 2018-09-05 NOTE — DISCHARGE SUMMARY
"[facility] Hospital Medicine  Discharge Summary      Patient Name: Isa Sewell  MRN: [id number]  Admission Date: 9/3/2018  Hospital Length of Stay: 1 days  Discharge Date and Time:  09/04/2018 7:19 PM  Attending Physician: No att. providers found   Discharging Provider: Sanchez Maurer MD  Primary Care Provider: Josh Oconnor MD  Hospital Medicine Team: [facility]OS[facility] MED 1 Sanchez Maurer MD    HPI:   87F with hx R MCA stroke (06/2018) s/p tPA + thrombectomy with "partial hemorrhagic conversion" and residual L sided weakness brought to ED because of acute onset of L lower extremity swelling, warmth in the setting of 1 week of progressive exertional dyspnea. Pt says she was at PT outpatient when she began to experience dyspnea and anxiety associated with exertion which got better with rest. She says that this morning her L thigh and calf were swollen which prompted her to come to the ED.    In the ED pt got a US LE which showed acute mid-distal femoral vein thrombus extending to L peroneal vein. CTA PE protocol prelim read shows saddle PE. Admitted to Hospital Medicine initially with ICU and Interventional Cardiology consulted after CTA results.    Pt additionally has chronic bullous pemphigoid and HTN    * No surgery found *      Hospital Course:   87 y.o. lady came with SOB and Swollen Left leg, CTA PE - Saddle PE along with US LE showing Lt leg DVT secondary to immobility.  As it was submassive PE; no signs of hemodynamic instability or Rt heart strainon CTA and 2D ECHO, she was not a candidate for tpa. She was started on heparin drip transitioned to Apixaban. She will continue this at home, will follow up with PCP for refills. Holter monitor showed no arrhythmias. She is discharged, continue rest of medications with regular diet and activity as tolerated. She will also follow up with her scheduled Neurology appointment.      Review of Systems   Constitutional: Negative for chills, " "diaphoresis and fever.   Respiratory: Positive for shortness of breath. Negative for apnea, cough, choking, chest tightness, wheezing and stridor.    Cardiovascular: Positive for leg swelling. Negative for chest pain.   Neurological: Negative for dizziness, tremors, seizures, syncope, facial asymmetry, speech difficulty, weakness, light-headedness, numbness and headaches.      Objective:      BP (!) 142/65 (Patient Position: Lying)   Pulse 69   Temp 96.7 °F (35.9 °C)   Resp 20   Ht 5' 2" (1.575 m)   Wt 74.1 kg (163 lb 5.8 oz)   SpO2 (!) 91%   Breastfeeding? No   BMI 29.88 kg/m²        Physical Exam   Constitutional: She appears well-developed.   HENT:   Head: Normocephalic and atraumatic.   Right Ear: External ear normal.   Left Ear: External ear normal.   Nose: Nose normal.   Eyes: EOM are normal. Pupils are equal, round, and reactive to light.   Neck: No tracheal deviation present. No thyromegaly present.   Cardiovascular: Normal rate.   No murmur heard.  Pulmonary/Chest: Effort normal and breath sounds normal.   Abdominal: Soft. There is no tenderness. No hernia.   Musculoskeletal: She exhibits no edema.   Neurological: She displays normal reflexes. No cranial nerve deficit.   Skin: No rash noted.   Psychiatric: She has a normal mood and affect. Judgment normal.   Vitals reviewed.           CRANIAL NERVES      CN III, IV, VI   Pupils are equal, round, and reactive to light.  Extraocular motions are normal.           Consults:   Consults (From admission, onward)        Status Ordering Provider     Inpatient consult to Critical Care Medicine  Once     Provider:  (Not yet assigned)    YANIRA Mederos     Inpatient consult to Interventional Cardiology  Once     Provider:  (Not yet assigned)    YANIRA Mederos          No new Assessment & Plan notes have been filed under this hospital service since the last note was generated.  Service: Hospital Medicine    Final Active Diagnoses:    Diagnosis " Date Noted POA    PRINCIPAL PROBLEM:  Acute saddle pulmonary embolism without acute cor pulmonale [I26.92] 09/03/2018 Yes    Acute deep vein thrombosis (DVT) of femoral vein of left lower extremity [I82.412] 09/03/2018 Yes    Hx of ischemic right MCA stroke [Z86.73] 09/03/2018 Not Applicable    Impaired functional mobility, balance, gait, and endurance [Z74.09] 08/07/2018 Yes    Hyperlipidemia [E78.5] 06/14/2018 Yes    Dysphagia [R13.10] 06/12/2018 Yes    Benign essential HTN [I10] 06/09/2018 Yes      Problems Resolved During this Admission:       Discharged Condition: fair    Disposition: Home or Self Care    Follow Up:  Follow-up Information     Juan Ashton MD On 9/10/2018.    Specialty:  Neurosurgery  Why:  At 11:15 AM, Hospital Follow-up with neurosurgery  Contact information:  [address]  [facility] [address]  [phone]             Lima Gil PT.    Specialty:  Physical Therapy  Why:  Out-patient Rehab  Contact information:  [facility] - Neurology.    Specialty:  Neurology  Contact information:  [address]  [facility] [address] [phone]  Additional information:  7th Floor - Clinic Alden Oconnor MD.    Specialty:  Internal Medicine  Contact information:  [address] [place] [address]  [phone]                 Patient Instructions:   No discharge procedures on file.    Significant Diagnostic Studies: Labs:   CMP   Recent Labs   Lab  09/03/18 1653 09/04/18   0534   NA  142  141   K  4.1  4.2   CL  109  108   CO2  23  24   GLU  130*  110   BUN  22  17   CREATININE  1.0  1.0   CALCIUM  9.5  9.4   PROT  7.0  6.3   ALBUMIN  3.4*  3.1*   BILITOT  0.6  0.7   ALKPHOS  124  112   AST  17  17   ALT  14  13   ANIONGAP  10  9   ESTGFRAFRICA  58.5*  58.5*   EGFRNONAA  50.8*  50.8*    and CBC   Recent Labs   Lab  09/03/18   1653  09/03/18   2047  09/04/18   0534   WBC  7.94  7.60  6.19   HGB  12.2  12.3  11.5*    HCT  38.8  39.4  36.4*   PLT  175  172  159       Pending Diagnostic Studies:     None         Medications:  Reconciled Home Medications:      Medication List      START taking these medications    ELIQUIS 5 mg (74 tabs) Dspk  Generic drug:  apixaban  For the first 7 days take two 5 mg tablets twice daily.  After 7 days take one 5 mg tablet twice daily.        CHANGE how you take these medications    triamcinolone acetonide 0.1% 0.1 % cream  Commonly known as:  KENALOG  APPLY TO THE AFFECTED AREA TWICE DAILY.  What changed:  Another medication with the same name was removed. Continue taking this medication, and follow the directions you see here.        CONTINUE taking these medications    amLODIPine 5 MG tablet  Commonly known as:  NORVASC  Take 5 mg by mouth once daily.     atorvastatin 40 MG tablet  Commonly known as:  LIPITOR  Take 1 tablet (40 mg total) by mouth once daily.     cetirizine 10 MG tablet  Commonly known as:  ZYRTEC  Take 10 mg by mouth once daily.     clobetasol 0.05% 0.05 % Oint  Commonly known as:  TEMOVATE  APPLY TO AFFECTED AREA TWICE DAILY. AVOID FACE, ARMPITS, AND GROIN.     clopidogrel 75 mg tablet  Commonly known as:  PLAVIX  Take 1 tablet (75 mg total) by mouth once daily.     hydrOXYzine HCl 25 MG tablet  Commonly known as:  ATARAX  TAKE ONE TABLET BY MOUTH AT NIGHT AS NEEDED FOR ITCHING.     levocetirizine 5 MG tablet  Commonly known as:  XYZAL  Take 5 mg by mouth every evening.     melatonin 3 mg Tab  Take 1 tablet (3 mg total) by mouth every evening.     olmesartan 40 MG tablet  Commonly known as:  BENICAR  Take 1 tablet (40 mg total) by mouth once daily.     phenazopyridine 200 MG tablet  Commonly known as:  PYRIDIUM  Take 200 mg by mouth 3 (three) times daily as needed for Pain.     PROTONIX 40 mg Grps  Generic drug:  pantoprazole  Use as directed. Take 1 packet by mouth once daily        STOP taking these medications    amoxicillin-clavulanate 500-125mg 500-125 mg Tab  Commonly  known as:  AUGMENTIN     BENICAR HCT 40-25 mg per tablet  Generic drug:  olmesartan-hydrochlorothiazide     indomethacin 50 MG capsule  Commonly known as:  INDOCIN        ASK your doctor about these medications    trimethoprim 100 mg Tab  Commonly known as:  TRIMPEX  Take 100 mg by mouth once daily.            Indwelling Lines/Drains at time of discharge:   Lines/Drains/Airways          None                 Sanchez Maurer MD  Department of Hospital Medicine  [facility]                    09/05/2018                             STAFF PHYSICIAN NOTE                                   Attending Attestation for Rounds with Resident  I have reviewed and concur with the resident's history, physical, assessment, and plan.  I have personally interviewed and examined the patient at bedside and agree with the resident's findings.                                  ________________________________________                                     REASON FOR ADMISSION:     Patient is 87 y.o.female    Body mass index is 29.88 kg/m².,  Acute saddle pulmonary embolism without acute cor pulmonale

## 2018-09-10 ENCOUNTER — OFFICE VISIT (OUTPATIENT)
Dept: NEUROSURGERY | Facility: CLINIC | Age: 83
End: 2018-09-10
Payer: MEDICARE

## 2018-09-10 VITALS
BODY MASS INDEX: 29.83 KG/M2 | TEMPERATURE: 97 F | DIASTOLIC BLOOD PRESSURE: 70 MMHG | WEIGHT: 163.13 LBS | SYSTOLIC BLOOD PRESSURE: 166 MMHG | HEART RATE: 64 BPM

## 2018-09-10 DIAGNOSIS — I63.411 EMBOLIC STROKE INVOLVING RIGHT MIDDLE CEREBRAL ARTERY: Primary | ICD-10-CM

## 2018-09-10 DIAGNOSIS — R53.1 LEFT-SIDED WEAKNESS: ICD-10-CM

## 2018-09-10 PROCEDURE — 99213 OFFICE O/P EST LOW 20 MIN: CPT | Mod: PBBFAC | Performed by: NEUROLOGICAL SURGERY

## 2018-09-10 PROCEDURE — 99213 OFFICE O/P EST LOW 20 MIN: CPT | Mod: S$PBB,,, | Performed by: NEUROLOGICAL SURGERY

## 2018-09-10 PROCEDURE — 99999 PR PBB SHADOW E&M-EST. PATIENT-LVL III: CPT | Mod: PBBFAC,,, | Performed by: NEUROLOGICAL SURGERY

## 2018-09-10 RX ORDER — FLUCONAZOLE 200 MG/1
TABLET ORAL
Refills: 0 | COMMUNITY
Start: 2018-07-25 | End: 2018-10-19

## 2018-09-10 RX ORDER — TRIAMCINOLONE ACETONIDE 1 MG/G
OINTMENT TOPICAL
Refills: 3 | COMMUNITY
Start: 2018-09-05

## 2018-09-10 NOTE — PROGRESS NOTES
History & Physical    I, Ad Worthington, attest that this documentation has been prepared under the direction and in the presence of Juan Ashton MD.    09/12/2018    Chief Complaint   Patient presents with    Follow-up       History of Present Illness:  Isa Sewell is a 87 y.o. 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 Patient presents for follow up evaluation.     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  Patient has been placed on  Eliquis. Otherwise patient is back to her neurologic baseline except for left-sided   Mild hemiparesis,, per family. She is able to walk independently. She is now working with outpatient Physical Therapy. She is compliant with Plavix, but is now also taking Eliquis for her PE.       Review of patient's allergies indicates:   Allergen Reactions    Sulfa (sulfonamide antibiotics) Hives       Current Outpatient Medications   Medication Sig Dispense Refill    amlodipine (NORVASC) 5 MG tablet Take 5 mg by mouth once daily.      apixaban (ELIQUIS) 5 mg (74 tabs) DsPk For the first 7 days take two 5 mg tablets twice daily.  After 7 days take one 5 mg tablet twice daily. 74 tablet 0    atorvastatin (LIPITOR) 40 MG tablet Take 1 tablet (40 mg total) by mouth once daily. 90 tablet 3    cetirizine (ZYRTEC) 10 MG tablet Take 10 mg by mouth once daily.      clobetasol 0.05% (TEMOVATE) 0.05 % Oint APPLY TO AFFECTED AREA TWICE DAILY. AVOID FACE, ARMPITS, AND GROIN. 60 g 2    clopidogrel (PLAVIX) 75 mg tablet Take 1 tablet (75 mg total) by mouth once daily. 30 tablet 11    hydrOXYzine HCl (ATARAX) 25 MG tablet TAKE ONE TABLET BY MOUTH AT NIGHT AS NEEDED FOR ITCHING. 30 tablet 1    levocetirizine (XYZAL) 5 MG tablet Take 5 mg by mouth every evening.      melatonin 3 mg Tab Take 1  tablet (3 mg total) by mouth every evening. 90 tablet 3    olmesartan (BENICAR) 40 MG tablet Take 1 tablet (40 mg total) by mouth once daily. 90 tablet 3    phenazopyridine (PYRIDIUM) 200 MG tablet Take 200 mg by mouth 3 (three) times daily as needed for Pain.      PROTONIX 40 mg GrPS Use as directed. Take 1 packet by mouth once daily  0    trimethoprim (TRIMPEX) 100 mg Tab Take 100 mg by mouth once daily.      fluconazole (DIFLUCAN) 200 MG Tab   0    triamcinolone acetonide 0.1% (KENALOG) 0.1 % ointment   3     No current facility-administered medications for this visit.        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/2016       Past Surgical History:   Procedure Laterality Date    BREAST BIOPSY Bilateral     BOTH BENIGN    CHOLECYSTECTOMY      FRACTURE SURGERY Right     ankle    HYSTERECTOMY      TONSILLECTOMY         Family History   Problem Relation Age of Onset    Melanoma Neg Hx     Psoriasis Neg Hx     Lupus Neg Hx     Eczema Neg Hx        Social History     Tobacco Use    Smoking status: Never Smoker    Smokeless tobacco: Never Used   Substance Use Topics    Alcohol use: No    Drug use: No        Review of Systems:  Review of Systems   Constitutional: Negative for activity change.   HENT: Negative for congestion.    Eyes: Negative for discharge.   Respiratory: Negative for apnea.    Cardiovascular: Negative for chest pain.   Gastrointestinal: Negative for abdominal distention.   Endocrine: Negative for cold intolerance.   Genitourinary: Negative for difficulty urinating.   Musculoskeletal: Negative for arthralgias.   Neurological: Positive for weakness. Negative for dizziness and headaches.   Psychiatric/Behavioral: Negative for agitation.       Vital Signs (Most Recent)  Temp:  97.3 °F (36.3 °C) (09/10/18 1104)  Pulse: 64 (09/10/18 1104)  BP: (!) 166/70 (09/10/18 1104)     74 kg (163 lb 1.6 oz)       Physical Exam:  Physical Exam:    Constitutional: She appears well-developed.     Eyes: Pupils are equal, round, and reactive to light. EOM are normal. Right eye exhibits no discharge. Left eye exhibits no discharge.     Abdominal: Soft.     Skin: Skin displays no rash on trunk and no rash on extremities.     Psych/Behavior: She is alert. She is oriented to person, place, and time.     Musculoskeletal:        Neck: There is no tenderness.        Back: Range of motion is full.        Right Upper Extremities: Range of motion is full. Muscle strength is 5/5. Tone is normal.        Left Upper Extremities: Range of motion is full. Muscle strength is 4/5. Tone is normal.       Right Lower Extremities: Range of motion is full. Muscle strength is 5/5. Tone is normal.        Left Lower Extremities: Range of motion is full. Muscle strength is 4/5. Tone is normal.     Neurological:        Coordination: She has a normal Romberg Test.        Sensory: There is no sensory deficit in the trunk. There is no sensory deficit in the extremities.        DTRs: DTRs are DTRS NORMAL AND SYMMETRICnormal and symmetric. Tricep reflexes are 2+ on the right side and 2+ on the left side. Bicep reflexes are 2+ on the right side and 2+ on the left side. Brachioradialis reflexes are 2+ on the right side and 2+ on the left side. Patellar reflexes are 2+ on the right side and 2+ on the left side. Achilles reflexes are 2+ on the right side and 2+ on the left side. She displays no Babinski's sign on the right side. She displays no Babinski's sign on the left side.        Cranial nerves: Cranial nerve(s) II, III, IV, V, VI, VII, VIII, IX, X, XI and XII are intact.    groin is soft, well-healed      Laboratory  CBC: Reviewed  CMP: Reviewed    Diagnostic Results:  Angiogram: Reviewed.  IR Angiogram Carotid Internal Inc Arch and  Cerebral Unilateral, dated 6/09/2018: Reviewed.     Right M1 MCA occlusion. Successful right M1 thrombectomy    ASSESSMENT/PLAN:       ICD-10-CM ICD-9-CM   1. Embolic stroke involving right middle cerebral artery I63.411 434.11   2. Left-sided weakness R53.1 728.87       PLAN:    1. S/p right MCA thrombectomy. Patient is doing well neurologically has had setbacks in terms of PEs and bilateral lower extremities DVTs. Patient will continue to take daily Plavix. She does not require any further neurosurgical follow up.      2. I will schedule her for Vascular Neurology follow up and I will also schedule her with a primary care doctor that can follow up with her on all of her co-morbidity care  (stroke, PE, high blood pressure) [place] [facility] [place]     3. I spent 35 minutes with the patient, 50% of time in counselingabout the above mentioned issues.          Isa was seen today for follow-up.    Diagnoses and all orders for this visit:    Embolic stroke involving right middle cerebral artery    Left-sided weakness

## 2018-09-11 ENCOUNTER — HOME CARE VISIT (OUTPATIENT)
Dept: NEUROLOGY | Facility: HOSPITAL | Age: 83
End: 2018-09-11

## 2018-09-11 VITALS
SYSTOLIC BLOOD PRESSURE: 130 MMHG | HEART RATE: 76 BPM | DIASTOLIC BLOOD PRESSURE: 64 MMHG | WEIGHT: 161.38 LBS | OXYGEN SATURATION: 95 % | BODY MASS INDEX: 29.52 KG/M2

## 2018-09-11 NOTE — PROGRESS NOTES
Mrs. Sewell VS are WNL on todays visit. She had ER visit at the beginning of the month for DVT, and PE's. Patient prescribed Eliquis. Has PCP appointment on today. She will continue with outpatient therapy next week. LHE educated patient and caregiver on symptoms of DVT, new medication compliance, salt intake as it relates to HBP as stroke RF.

## 2018-10-15 ENCOUNTER — HOME CARE VISIT (OUTPATIENT)
Dept: NEUROLOGY | Facility: HOSPITAL | Age: 83
End: 2018-10-15

## 2018-10-17 VITALS
DIASTOLIC BLOOD PRESSURE: 70 MMHG | SYSTOLIC BLOOD PRESSURE: 130 MMHG | OXYGEN SATURATION: 99 % | BODY MASS INDEX: 29.63 KG/M2 | WEIGHT: 162 LBS | HEART RATE: 61 BPM

## 2018-10-17 NOTE — PROGRESS NOTES
Mrs. Sewell VS are WNL on today's visit. She denies any er visits or hospital stays. Has restated therapy for 2 weeks now. Caregiver states patient is doing everything she would do before stroke except for driving. Patient reports sleeping much better, and has a good appetite. LHE reinforced education on stroke risk factors of Afib.

## 2018-10-19 ENCOUNTER — HOSPITAL ENCOUNTER (OUTPATIENT)
Facility: HOSPITAL | Age: 83
Discharge: HOME | End: 2018-10-22
Attending: EMERGENCY MEDICINE | Admitting: INTERNAL MEDICINE
Payer: MEDICARE

## 2018-10-19 DIAGNOSIS — N39.0 URINARY TRACT INFECTION WITHOUT HEMATURIA, SITE UNSPECIFIED: ICD-10-CM

## 2018-10-19 DIAGNOSIS — I27.82 CHRONIC SADDLE PULMONARY EMBOLISM WITHOUT ACUTE COR PULMONALE: ICD-10-CM

## 2018-10-19 DIAGNOSIS — I26.92 CHRONIC SADDLE PULMONARY EMBOLISM WITHOUT ACUTE COR PULMONALE: ICD-10-CM

## 2018-10-19 DIAGNOSIS — R56.9 SEIZURE: Primary | ICD-10-CM

## 2018-10-19 DIAGNOSIS — R40.20 LOSS OF CONSCIOUSNESS: ICD-10-CM

## 2018-10-19 DIAGNOSIS — I63.311 THROMBOTIC STROKE INVOLVING RIGHT MIDDLE CEREBRAL ARTERY: ICD-10-CM

## 2018-10-19 DIAGNOSIS — N30.00 ACUTE CYSTITIS WITHOUT HEMATURIA: ICD-10-CM

## 2018-10-19 DIAGNOSIS — I48.91 NEW ONSET A-FIB: ICD-10-CM

## 2018-10-19 DIAGNOSIS — R53.1 LEFT-SIDED WEAKNESS: ICD-10-CM

## 2018-10-19 DIAGNOSIS — I82.512 CHRONIC DEEP VEIN THROMBOSIS (DVT) OF FEMORAL VEIN OF LEFT LOWER EXTREMITY: ICD-10-CM

## 2018-10-19 DIAGNOSIS — I63.9 CVA (CEREBRAL VASCULAR ACCIDENT): ICD-10-CM

## 2018-10-19 PROBLEM — E87.6 HYPOKALEMIA: Status: ACTIVE | Noted: 2018-10-19

## 2018-10-19 LAB
ALBUMIN SERPL BCP-MCNC: 3.7 G/DL
ALP SERPL-CCNC: 102 U/L
ALT SERPL W/O P-5'-P-CCNC: 17 U/L
AMPHET+METHAMPHET UR QL: NEGATIVE
ANION GAP SERPL CALC-SCNC: 11 MMOL/L
AST SERPL-CCNC: 18 U/L
BACTERIA #/AREA URNS AUTO: ABNORMAL /HPF
BARBITURATES UR QL SCN>200 NG/ML: NEGATIVE
BASOPHILS # BLD AUTO: 0.04 K/UL
BASOPHILS NFR BLD: 0.6 %
BENZODIAZ UR QL SCN>200 NG/ML: NEGATIVE
BILIRUB SERPL-MCNC: 0.5 MG/DL
BILIRUB UR QL STRIP: NEGATIVE
BNP SERPL-MCNC: 62 PG/ML
BUN SERPL-MCNC: 15 MG/DL
BZE UR QL SCN: NEGATIVE
CALCIUM SERPL-MCNC: 9.7 MG/DL
CANNABINOIDS UR QL SCN: NEGATIVE
CHLORIDE SERPL-SCNC: 108 MMOL/L
CLARITY UR REFRACT.AUTO: ABNORMAL
CO2 SERPL-SCNC: 25 MMOL/L
COLOR UR AUTO: YELLOW
CREAT SERPL-MCNC: 0.9 MG/DL
CREAT UR-MCNC: 113 MG/DL
DIFFERENTIAL METHOD: ABNORMAL
EOSINOPHIL # BLD AUTO: 0.3 K/UL
EOSINOPHIL NFR BLD: 4.6 %
ERYTHROCYTE [DISTWIDTH] IN BLOOD BY AUTOMATED COUNT: 14.4 %
EST. GFR  (AFRICAN AMERICAN): >60 ML/MIN/1.73 M^2
EST. GFR  (NON AFRICAN AMERICAN): 57.7 ML/MIN/1.73 M^2
ETHANOL UR-MCNC: <10 MG/DL
GLUCOSE SERPL-MCNC: 145 MG/DL
GLUCOSE UR QL STRIP: NEGATIVE
GRAM STN SPEC: NORMAL
GRAM STN SPEC: NORMAL
HCT VFR BLD AUTO: 40.8 %
HGB BLD-MCNC: 12.7 G/DL
HGB UR QL STRIP: ABNORMAL
HYALINE CASTS UR QL AUTO: 5 /LPF
IMM GRANULOCYTES # BLD AUTO: 0.03 K/UL
IMM GRANULOCYTES NFR BLD AUTO: 0.5 %
INR PPP: 0.9
KETONES UR QL STRIP: NEGATIVE
LEUKOCYTE ESTERASE UR QL STRIP: ABNORMAL
LYMPHOCYTES # BLD AUTO: 1.2 K/UL
LYMPHOCYTES NFR BLD: 17.9 %
MAGNESIUM SERPL-MCNC: 1.7 MG/DL
MCH RBC QN AUTO: 27.9 PG
MCHC RBC AUTO-ENTMCNC: 31.1 G/DL
MCV RBC AUTO: 90 FL
METHADONE UR QL SCN>300 NG/ML: NEGATIVE
MICROSCOPIC COMMENT: ABNORMAL
MONOCYTES # BLD AUTO: 0.5 K/UL
MONOCYTES NFR BLD: 8.3 %
NEUTROPHILS # BLD AUTO: 4.4 K/UL
NEUTROPHILS NFR BLD: 68.1 %
NITRITE UR QL STRIP: NEGATIVE
NON-SQ EPI CELLS #/AREA URNS AUTO: 0 /HPF
NRBC BLD-RTO: 0 /100 WBC
OPIATES UR QL SCN: NEGATIVE
PCP UR QL SCN>25 NG/ML: NEGATIVE
PH UR STRIP: 5 [PH] (ref 5–8)
PHOSPHATE SERPL-MCNC: 2.7 MG/DL
PLATELET # BLD AUTO: 220 K/UL
PMV BLD AUTO: 9.4 FL
POTASSIUM SERPL-SCNC: 3.3 MMOL/L
PROT SERPL-MCNC: 7.1 G/DL
PROT UR QL STRIP: ABNORMAL
PROTHROMBIN TIME: 10 SEC
RBC # BLD AUTO: 4.55 M/UL
RBC #/AREA URNS AUTO: 5 /HPF (ref 0–4)
SODIUM SERPL-SCNC: 144 MMOL/L
SP GR UR STRIP: 1.02 (ref 1–1.03)
SQUAMOUS #/AREA URNS AUTO: 5 /HPF
TOXICOLOGY INFORMATION: NORMAL
TROPONIN I SERPL DL<=0.01 NG/ML-MCNC: <0.006 NG/ML
URN SPEC COLLECT METH UR: ABNORMAL
UROBILINOGEN UR STRIP-ACNC: NEGATIVE EU/DL
WBC # BLD AUTO: 6.52 K/UL
WBC #/AREA URNS AUTO: >100 /HPF (ref 0–5)

## 2018-10-19 PROCEDURE — 81001 URINALYSIS AUTO W/SCOPE: CPT | Mod: 59

## 2018-10-19 PROCEDURE — 83735 ASSAY OF MAGNESIUM: CPT

## 2018-10-19 PROCEDURE — 84484 ASSAY OF TROPONIN QUANT: CPT

## 2018-10-19 PROCEDURE — 80053 COMPREHEN METABOLIC PANEL: CPT

## 2018-10-19 PROCEDURE — 63600175 PHARM REV CODE 636 W HCPCS: Performed by: HOSPITALIST

## 2018-10-19 PROCEDURE — 99285 EMERGENCY DEPT VISIT HI MDM: CPT | Mod: 25

## 2018-10-19 PROCEDURE — G0378 HOSPITAL OBSERVATION PER HR: HCPCS

## 2018-10-19 PROCEDURE — 99220 PR INITIAL OBSERVATION CARE,LEVL III: CPT | Mod: ,,, | Performed by: HOSPITALIST

## 2018-10-19 PROCEDURE — 63600175 PHARM REV CODE 636 W HCPCS: Performed by: PHYSICIAN ASSISTANT

## 2018-10-19 PROCEDURE — 93005 ELECTROCARDIOGRAM TRACING: CPT

## 2018-10-19 PROCEDURE — 99284 EMERGENCY DEPT VISIT MOD MDM: CPT | Mod: ,,, | Performed by: PHYSICIAN ASSISTANT

## 2018-10-19 PROCEDURE — 96375 TX/PRO/DX INJ NEW DRUG ADDON: CPT

## 2018-10-19 PROCEDURE — 87088 URINE BACTERIA CULTURE: CPT

## 2018-10-19 PROCEDURE — 96374 THER/PROPH/DIAG INJ IV PUSH: CPT

## 2018-10-19 PROCEDURE — 80307 DRUG TEST PRSMV CHEM ANLYZR: CPT

## 2018-10-19 PROCEDURE — 25000003 PHARM REV CODE 250: Performed by: EMERGENCY MEDICINE

## 2018-10-19 PROCEDURE — 84100 ASSAY OF PHOSPHORUS: CPT

## 2018-10-19 PROCEDURE — 87077 CULTURE AEROBIC IDENTIFY: CPT

## 2018-10-19 PROCEDURE — 93010 ELECTROCARDIOGRAM REPORT: CPT | Mod: ,,, | Performed by: INTERNAL MEDICINE

## 2018-10-19 PROCEDURE — 85025 COMPLETE CBC W/AUTO DIFF WBC: CPT

## 2018-10-19 PROCEDURE — 85610 PROTHROMBIN TIME: CPT

## 2018-10-19 PROCEDURE — 83880 ASSAY OF NATRIURETIC PEPTIDE: CPT

## 2018-10-19 PROCEDURE — 87205 SMEAR GRAM STAIN: CPT

## 2018-10-19 PROCEDURE — 87086 URINE CULTURE/COLONY COUNT: CPT

## 2018-10-19 PROCEDURE — 25000003 PHARM REV CODE 250: Performed by: HOSPITALIST

## 2018-10-19 PROCEDURE — 87186 SC STD MICRODIL/AGAR DIL: CPT

## 2018-10-19 RX ORDER — ATORVASTATIN CALCIUM 20 MG/1
40 TABLET, FILM COATED ORAL DAILY
Status: DISCONTINUED | OUTPATIENT
Start: 2018-10-20 | End: 2018-10-22 | Stop reason: HOSPADM

## 2018-10-19 RX ORDER — IBUPROFEN 200 MG
24 TABLET ORAL
Status: DISCONTINUED | OUTPATIENT
Start: 2018-10-19 | End: 2018-10-22 | Stop reason: HOSPADM

## 2018-10-19 RX ORDER — AMOXICILLIN 250 MG
1 CAPSULE ORAL 2 TIMES DAILY PRN
Status: DISCONTINUED | OUTPATIENT
Start: 2018-10-19 | End: 2018-10-22 | Stop reason: HOSPADM

## 2018-10-19 RX ORDER — POTASSIUM CHLORIDE 20 MEQ/1
40 TABLET, EXTENDED RELEASE ORAL
Status: DISCONTINUED | OUTPATIENT
Start: 2018-10-19 | End: 2018-10-19 | Stop reason: SDUPTHER

## 2018-10-19 RX ORDER — CEFTRIAXONE 1 G/1
1 INJECTION, POWDER, FOR SOLUTION INTRAMUSCULAR; INTRAVENOUS
Status: COMPLETED | OUTPATIENT
Start: 2018-10-19 | End: 2018-10-19

## 2018-10-19 RX ORDER — ONDANSETRON 4 MG/1
8 TABLET, ORALLY DISINTEGRATING ORAL EVERY 8 HOURS PRN
Status: DISCONTINUED | OUTPATIENT
Start: 2018-10-19 | End: 2018-10-22 | Stop reason: HOSPADM

## 2018-10-19 RX ORDER — OLMESARTAN MEDOXOMIL 20 MG/1
40 TABLET ORAL DAILY
Status: DISCONTINUED | OUTPATIENT
Start: 2018-10-20 | End: 2018-10-22 | Stop reason: HOSPADM

## 2018-10-19 RX ORDER — ACETAMINOPHEN 325 MG/1
650 TABLET ORAL EVERY 8 HOURS PRN
Status: DISCONTINUED | OUTPATIENT
Start: 2018-10-19 | End: 2018-10-22 | Stop reason: HOSPADM

## 2018-10-19 RX ORDER — CLOPIDOGREL BISULFATE 75 MG/1
75 TABLET ORAL DAILY
Status: DISCONTINUED | OUTPATIENT
Start: 2018-10-20 | End: 2018-10-22 | Stop reason: HOSPADM

## 2018-10-19 RX ORDER — SODIUM CHLORIDE 0.9 % (FLUSH) 0.9 %
5 SYRINGE (ML) INJECTION
Status: DISCONTINUED | OUTPATIENT
Start: 2018-10-19 | End: 2018-10-22 | Stop reason: HOSPADM

## 2018-10-19 RX ORDER — RAMELTEON 8 MG/1
8 TABLET ORAL NIGHTLY PRN
Status: DISCONTINUED | OUTPATIENT
Start: 2018-10-19 | End: 2018-10-22 | Stop reason: HOSPADM

## 2018-10-19 RX ORDER — IBUPROFEN 200 MG
16 TABLET ORAL
Status: DISCONTINUED | OUTPATIENT
Start: 2018-10-19 | End: 2018-10-22 | Stop reason: HOSPADM

## 2018-10-19 RX ORDER — CETIRIZINE HYDROCHLORIDE 10 MG/1
10 TABLET ORAL DAILY
Status: DISCONTINUED | OUTPATIENT
Start: 2018-10-20 | End: 2018-10-22 | Stop reason: HOSPADM

## 2018-10-19 RX ORDER — CEFTRIAXONE 1 G/1
1 INJECTION, POWDER, FOR SOLUTION INTRAMUSCULAR; INTRAVENOUS
Status: DISCONTINUED | OUTPATIENT
Start: 2018-10-20 | End: 2018-10-22

## 2018-10-19 RX ORDER — LEVETIRACETAM 10 MG/ML
1000 INJECTION INTRAVASCULAR ONCE
Status: COMPLETED | OUTPATIENT
Start: 2018-10-19 | End: 2018-10-19

## 2018-10-19 RX ORDER — GLUCAGON 1 MG
1 KIT INJECTION
Status: DISCONTINUED | OUTPATIENT
Start: 2018-10-19 | End: 2018-10-22 | Stop reason: HOSPADM

## 2018-10-19 RX ORDER — POTASSIUM CHLORIDE 20 MEQ/15ML
40 SOLUTION ORAL
Status: COMPLETED | OUTPATIENT
Start: 2018-10-19 | End: 2018-10-19

## 2018-10-19 RX ORDER — AMLODIPINE BESYLATE 5 MG/1
5 TABLET ORAL DAILY
Status: DISCONTINUED | OUTPATIENT
Start: 2018-10-20 | End: 2018-10-22 | Stop reason: HOSPADM

## 2018-10-19 RX ADMIN — APIXABAN 5 MG: 5 TABLET, FILM COATED ORAL at 08:10

## 2018-10-19 RX ADMIN — LEVETIRACETAM 1000 MG: 10 INJECTION INTRAVENOUS at 07:10

## 2018-10-19 RX ADMIN — POTASSIUM CHLORIDE 40 MEQ: 20 SOLUTION ORAL at 05:10

## 2018-10-19 RX ADMIN — CEFTRIAXONE SODIUM 1 G: 1 INJECTION, POWDER, FOR SOLUTION INTRAMUSCULAR; INTRAVENOUS at 06:10

## 2018-10-19 NOTE — ED NOTES
Pt resting quietly on stretcher; remains on continuous cardiac and pulse ox monitoring with non-invasive blood pressure to cycle every 30 minutes.  Bed locked in lowest position; side rails up and locked x 2; call light, bedside table, and personal belongings within reach.  Pt awaiting CT scan; instructed to alert nurse for assistance and before attempting to get out of bed; verbalizes understanding.  Pt denies needs or complaints at this time.  Family at bedside; will continue to monitor.

## 2018-10-19 NOTE — ED PROVIDER NOTES
Encounter Date: 10/19/2018       History     Chief Complaint   Patient presents with    Shaking     Pt presented to the ED via  EMS. Pt was at the hair salon and she had what could of been a seziure. Pt denies hx of seziures.      This is an 87 year old female with a PMH of HTN, right MCA embolic stroke (06/2018) s/p thrombectomy with residual left-sided weakness (Plavix), saddle PE (Eliquis) who presents to the ED with concern for seizure type activity. Patient was with her  at a hair appointment around 3:15 when he looked over and saw the patient making the sign of the cross. She began with twitching of her lip followed by alteration in consciousness and mild twitching of the hands and legs. He reports the patient was not alert at that time and the episode lasted approximately 3-4 minutes. EMS was activated to transport the patient to the ED. On arrival she is alert but drowsy. She is oriented to person, place, and year (date/month unable to determine). She does not recall the events of her day leading up to the episode or the episode itself.           Review of patient's allergies indicates:   Allergen Reactions    Sulfa (sulfonamide antibiotics) Hives     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/2016     Past Surgical History:   Procedure Laterality Date    BREAST BIOPSY Bilateral     BOTH BENIGN    CHOLECYSTECTOMY      FRACTURE SURGERY Right     ankle    HYSTERECTOMY      TONSILLECTOMY       Family History   Problem Relation Age of Onset    Melanoma Neg Hx     Psoriasis Neg Hx     Lupus Neg Hx     Eczema Neg Hx      Social History     Tobacco Use    Smoking status: Never Smoker    Smokeless tobacco: Never Used   Substance Use Topics    Alcohol use: No    Drug use: No      Review of Systems   Constitutional: Negative for chills and fever.   HENT: Negative for sore throat.    Respiratory: Negative for shortness of breath.    Cardiovascular: Negative for chest pain.   Gastrointestinal: Negative for nausea and vomiting.   Genitourinary: Negative for dysuria.   Musculoskeletal: Negative for back pain.   Skin: Negative for rash.   Neurological: Positive for seizures. Negative for weakness.   Hematological: Does not bruise/bleed easily.       Physical Exam     Initial Vitals [10/19/18 1600]   BP Pulse Resp Temp SpO2   (!) 146/85 87 17 97.9 °F (36.6 °C) 97 %      MAP       --         Physical Exam    Constitutional: She appears well-developed and well-nourished. She appears lethargic. No distress.   HENT:   Head: Atraumatic.   No intraoral injury.   Eyes: Conjunctivae and EOM are normal. Pupils are equal, round, and reactive to light.   Cardiovascular: Normal rate, regular rhythm and normal heart sounds.   Pulmonary/Chest: Breath sounds normal. No respiratory distress. She has no wheezes. She has no rhonchi. She has no rales.   Abdominal: Soft. Bowel sounds are normal. There is no tenderness. There is no rebound and no guarding.   Neurological: She is oriented to person, place, and time. She has normal strength and normal reflexes. She appears lethargic. No cranial nerve deficit or sensory deficit. GCS eye subscore is 4. GCS verbal subscore is 5. GCS motor subscore is 6.   Skin: Skin is warm and dry. No rash noted.         ED Course   Procedures  Labs Reviewed   CBC W/ AUTO DIFFERENTIAL - Abnormal; Notable for the following components:       Result Value    MCHC 31.1 (*)     Lymph% 17.9 (*)     All other components within normal limits   COMPREHENSIVE METABOLIC PANEL - Abnormal; Notable for the following components:    Potassium 3.3 (*)     Glucose 145 (*)     eGFR if non  57.7 (*)     All other components within normal limits    Narrative:     ADD ON BNP ORDER #[id number]  PER DR BLU LUIS  10/19/2018    17:15    URINALYSIS, REFLEX TO URINE CULTURE - Abnormal; Notable for the following components:    Appearance, UA Hazy (*)     Protein, UA 1+ (*)     Occult Blood UA 1+ (*)     Leukocytes, UA 3+ (*)     All other components within normal limits    Narrative:     cup only  10/19/2018  16:55    URINALYSIS MICROSCOPIC - Abnormal; Notable for the following components:    RBC, UA 5 (*)     WBC, UA >100 (*)     Bacteria, UA Moderate (*)     Hyaline Casts, UA 5 (*)     All other components within normal limits    Narrative:     cup only  10/19/2018  16:55    CULTURE, URINE   PROTIME-INR   B-TYPE NATRIURETIC PEPTIDE   TROPONIN I   B-TYPE NATRIURETIC PEPTIDE    Narrative:     ADD ON BNP ORDER #[id number] PER DR BLU LUIS  10/19/2018    17:15   add on TROP #[id number] per Blu Luis MD @ 17:32  10/19/2018    TROPONIN I    Narrative:     ADD ON BNP ORDER #[id number] PER DR BLU LUIS  10/19/2018    17:15   add on TROP #[id number] per Blu Luis MD @ 17:32  10/19/2018           Imaging Results          X-Ray Chest AP Portable (In process)                CT Head Without Contrast (Final result)  Result time 10/19/18 18:12:46    Final result by Kalia Encarnacion MD (10/19/18 18:12:46)                 Impression:      1. No acute intracranial abnormality.  2. Right MCA territory encephalomalacia consistent with prior infarct.  Remote lacunar infarcts within the right basal ganglia.  3. Senescent changes.    Electronically signed by resident: Hardeep Vergara  Date:    10/19/2018  Time:    17:52    Electronically signed by: Kalia Encarnacion MD  Date:    10/19/2018  Time:    18:12             Narrative:    EXAMINATION:  CT HEAD WITHOUT CONTRAST    CLINICAL HISTORY:  Seizures new or progressive;    TECHNIQUE:  Low dose axial CT images obtained throughout the head without intravenous contrast. Sagittal and coronal reconstructions were performed.    COMPARISON:  MRI 06/10/2018;  CTA 06/09/2018; CT 06/09/2018.    FINDINGS:  Intracranial compartment:    Prominence of the ventricles and sulci compatible with generalized cerebral volume loss.  No hydrocephalus.  No extra-axial blood or fluid collections.    Evolution of a right MCA territory infarct with encephalomalacia involving the right temporal, parietal, and posterior frontal lobes.  Punctate hypodensities in the right basal ganglia consistent with remote lacunar type infarcts.  Mild patchy hypoattenuation of the supratentorial white matter in a distribution consistent with chronic microvascular ischemic disease.  No new parenchymal mass, hemorrhage, edema or major vascular distribution infarct.  There is no dense vessel sign.  There are extensive calcifications involving the skull base vessels.    Skull/extracranial contents (limited evaluation): No fracture. Mastoid air cells and paranasal sinuses are essentially clear.  There are postsurgical changes of the globes.  Calcifications of the skullbase vessels are present.                                       APC / Resident Notes:   87 year old female presenting with transient LOC/seizure activity.  On exam she is afebrile and nontoxic. She appears drowsy but is arousable to verbal stimuli. She is able to complete a neuro exam with no gross deficits - noting mild left hemiparesis from prior R MCA stroke.    DDx includes but is not limited to seizure, syncope, ICH, electrolyte derangement.    Patient found to have new onset A fib, already anticoagulated on Eliquis, rate is controlled.    UA with 3+leukocytes, >100WBCs, moderate bacteria. Rocephin given, pending culture.  Labs with K 3.3, replaced orally. Otherwise without acute lab findings.  Head CT with no acute findings. Note of changes consistent with prior CVA.    Discussed findings with patient and family - will place in observation. I discussed the care of this patient with my supervising MD.                  Clinical Impression:    The primary encounter diagnosis was Urinary tract infection without hematuria, site unspecified. Diagnoses of Seizure, Loss of consciousness, and New onset a-fib were also pertinent to this visit.      Disposition:   Disposition: Placed in Observation  Condition: Stable                        Rox Song PA-C  10/19/18 2391

## 2018-10-19 NOTE — ED TRIAGE NOTES
Pt to the ER with complaints of seizure-like tonic-clonic activity lasting 5-7 minutes witnessed by the .  EMS reports a postictal period lasting 20-30 minutes.  Pt was found to have a oxygen saturation in the 70's and was placed on a nonrebreather.  Pt with hx of stroke but no hx of seizures.

## 2018-10-19 NOTE — ED NOTES
Pt placed on continuous cardiac and pulse ox monitoring with non-invasive blood pressure to cycle every 30 minutes. Atrial fibrillation with a HR in the 90's noted.  Bed locked in lowest position; side rails up and locked x 2; call light and personal belongings within reach.  Pt instructed to alert nurse for assistance before attempting to get out of bed; verbalizes understanding.  Pt denies needs or complaints at this time.  Family at bedside; will continue to monitor pt.

## 2018-10-20 PROBLEM — I63.311 THROMBOTIC STROKE INVOLVING RIGHT MIDDLE CEREBRAL ARTERY: Status: ACTIVE | Noted: 2018-10-20

## 2018-10-20 LAB
ALBUMIN SERPL BCP-MCNC: 3.2 G/DL
ALP SERPL-CCNC: 86 U/L
ALT SERPL W/O P-5'-P-CCNC: 13 U/L
ANION GAP SERPL CALC-SCNC: 9 MMOL/L
AST SERPL-CCNC: 16 U/L
BASOPHILS # BLD AUTO: 0.04 K/UL
BASOPHILS NFR BLD: 0.7 %
BILIRUB SERPL-MCNC: 0.3 MG/DL
BUN SERPL-MCNC: 14 MG/DL
CALCIUM SERPL-MCNC: 8.9 MG/DL
CHLORIDE SERPL-SCNC: 113 MMOL/L
CO2 SERPL-SCNC: 23 MMOL/L
CREAT SERPL-MCNC: 0.7 MG/DL
DIFFERENTIAL METHOD: ABNORMAL
EOSINOPHIL # BLD AUTO: 0.2 K/UL
EOSINOPHIL NFR BLD: 3.9 %
ERYTHROCYTE [DISTWIDTH] IN BLOOD BY AUTOMATED COUNT: 14.4 %
EST. GFR  (AFRICAN AMERICAN): >60 ML/MIN/1.73 M^2
EST. GFR  (NON AFRICAN AMERICAN): >60 ML/MIN/1.73 M^2
GLUCOSE SERPL-MCNC: 105 MG/DL
HCT VFR BLD AUTO: 35 %
HGB BLD-MCNC: 10.8 G/DL
IMM GRANULOCYTES # BLD AUTO: 0.03 K/UL
IMM GRANULOCYTES NFR BLD AUTO: 0.5 %
LYMPHOCYTES # BLD AUTO: 1.3 K/UL
LYMPHOCYTES NFR BLD: 23.5 %
MAGNESIUM SERPL-MCNC: 1.7 MG/DL
MCH RBC QN AUTO: 27.8 PG
MCHC RBC AUTO-ENTMCNC: 30.9 G/DL
MCV RBC AUTO: 90 FL
MONOCYTES # BLD AUTO: 0.5 K/UL
MONOCYTES NFR BLD: 9.6 %
NEUTROPHILS # BLD AUTO: 3.5 K/UL
NEUTROPHILS NFR BLD: 61.8 %
NRBC BLD-RTO: 0 /100 WBC
PHOSPHATE SERPL-MCNC: 3.5 MG/DL
PLATELET # BLD AUTO: 203 K/UL
PMV BLD AUTO: 9.9 FL
POTASSIUM SERPL-SCNC: 3.9 MMOL/L
PROT SERPL-MCNC: 5.9 G/DL
RBC # BLD AUTO: 3.88 M/UL
SODIUM SERPL-SCNC: 145 MMOL/L
WBC # BLD AUTO: 5.61 K/UL

## 2018-10-20 PROCEDURE — 63600175 PHARM REV CODE 636 W HCPCS: Performed by: HOSPITALIST

## 2018-10-20 PROCEDURE — 85025 COMPLETE CBC W/AUTO DIFF WBC: CPT

## 2018-10-20 PROCEDURE — 25000003 PHARM REV CODE 250: Performed by: PSYCHIATRY & NEUROLOGY

## 2018-10-20 PROCEDURE — 83735 ASSAY OF MAGNESIUM: CPT

## 2018-10-20 PROCEDURE — G0378 HOSPITAL OBSERVATION PER HR: HCPCS

## 2018-10-20 PROCEDURE — 99226 PR SUBSEQUENT OBSERVATION CARE,LEVEL III: CPT | Mod: ,,, | Performed by: INTERNAL MEDICINE

## 2018-10-20 PROCEDURE — 25000003 PHARM REV CODE 250: Performed by: HOSPITALIST

## 2018-10-20 PROCEDURE — 84100 ASSAY OF PHOSPHORUS: CPT

## 2018-10-20 PROCEDURE — 80053 COMPREHEN METABOLIC PANEL: CPT

## 2018-10-20 PROCEDURE — 99215 OFFICE O/P EST HI 40 MIN: CPT | Mod: GC,,, | Performed by: PSYCHIATRY & NEUROLOGY

## 2018-10-20 PROCEDURE — 25000003 PHARM REV CODE 250: Performed by: PHYSICIAN ASSISTANT

## 2018-10-20 RX ORDER — POTASSIUM CHLORIDE 20 MEQ/1
20 TABLET, EXTENDED RELEASE ORAL ONCE
Status: DISCONTINUED | OUTPATIENT
Start: 2018-10-20 | End: 2018-10-20

## 2018-10-20 RX ORDER — POTASSIUM CHLORIDE 20 MEQ/15ML
20 SOLUTION ORAL ONCE
Status: COMPLETED | OUTPATIENT
Start: 2018-10-20 | End: 2018-10-20

## 2018-10-20 RX ORDER — LANOLIN ALCOHOL/MO/W.PET/CERES
400 CREAM (GRAM) TOPICAL EVERY 4 HOURS
Status: COMPLETED | OUTPATIENT
Start: 2018-10-20 | End: 2018-10-20

## 2018-10-20 RX ORDER — LEVETIRACETAM 500 MG/1
500 TABLET ORAL 2 TIMES DAILY
Status: DISCONTINUED | OUTPATIENT
Start: 2018-10-20 | End: 2018-10-22 | Stop reason: HOSPADM

## 2018-10-20 RX ADMIN — APIXABAN 5 MG: 5 TABLET, FILM COATED ORAL at 09:10

## 2018-10-20 RX ADMIN — MAGNESIUM OXIDE TAB 400 MG (241.3 MG ELEMENTAL MG) 400 MG: 400 (241.3 MG) TAB at 09:10

## 2018-10-20 RX ADMIN — APIXABAN 5 MG: 5 TABLET, FILM COATED ORAL at 08:10

## 2018-10-20 RX ADMIN — CLOPIDOGREL 75 MG: 75 TABLET, FILM COATED ORAL at 08:10

## 2018-10-20 RX ADMIN — OLMESARTAN MEDOXOMIL 40 MG: 20 TABLET, FILM COATED ORAL at 08:10

## 2018-10-20 RX ADMIN — LEVETIRACETAM 500 MG: 500 TABLET ORAL at 09:10

## 2018-10-20 RX ADMIN — CEFTRIAXONE SODIUM 1 G: 1 INJECTION, POWDER, FOR SOLUTION INTRAMUSCULAR; INTRAVENOUS at 04:10

## 2018-10-20 RX ADMIN — AMLODIPINE BESYLATE 5 MG: 5 TABLET ORAL at 08:10

## 2018-10-20 RX ADMIN — LEVETIRACETAM 500 MG: 500 TABLET ORAL at 01:10

## 2018-10-20 RX ADMIN — MAGNESIUM OXIDE TAB 400 MG (241.3 MG ELEMENTAL MG) 400 MG: 400 (241.3 MG) TAB at 01:10

## 2018-10-20 RX ADMIN — ATORVASTATIN CALCIUM 40 MG: 20 TABLET, FILM COATED ORAL at 08:10

## 2018-10-20 RX ADMIN — POTASSIUM CHLORIDE 20 MEQ: 20 SOLUTION ORAL at 10:10

## 2018-10-20 NOTE — HOSPITAL COURSE
87F, hx CVA, admitted to OBS for management and evaluation of possible seizure activity and UTI. MRI brain shows small acute cerebral infarct adjacent to old infarction, vas neuro recommends continuing current regimen and long-term OAC (continue home eliquis). 2DCFD with bubble unremarkable. Keppra 500mg BID and 1hr EEG (read pending) monitoring per neuro recs. HTN uncontrolled, started Coreg 6.25mg BID. SLP/PMR evaluated patient, no further recs at this time. PT/OT rec continuing outpatient therapy. Pt stable for discharge with gen neuro and PCP follow up. Scripts given for Coreg 6.25mg BID, Keppra 500mg BID, and Augmentin 500mg BID x 4 days (last dose 10/26). Refill sent for Eliquis.

## 2018-10-20 NOTE — HPI
Ms. Isa Sewell is a 87 y.o. 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  The patient does not remember the events leading up to admission, but was told that she was at the hair salon when she felt numbness on her face, made the sign of the cross, and then developed profuse shaking of both upper extremities and her lip.  The event lasted for about 3-4 minutes, and was followed by a period of alteration in consciousness.  There was no bowel or bladder incontinence.  EMS was called, and on arrival the patient was alert but drowsy.  She was initially placed on non-rebreather for low oxygen sats, and was transitioned to a nasal cannula shortly after.  The patient denies any history of seizures, changes in medications, dysuria, numbness or tingling in the extremities.     In the emergency department, she was found to have a urinary tract infection and have new onset atrial fibrillation, rate controlled with HR 90s.  She was given ceftriaxone and was admitted to Hospital Medicine for further management.

## 2018-10-20 NOTE — CONSULTS
"[facility]  Neurology  Consult Note    Patient Name: Isa Sewell  MRN: [id number]  Admission Date: 10/19/2018  Hospital Length of Stay: 0 days  Code Status: Full Code   Attending Provider: JOSÉ MIGUEL Oliva MD   Consulting Provider: Halley Wilson MD  Primary Care Physician: Dariel Obregon MD  Principal Problem:Seizure    Inpatient consult to Neurology  Consult performed by: Halley Wilson MD  Consult ordered by: Elenita Sweeney MD         Subjective:     Chief Complaint:  Seizure     HPI:   Mrs. Sewell is an 87 year old woman with a history of R MCA stroke and saddle PE on eliquis who was brought in to the ED after an event concerning for seizure. She was at the hair salon with her , when she suddenly "felt funny". She's unable to elaborate. She recalls starting to tremble, and heard her  say "call the ambulance". She has no other memory of the event, and woke up in the hospital. Her  says that she suddenly started shaking all over (bilateral arms/legs), was not responding and had abnormal breathing. He thinks it lasted approximately 5 minutes, and afterwards she was confused. They deny any tongue biting or incontinence. Her confusion gradually improved over the day, and she is now back to baseline. She has never had a seizure before, and has had no recent illnesses or changes to medications. There were no clear triggers.    Her stroke left her with some left sided weakness, and she denies any new weakness, numbness, vision changes or language difficulty.      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/2016       Past Surgical History:   Procedure Laterality Date    BREAST BIOPSY Bilateral     BOTH BENIGN    CHOLECYSTECTOMY      FRACTURE SURGERY Right  "    ankle    HYSTERECTOMY      TONSILLECTOMY         Review of patient's allergies indicates:   Allergen Reactions    Sulfa (sulfonamide antibiotics) Hives          No current facility-administered medications on file prior to encounter.      Current Outpatient Medications on File Prior to Encounter   Medication Sig    amlodipine (NORVASC) 5 MG tablet Take 5 mg by mouth once daily.    apixaban (ELIQUIS) 5 mg (74 tabs) DsPk For the first 7 days take two 5 mg tablets twice daily.  After 7 days take one 5 mg tablet twice daily.    atorvastatin (LIPITOR) 40 MG tablet Take 1 tablet (40 mg total) by mouth once daily.    cetirizine (ZYRTEC) 10 MG tablet Take 10 mg by mouth once daily.    clobetasol 0.05% (TEMOVATE) 0.05 % Oint APPLY TO AFFECTED AREA TWICE DAILY. AVOID FACE, ARMPITS, AND GROIN.    clopidogrel (PLAVIX) 75 mg tablet Take 1 tablet (75 mg total) by mouth once daily.    melatonin 3 mg Tab Take 1 tablet (3 mg total) by mouth every evening.    olmesartan (BENICAR) 40 MG tablet Take 1 tablet (40 mg total) by mouth once daily.    phenazopyridine (PYRIDIUM) 200 MG tablet Take 200 mg by mouth 3 (three) times daily as needed for Pain.    triamcinolone acetonide 0.1% (KENALOG) 0.1 % ointment      Family History     None        Tobacco Use    Smoking status: Never Smoker    Smokeless tobacco: Never Used   Substance and Sexual Activity    Alcohol use: No    Drug use: No    Sexual activity: Not on file     Review of Systems   Constitutional: Negative for activity change, chills and fever.   HENT: Negative for trouble swallowing.    Eyes: Negative for pain and visual disturbance.   Respiratory: Negative for cough and shortness of breath.    Cardiovascular: Negative for chest pain and palpitations.   Gastrointestinal: Negative for abdominal pain, diarrhea, nausea and vomiting.   Musculoskeletal: Negative for back pain and neck pain.   Skin: Negative for rash and wound.   Neurological: Positive for seizures  and weakness. Negative for numbness and headaches.   Psychiatric/Behavioral: Negative for agitation and confusion.     Objective:     Vital Signs (Most Recent):  Temp: 98.6 °F (37 °C) (10/20/18 0820)  Pulse: (!) 55 (10/20/18 1110)  Resp: 18 (10/20/18 0820)  BP: (!) 112/54 (10/20/18 0820)  SpO2: 97 % (10/20/18 0820) Vital Signs (24h Range):  Temp:  [97.7 °F (36.5 °C)-98.7 °F (37.1 °C)] 98.6 °F (37 °C)  Pulse:  [55-98] 55  Resp:  [15-24] 18  SpO2:  [92 %-97 %] 97 %  BP: (102-190)/(45-85) 112/54     Weight: 72.6 kg (160 lb)  Body mass index is 27.46 kg/m².    Physical Exam   Constitutional: She is oriented to person, place, and time. She appears well-developed and well-nourished.   HENT:   Head: Normocephalic and atraumatic.   Eyes: EOM are normal.   Neck: Normal range of motion.   Cardiovascular: Normal rate.   Pulmonary/Chest: Effort normal. No respiratory distress.   Abdominal: She exhibits no distension.   Neurological: She is oriented to person, place, and time. She has a normal Finger-Nose-Finger Test.   Reflex Scores:       Tricep reflexes are 2+ on the right side and 2+ on the left side.       Bicep reflexes are 2+ on the right side and 2+ on the left side.       Brachioradialis reflexes are 2+ on the right side and 2+ on the left side.       Patellar reflexes are 2+ on the right side and 2+ on the left side.       Achilles reflexes are 2+ on the right side and 2+ on the left side.  Skin: Skin is warm and dry.   Psychiatric: She has a normal mood and affect. Her behavior is normal.   Nursing note and vitals reviewed.      NEUROLOGICAL EXAMINATION:     MENTAL STATUS   Oriented to person, place, and time.   Follows 3 step commands.   Attention: normal. Concentration: normal.   Speech: (Mildly dysarthric)  Level of consciousness: drowsy  Able to name object. Normal comprehension.     CRANIAL NERVES     CN II   Visual fields full to confrontation.     CN III, IV, VI   Extraocular motions are normal.     CN V    Facial sensation intact.     CN VII   Facial expression full, symmetric.     MOTOR EXAM   Muscle bulk: normal  Overall muscle tone: normal    Strength   Right deltoid: 5/5  Left deltoid: 4/5  Right biceps: 5/5  Left biceps: 4/5  Right triceps: 5/5  Left triceps: 4/5  Right iliopsoas: 5/5  Left iliopsoas: 4/5  Right quadriceps: 5/5  Left quadriceps: 4/5  Right anterior tibial: 5/5  Left anterior tibial: 4/5  Right gastroc: 5/5  Left gastroc: 4/5    REFLEXES     Reflexes   Right brachioradialis: 2+  Left brachioradialis: 2+  Right biceps: 2+  Left biceps: 2+  Right triceps: 2+  Left triceps: 2+  Right patellar: 2+  Left patellar: 2+  Right achilles: 2+  Left achilles: 2+    SENSORY EXAM   Right arm light touch: normal  Left arm light touch: normal  Right leg light touch: normal  Left leg light touch: decreased.    GAIT AND COORDINATION      Coordination   Finger to nose coordination: normal    Tremor   Resting tremor: absent      Significant Labs:   CBC:   Recent Labs   Lab 10/19/18  1641 10/20/18  0351   WBC 6.52 5.61   HGB 12.7 10.8*   HCT 40.8 35.0*    203     CMP:   Recent Labs   Lab 10/19/18  1641 10/20/18  0351   * 105    145   K 3.3* 3.9    113*   CO2 25 23   BUN 15 14   CREATININE 0.9 0.7   CALCIUM 9.7 8.9   MG 1.7 1.7   PROT 7.1 5.9*   ALBUMIN 3.7 3.2*   BILITOT 0.5 0.3   ALKPHOS 102 86   AST 18 16   ALT 17 13   ANIONGAP 11 9   EGFRNONAA 57.7* >60.0       Significant Imaging: I have reviewed all pertinent imaging results/findings within the past 24 hours.    Assessment and Plan:     * Seizure    Full body convulsion, non-responsive, self-resolved      87 year old woman with a history of RMCA infarct, had an event consistent with a seizure. This was likely due to her known stroke, and further triggered by UTI. With the stroke she is at increased risk for additional seizures, so we recommend treatment with AEDs.     Recommendations:  -- MRI brain without contrast  -- Start  levetiracetam 500mg BID  -- EEG 1h for baseline  -- Will likely be ok for d/c tomorrow from out perspective if she remains stable     Acute cystitis without hematuria    -- may have lowered seizure threshold in the setting of potential epileptic focus (infarct)  -- management per primary     Hx of ischemic right MCA stroke    -- risk for seizures  -- residual left sided weakness  -- on plavix and atorvastatin for secondary prevention     Acute saddle pulmonary embolism without acute cor pulmonale    -- on apixaban     Acute deep vein thrombosis (DVT) of femoral vein of left lower extremity    -- on apixaban     Left-sided weakness    Chronic      -- due to RMCA stroke         VTE Risk Mitigation (From admission, onward)        Ordered     apixaban tablet 5 mg  2 times daily      10/19/18 1938          Thank you for your consult. I will follow-up with patient. Please contact us if you have any additional questions.    Halley Wilson MD  Neurology  [facility]

## 2018-10-20 NOTE — PLAN OF CARE
Problem: Patient Care Overview  Goal: Plan of Care Review  Outcome: Ongoing (interventions implemented as appropriate)  Pt is alert and oriented x4 and verbally responsive to care. VSS. No c/o pain or discomfort. Cont IV abx. MRI of brain done this shift. Awaiting EEG. Pt resting in bed at this time with family at bedside, cont to monitor.

## 2018-10-20 NOTE — ASSESSMENT & PLAN NOTE
- Likely infection induced  - [id number] score: 6  - Continue tele  - Continue Eliquis 5mg PO BID  - Will hold on repeat echo at this time given normal BNP  10/20: NSR on tele; will not pursue further work up at this time

## 2018-10-20 NOTE — H&P
[facility] Hospital Medicine  History & Physical      Patient Name: Isa Sewell  MRN:  [id number]  Date of Admission:  10/19/2018  Attending Physician:  JOSÉ MIGUEL Oliva MD  Primary Care Physician: Dariel Obregon MD    [facility] Medicine Team: [facility]  Elenita Sweeney MD    Patient information was obtained from patient and ER records.    Subjective:     Principle Problem:  Seizure-like activity    Chief Complaint:    Chief Complaint   Patient presents with    Shaking     Pt presented to the ED via  EMS. Pt was at the hair salon and she had what could of been a seziure. Pt denies hx of seziures.        HPI:  Ms. Isa Sewell is a 87 y.o. 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  The patient does not remember the events leading up to admission, but was told that she was at the hair salon when she felt numbness on her face, made the sign of the cross, and then developed profuse shaking of both upper extremities and her lip.  The event lasted for about 3-4 minutes, and was followed by a period of alteration in consciousness.  There was no bowel or bladder incontinence.  EMS was called, and on arrival the patient was alert but drowsy.  She was initially placed on non-rebreather for low oxygen sats, and was transitioned to a nasal cannula shortly after.  The patient denies any history of seizures, changes in medications, dysuria, numbness or tingling in the extremities.    In the emergency department, she was found to have a urinary tract infection and have new onset atrial fibrillation, rate controlled with HR 90s.  She was given ceftriaxone and was admitted to Hospital Medicine for further management.      Past Medical History:   Diagnosis Date     Bullous pemphigoid     pemphigoid nodularis variant    DVT (deep venous thrombosis)     Dysphagia 6/12/2018    Embolic stroke involving right middle cerebral artery 6/9/2018    Hypertension     Pemphigus 4/4/2016       Past Surgical History:   Procedure Laterality Date    BREAST BIOPSY Bilateral     BOTH BENIGN    CHOLECYSTECTOMY      FRACTURE SURGERY Right     ankle    HYSTERECTOMY      TONSILLECTOMY         Family History   Problem Relation Age of Onset    Melanoma Neg Hx     Psoriasis Neg Hx     Lupus Neg Hx     Eczema Neg Hx        Social History     Socioeconomic History    Marital status:      Spouse name: None    Number of children: None    Years of education: None    Highest education level: None   Social Needs    Financial resource strain: None    Food insecurity - worry: None    Food insecurity - inability: None    Transportation needs - medical: None    Transportation needs - non-medical: None   Occupational History    Occupation: retired   Tobacco Use    Smoking status: Never Smoker    Smokeless tobacco: Never Used   Substance and Sexual Activity    Alcohol use: No    Drug use: No    Sexual activity: None   Other Topics Concern    Are you pregnant or think you may be? No    Breast-feeding No   Social History Narrative    Lives in [place] with her  and sometimes her grandson. She enjoys cooking. She has 5 children. She is W[employer] and attends [employer].        No current facility-administered medications on file prior to encounter.      Current Outpatient Medications on File Prior to Encounter   Medication Sig Dispense Refill    amlodipine (NORVASC) 5 MG tablet Take 5 mg by mouth once daily.      apixaban (ELIQUIS) 5 mg (74 tabs) DsPk For the first 7 days take two 5 mg tablets twice daily.  After 7 days take one 5 mg tablet twice daily. 74 tablet 0    atorvastatin (LIPITOR) 40 MG tablet Take 1 tablet (40 mg total) by mouth once daily. 90 tablet 3     cetirizine (ZYRTEC) 10 MG tablet Take 10 mg by mouth once daily.      clobetasol 0.05% (TEMOVATE) 0.05 % Oint APPLY TO AFFECTED AREA TWICE DAILY. AVOID FACE, ARMPITS, AND GROIN. 60 g 2    clopidogrel (PLAVIX) 75 mg tablet Take 1 tablet (75 mg total) by mouth once daily. 30 tablet 11    melatonin 3 mg Tab Take 1 tablet (3 mg total) by mouth every evening. 90 tablet 3    olmesartan (BENICAR) 40 MG tablet Take 1 tablet (40 mg total) by mouth once daily. 90 tablet 3    phenazopyridine (PYRIDIUM) 200 MG tablet Take 200 mg by mouth 3 (three) times daily as needed for Pain.      triamcinolone acetonide 0.1% (KENALOG) 0.1 % ointment   3    [DISCONTINUED] fluconazole (DIFLUCAN) 200 MG Tab   0    [DISCONTINUED] hydrOXYzine HCl (ATARAX) 25 MG tablet TAKE ONE TABLET BY MOUTH AT NIGHT AS NEEDED FOR ITCHING. 30 tablet 1    [DISCONTINUED] levocetirizine (XYZAL) 5 MG tablet Take 5 mg by mouth every evening.      [DISCONTINUED] PROTONIX 40 mg GrPS Use as directed. Take 1 packet by mouth once daily  0    [DISCONTINUED] trimethoprim (TRIMPEX) 100 mg Tab Take 100 mg by mouth once daily.          Review of patient's allergies indicates:   Allergen Reactions    Sulfa (sulfonamide antibiotics) Hives       Review Of Systems:  Constitutional: Negative for chills, fatigue, fever.   HENT: Negative for sore throat, trouble swallowing.    Eyes: Negative for photophobia, visual disturbance.   Respiratory: Negative for cough, shortness of breath.    Cardiovascular: Negative for chest pain, palpitations, leg swelling.   Gastrointestinal: Negative for abdominal pain, constipation, diarrhea, nausea, vomiting.   Endocrine: Negative for cold intolerance, heat intolerance.   Genitourinary: Negative for dysuria, frequency.   Musculoskeletal: Negative for arthralgias, myalgias.   Skin: Negative for rash, wound, erythema   Neurological: Negative for dizziness, syncope, weakness, light-headedness.   Psychiatric/Behavioral: + confusion  All  other systems reviewed and are negative.      Objective:     Temp:  [97.9 °F (36.6 °C)]   Pulse:  [87-98]   Resp:  [17-19]   BP: (146-190)/(82-85)   SpO2:  [97 %]   Body mass index is 27.46 kg/m².    Physical Exam:  Constitutional: Appears well-developed and well-nourished.   Head: Normocephalic and atraumatic.   Mouth/Throat: Oropharynx is clear and moist.   Eyes: EOM are normal. Pupils are equal, round, and reactive to light. No scleral icterus.   Neck: Normal range of motion. Neck supple.   Cardiovascular: Normal heart rate.  Regular heart rhythm.  Systolic ejection murmur.  Pulmonary/Chest: Effort normal and breath sounds normal. No respiratory distress. No wheezes, rales, or rhonchi  Abdominal: Soft. Bowel sounds are normal.  No distension.  No tenderness  Musculoskeletal: Normal range of motion. No edema.   Neurological: Alert and oriented to person, place, and time.   Skin: Skin is warm and dry.   Psychiatric: Normal mood and affect. Behavior is normal.     No intake or output data in the 24 hours ending 10/19/18 1940  Lab Results   Component Value Date    WBC 6.52 10/19/2018    HGB 12.7 10/19/2018    HCT 40.8 10/19/2018    MCV 90 10/19/2018     10/19/2018     Recent Labs   Lab 10/19/18  1641   *      K 3.3*      CO2 25   BUN 15   CREATININE 0.9   CALCIUM 9.7     Lab Results   Component Value Date    INR 0.9 10/19/2018    INR 0.9 09/03/2018    INR 1.0 06/09/2018     Lab Results   Component Value Date    HGBA1C 6.2 (H) 06/09/2018     No results for input(s): POCTGLUCOSE in the last 72 hours.  No results for input(s): LACTATE in the last 72 hours.       Assessment/Plan:     Ms. Isa Sewell is a 87 y.o. female who presented to [facility] on 10/19/2018 with seizure like activity.    Seizure Like Activity  · Unclear at this time if new onset seizure, or if provoked by UTI  · Check EEG  · Will give Keppra 1g IV x 1  · Seizures precautions  · Epilepsy consulted, appreciate  assistance    Acute Cystitis without Hematuria  · UA dirty.  F/u urine gram stain and culture  · Start Ceftriaxone 1g IV q24h (start date 10/19)    New Onset AFib  · Likely infection induced  · [id number] score: 6  · Continue tele  · Continue Eliquis 5mg PO BID  · Will hold on repeat echo at this time given normal BNP    Hypokalemia  · K 3.3, check Mag  · Replace with PO    History of Ischemic Right MCA with Residual Left Sided Weakness  · Sept 2018  · Continue Eliquis 5mg PO BID  · Continue Plavix 75mg PO daily  · Continue Lipitor 40mg PO daily    History of LLE Femoral Vein DVT with Saddle PE  · Sept 2018  · Continue Eliquis 5mg PO BID    Essential HTN  · Chronic and stable  · Continue Norvasc 5mg PO daily  · Continue Olmesartan 40mg PO daily    HLD  · Chronic and stable  · Continue Lipitor 40mg PO daily     Diet:  Regular  GI PPx:  Not indicated  DVT PPx:  Eliquis  Goals of Care:  Full    Disposition:  Pending Neuro recs  Discharge Needs:  TBD      Elenita Sweeney MD  [facility]  Cell:  [phone]  Spectra:  [id number]  Pager:  [phone]

## 2018-10-20 NOTE — ASSESSMENT & PLAN NOTE
- Sept 2018  - Continue Eliquis 5mg PO BID  - Continue Plavix 75mg PO daily  - Continue Lipitor 40mg PO daily

## 2018-10-20 NOTE — SUBJECTIVE & OBJECTIVE
Interval History: No acute events overnight. Pt resting in bed in NAD with family at bedside. No new complaints. Discussed plan of care.     Review of Systems   Constitutional: Negative for chills, fatigue and fever.   HENT: Negative for sore throat and trouble swallowing.    Eyes: Negative for discharge and redness.   Respiratory: Negative for cough and shortness of breath.    Genitourinary: Negative for dysuria and frequency.   Musculoskeletal: Negative for arthralgias and myalgias.   Neurological: Negative for dizziness, syncope and light-headedness.   Psychiatric/Behavioral: Negative for agitation and decreased concentration.     Objective:     Vital Signs (Most Recent):  Temp: 97.2 °F (36.2 °C) (10/20/18 1251)  Pulse: 61 (10/20/18 1251)  Resp: 16 (10/20/18 1251)  BP: (!) 158/70 (10/20/18 1251)  SpO2: (!) 93 % (10/20/18 1251) Vital Signs (24h Range):  Temp:  [97.2 °F (36.2 °C)-98.7 °F (37.1 °C)] 97.2 °F (36.2 °C)  Pulse:  [55-98] 61  Resp:  [15-24] 16  SpO2:  [92 %-97 %] 93 %  BP: (102-190)/(45-85) 158/70     Weight: 72.6 kg (160 lb)  Body mass index is 27.46 kg/m².  No intake or output data in the 24 hours ending 10/20/18 1401   Physical Exam   Constitutional: She is oriented to person, place, and time. No distress.   HENT:   Head: Normocephalic and atraumatic.   Eyes: EOM are normal. Right eye exhibits no discharge. Left eye exhibits no discharge.   Cardiovascular: Normal rate and regular rhythm.   Pulmonary/Chest: Effort normal. No respiratory distress.   Abdominal: Soft. Bowel sounds are normal. She exhibits no distension. There is no tenderness.   Musculoskeletal: She exhibits no edema or tenderness.   Neurological: She is alert and oriented to person, place, and time.   Skin: Skin is warm and dry. She is not diaphoretic.   Psychiatric: She has a normal mood and affect. Thought content normal.       Significant Labs: All pertinent labs within the past 24 hours have been reviewed.    Significant Imaging: I  have reviewed all pertinent imaging results/findings within the past 24 hours.

## 2018-10-20 NOTE — ASSESSMENT & PLAN NOTE
87 year old woman with a history of RMCA infarct, had an event consistent with a seizure. This was likely due to her known stroke, and further triggered by UTI. With the stroke she is at increased risk for additional seizures, so we recommend treatment with AEDs.     Recommendations:  -- MRI brain without contrast  -- Start levetiracetam 500mg BID  -- EEG 1h for baseline  -- Will likely be ok for d/c tomorrow from out perspective if she remains stable

## 2018-10-20 NOTE — ASSESSMENT & PLAN NOTE
-- risk for seizures  -- residual left sided weakness  -- on plavix and atorvastatin for secondary prevention

## 2018-10-20 NOTE — SUBJECTIVE & OBJECTIVE
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/2016       Past Surgical History:   Procedure Laterality Date    BREAST BIOPSY Bilateral     BOTH BENIGN    CHOLECYSTECTOMY      FRACTURE SURGERY Right     ankle    HYSTERECTOMY      TONSILLECTOMY         Review of patient's allergies indicates:   Allergen Reactions    Sulfa (sulfonamide antibiotics) Hives          No current facility-administered medications on file prior to encounter.      Current Outpatient Medications on File Prior to Encounter   Medication Sig    amlodipine (NORVASC) 5 MG tablet Take 5 mg by mouth once daily.    apixaban (ELIQUIS) 5 mg (74 tabs) DsPk For the first 7 days take two 5 mg tablets twice daily.  After 7 days take one 5 mg tablet twice daily.    atorvastatin (LIPITOR) 40 MG tablet Take 1 tablet (40 mg total) by mouth once daily.    cetirizine (ZYRTEC) 10 MG tablet Take 10 mg by mouth once daily.    clobetasol 0.05% (TEMOVATE) 0.05 % Oint APPLY TO AFFECTED AREA TWICE DAILY. AVOID FACE, ARMPITS, AND GROIN.    clopidogrel (PLAVIX) 75 mg tablet Take 1 tablet (75 mg total) by mouth once daily.    melatonin 3 mg Tab Take 1 tablet (3 mg total) by mouth every evening.    olmesartan (BENICAR) 40 MG tablet Take 1 tablet (40 mg total) by mouth once daily.    phenazopyridine (PYRIDIUM) 200 MG tablet Take 200 mg by mouth 3 (three) times daily as needed for Pain.    triamcinolone acetonide 0.1% (KENALOG) 0.1 % ointment      Family History     None        Tobacco Use    Smoking status: Never Smoker    Smokeless tobacco: Never Used   Substance and Sexual Activity    Alcohol use: No    Drug use: No    Sexual activity: Not on file     Review of Systems   Constitutional: Negative for activity change, chills and fever.   HENT:  Negative for trouble swallowing.    Eyes: Negative for pain and visual disturbance.   Respiratory: Negative for cough and shortness of breath.    Cardiovascular: Negative for chest pain and palpitations.   Gastrointestinal: Negative for abdominal pain, diarrhea, nausea and vomiting.   Musculoskeletal: Negative for back pain and neck pain.   Skin: Negative for rash and wound.   Neurological: Positive for seizures and weakness. Negative for numbness and headaches.   Psychiatric/Behavioral: Negative for agitation and confusion.     Objective:     Vital Signs (Most Recent):  Temp: 98.6 °F (37 °C) (10/20/18 0820)  Pulse: (!) 55 (10/20/18 1110)  Resp: 18 (10/20/18 0820)  BP: (!) 112/54 (10/20/18 0820)  SpO2: 97 % (10/20/18 0820) Vital Signs (24h Range):  Temp:  [97.7 °F (36.5 °C)-98.7 °F (37.1 °C)] 98.6 °F (37 °C)  Pulse:  [55-98] 55  Resp:  [15-24] 18  SpO2:  [92 %-97 %] 97 %  BP: (102-190)/(45-85) 112/54     Weight: 72.6 kg (160 lb)  Body mass index is 27.46 kg/m².    Physical Exam   Constitutional: She is oriented to person, place, and time. She appears well-developed and well-nourished.   HENT:   Head: Normocephalic and atraumatic.   Eyes: EOM are normal.   Neck: Normal range of motion.   Cardiovascular: Normal rate.   Pulmonary/Chest: Effort normal. No respiratory distress.   Abdominal: She exhibits no distension.   Neurological: She is oriented to person, place, and time. She has a normal Finger-Nose-Finger Test.   Reflex Scores:       Tricep reflexes are 2+ on the right side and 2+ on the left side.       Bicep reflexes are 2+ on the right side and 2+ on the left side.       Brachioradialis reflexes are 2+ on the right side and 2+ on the left side.       Patellar reflexes are 2+ on the right side and 2+ on the left side.       Achilles reflexes are 2+ on the right side and 2+ on the left side.  Skin: Skin is warm and dry.   Psychiatric: She has a normal mood and affect. Her behavior is normal.   Nursing note and  vitals reviewed.      NEUROLOGICAL EXAMINATION:     MENTAL STATUS   Oriented to person, place, and time.   Follows 3 step commands.   Attention: normal. Concentration: normal.   Speech: (Mildly dysarthric)  Level of consciousness: drowsy  Able to name object. Normal comprehension.     CRANIAL NERVES     CN II   Visual fields full to confrontation.     CN III, IV, VI   Extraocular motions are normal.     CN V   Facial sensation intact.     CN VII   Facial expression full, symmetric.     MOTOR EXAM   Muscle bulk: normal  Overall muscle tone: normal    Strength   Right deltoid: 5/5  Left deltoid: 4/5  Right biceps: 5/5  Left biceps: 4/5  Right triceps: 5/5  Left triceps: 4/5  Right iliopsoas: 5/5  Left iliopsoas: 4/5  Right quadriceps: 5/5  Left quadriceps: 4/5  Right anterior tibial: 5/5  Left anterior tibial: 4/5  Right gastroc: 5/5  Left gastroc: 4/5    REFLEXES     Reflexes   Right brachioradialis: 2+  Left brachioradialis: 2+  Right biceps: 2+  Left biceps: 2+  Right triceps: 2+  Left triceps: 2+  Right patellar: 2+  Left patellar: 2+  Right achilles: 2+  Left achilles: 2+    SENSORY EXAM   Right arm light touch: normal  Left arm light touch: normal  Right leg light touch: normal  Left leg light touch: decreased.    GAIT AND COORDINATION      Coordination   Finger to nose coordination: normal    Tremor   Resting tremor: absent      Significant Labs:   CBC:   Recent Labs   Lab 10/19/18  1641 10/20/18  0351   WBC 6.52 5.61   HGB 12.7 10.8*   HCT 40.8 35.0*    203     CMP:   Recent Labs   Lab 10/19/18  1641 10/20/18  0351   * 105    145   K 3.3* 3.9    113*   CO2 25 23   BUN 15 14   CREATININE 0.9 0.7   CALCIUM 9.7 8.9   MG 1.7 1.7   PROT 7.1 5.9*   ALBUMIN 3.7 3.2*   BILITOT 0.5 0.3   ALKPHOS 102 86   AST 18 16   ALT 17 13   ANIONGAP 11 9   EGFRNONAA 57.7* >60.0       Significant Imaging: I have reviewed all pertinent imaging results/findings within the past 24 hours.

## 2018-10-20 NOTE — ED NOTES
Pt resting quietly on stretcher; remains on continuous cardiac and pulse ox monitoring with non-invasive blood pressure to cycle every 30 minutes.  VS stable; NSR noted. Bed locked in lowest position; side rails up and locked x 2; call light, bedside table, and personal belongings within reach.  Pt awaiting room assignment;  instructed to alert nurse for assistance and before attempting to get out of bed; verbalizes understanding.  Pt denies needs or complaints at this time.  Daughter remains at bedside; will continue to monitor.

## 2018-10-20 NOTE — ASSESSMENT & PLAN NOTE
- Chronic and stable  - Continue Norvasc 5mg PO daily  - Continue Olmesartan 40mg PO daily  10/20: stable

## 2018-10-20 NOTE — ASSESSMENT & PLAN NOTE
- UA dirty.  F/u urine gram stain and culture  - Start Ceftriaxone 1g IV q24h (start date 10/19)  10/20: stain shows gram neg rods; UCx pending  - Continue Ceftriaxone

## 2018-10-20 NOTE — ASSESSMENT & PLAN NOTE
- Unclear at this time if new onset seizure, or if provoked by UTI  - Will give Keppra 1g IV x 1  - Seizures precautions  - Epilepsy consulted, appreciate assistance  10/20:   - MRI brain pending; levetiracetam 500mg BID per neuro recs, appreciated  - 1 hr EEG

## 2018-10-20 NOTE — HPI
"Mrs. Sewell is an 87 year old woman with a history of R MCA stroke and saddle PE on eliquis who was brought in to the ED after an event concerning for seizure. She was at the hair salon with her , when she suddenly "felt funny". She's unable to elaborate. She recalls starting to tremble, and heard her  say "call the ambulance". She has no other memory of the event, and woke up in the hospital. Her  says that she suddenly started shaking all over (bilateral arms/legs), was not responding and had abnormal breathing. He thinks it lasted approximately 5 minutes, and afterwards she was confused. They deny any tongue biting or incontinence. Her confusion gradually improved over the day, and she is now back to baseline. She has never had a seizure before, and has had no recent illnesses or changes to medications. There were no clear triggers.    Her stroke left her with some left sided weakness, and she denies any new weakness, numbness, vision changes or language difficulty.   "

## 2018-10-20 NOTE — ASSESSMENT & PLAN NOTE
Acute saddle pulmonary embolism without acute cor pulmonale  - Sept 2018  - Continue Eliquis 5mg PO BID

## 2018-10-20 NOTE — PROGRESS NOTES
[facility] Hospital Medicine  Progress Note    Patient Name: Isa Sewell  MRN: [id number]  Patient Class: OP- Observation   Admission Date: 10/19/2018  Length of Stay: 0 days  Attending Physician: JOSÉ MIGUEL Oliva MD  Primary Care Provider: Dariel Obregon MD    [facility] Medicine Team: [facility] HOSP MED V Tonya Garcias PA-C     I HAVE PERSONALLY TAKEN THE HISTORY, EXAMINED THIS PATIENT,  AND AGREE WITH THE PHYSICIAN ASSISTANT'S PHYSICAL EXAM AND ASSESSMENT AND PLAN AS STATED BELOW.  ALL LABS AND RADIOGRAPHS REVIEWED.    Subjective:     Principal Problem:Seizure    HPI:  Ms. Isa Sewell is a 87 y.o. 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  The patient does not remember the events leading up to admission, but was told that she was at the hair salon when she felt numbness on her face, made the sign of the cross, and then developed profuse shaking of both upper extremities and her lip.  The event lasted for about 3-4 minutes, and was followed by a period of alteration in consciousness.  There was no bowel or bladder incontinence.  EMS was called, and on arrival the patient was alert but drowsy.  She was initially placed on non-rebreather for low oxygen sats, and was transitioned to a nasal cannula shortly after.  The patient denies any history of seizures, changes in medications, dysuria, numbness or tingling in the extremities.     In the emergency department, she was found to have a urinary tract infection and have new onset atrial fibrillation, rate controlled with HR 90s.  She was given ceftriaxone and was admitted to Hospital Medicine for further management.    Hospital Course:  87F, hx CVA, admitted to OBS for management and evaluation of possible  seizure activity and UTI. Continue Rocephin, gram stain shows gram - rods, UCx pending. Keppra 500mg BID and 1hr EEG monitoring per neuro recs. MRI brain pending.     Interval History: No acute events overnight. Pt resting in bed in NAD with family at bedside. No new complaints. Discussed plan of care.     Review of Systems   Constitutional: Negative for chills, fatigue and fever.   HENT: Negative for sore throat and trouble swallowing.    Eyes: Negative for discharge and redness.   Respiratory: Negative for cough and shortness of breath.    Genitourinary: Negative for dysuria and frequency.   Musculoskeletal: Negative for arthralgias and myalgias.   Neurological: Negative for dizziness, syncope and light-headedness.   Psychiatric/Behavioral: Negative for agitation and decreased concentration.     Objective:     Vital Signs (Most Recent):  Temp: 97.2 °F (36.2 °C) (10/20/18 1251)  Pulse: 61 (10/20/18 1251)  Resp: 16 (10/20/18 1251)  BP: (!) 158/70 (10/20/18 1251)  SpO2: (!) 93 % (10/20/18 1251) Vital Signs (24h Range):  Temp:  [97.2 °F (36.2 °C)-98.7 °F (37.1 °C)] 97.2 °F (36.2 °C)  Pulse:  [55-98] 61  Resp:  [15-24] 16  SpO2:  [92 %-97 %] 93 %  BP: (102-190)/(45-85) 158/70     Weight: 72.6 kg (160 lb)  Body mass index is 27.46 kg/m².  No intake or output data in the 24 hours ending 10/20/18 1401   Physical Exam   Constitutional: She is oriented to person, place, and time. No distress.   HENT:   Head: Normocephalic and atraumatic.   Eyes: EOM are normal. Right eye exhibits no discharge. Left eye exhibits no discharge.   Cardiovascular: Normal rate and regular rhythm.   Pulmonary/Chest: Effort normal. No respiratory distress.   Abdominal: Soft. Bowel sounds are normal. She exhibits no distension. There is no tenderness.   Musculoskeletal: She exhibits no edema or tenderness.   Neurological: She is alert and oriented to person, place, and time.   Skin: Skin is warm and dry. She is not diaphoretic.   Psychiatric: She  has a normal mood and affect. Thought content normal.       Significant Labs: All pertinent labs within the past 24 hours have been reviewed.    Significant Imaging: I have reviewed all pertinent imaging results/findings within the past 24 hours.    Assessment/Plan:      * Seizure    - Unclear at this time if new onset seizure, or if provoked by UTI  - Will give Keppra 1g IV x 1  - Seizures precautions  - Epilepsy consulted, appreciate assistance  10/20:   - MRI brain pending; levetiracetam 500mg BID per neuro recs, appreciated  - 1 hr EEG     Acute cystitis without hematuria    - UA dirty.  F/u urine gram stain and culture  - Start Ceftriaxone 1g IV q24h (start date 10/19)  10/20: stain shows gram neg rods; UCx pending  - Continue Ceftriaxone     New onset a-fib    - Likely infection induced  - [id number] score: 6  - Continue tele  - Continue Eliquis 5mg PO BID  - Will hold on repeat echo at this time given normal BNP  10/20: NSR on tele; will not pursue further work up at this time       Hypokalemia    - K 3.3, check Mag  - Replace with PO  10/20: 3.3 > 3.9, replace  Mg 1.7; replace     Hx of ischemic right MCA stroke    - Sept 2018  - Continue Eliquis 5mg PO BID  - Continue Plavix 75mg PO daily  - Continue Lipitor 40mg PO daily     Acute deep vein thrombosis (DVT) of femoral vein of left lower extremity    Acute saddle pulmonary embolism without acute cor pulmonale  - Sept 2018  - Continue Eliquis 5mg PO BID     Benign essential HTN    - Chronic and stable  - Continue Norvasc 5mg PO daily  - Continue Olmesartan 40mg PO daily  10/20: stable     Hyperlipidemia    - Chronic and stable  - Continue Lipitor 40mg PO daily       VTE Risk Mitigation (From admission, onward)        Ordered     apixaban tablet 5 mg  2 times daily      10/19/18 1938            Discussed with staff.  Tonya Garcias PA-C  Department of Hospital Medicine   [facility]

## 2018-10-21 PROBLEM — I27.82 CHRONIC SADDLE PULMONARY EMBOLISM WITHOUT ACUTE COR PULMONALE: Status: ACTIVE | Noted: 2018-09-03

## 2018-10-21 PROBLEM — I82.512 CHRONIC DEEP VEIN THROMBOSIS (DVT) OF FEMORAL VEIN OF LEFT LOWER EXTREMITY: Status: ACTIVE | Noted: 2018-09-03

## 2018-10-21 LAB
ALBUMIN SERPL BCP-MCNC: 3.2 G/DL
ALP SERPL-CCNC: 84 U/L
ALT SERPL W/O P-5'-P-CCNC: 16 U/L
ANION GAP SERPL CALC-SCNC: 9 MMOL/L
AST SERPL-CCNC: 18 U/L
BACTERIA UR CULT: NORMAL
BASOPHILS # BLD AUTO: 0.04 K/UL
BASOPHILS NFR BLD: 0.7 %
BILIRUB SERPL-MCNC: 0.3 MG/DL
BUN SERPL-MCNC: 17 MG/DL
CALCIUM SERPL-MCNC: 9.7 MG/DL
CHLORIDE SERPL-SCNC: 112 MMOL/L
CO2 SERPL-SCNC: 24 MMOL/L
CREAT SERPL-MCNC: 0.8 MG/DL
DIFFERENTIAL METHOD: ABNORMAL
EOSINOPHIL # BLD AUTO: 0.3 K/UL
EOSINOPHIL NFR BLD: 5.9 %
ERYTHROCYTE [DISTWIDTH] IN BLOOD BY AUTOMATED COUNT: 14.5 %
EST. GFR  (AFRICAN AMERICAN): >60 ML/MIN/1.73 M^2
EST. GFR  (NON AFRICAN AMERICAN): >60 ML/MIN/1.73 M^2
GLUCOSE SERPL-MCNC: 108 MG/DL
HCT VFR BLD AUTO: 37.9 %
HGB BLD-MCNC: 11.7 G/DL
IMM GRANULOCYTES # BLD AUTO: 0.03 K/UL
IMM GRANULOCYTES NFR BLD AUTO: 0.5 %
LYMPHOCYTES # BLD AUTO: 1.2 K/UL
LYMPHOCYTES NFR BLD: 20.9 %
MAGNESIUM SERPL-MCNC: 1.8 MG/DL
MCH RBC QN AUTO: 27.9 PG
MCHC RBC AUTO-ENTMCNC: 30.9 G/DL
MCV RBC AUTO: 91 FL
MONOCYTES # BLD AUTO: 0.6 K/UL
MONOCYTES NFR BLD: 10 %
NEUTROPHILS # BLD AUTO: 3.6 K/UL
NEUTROPHILS NFR BLD: 62 %
NRBC BLD-RTO: 0 /100 WBC
PHOSPHATE SERPL-MCNC: 3.6 MG/DL
PLATELET # BLD AUTO: 212 K/UL
PMV BLD AUTO: 10.2 FL
POTASSIUM SERPL-SCNC: 3.9 MMOL/L
PROT SERPL-MCNC: 6.1 G/DL
RBC # BLD AUTO: 4.19 M/UL
SODIUM SERPL-SCNC: 145 MMOL/L
WBC # BLD AUTO: 5.78 K/UL

## 2018-10-21 PROCEDURE — 97161 PT EVAL LOW COMPLEX 20 MIN: CPT

## 2018-10-21 PROCEDURE — 25000003 PHARM REV CODE 250: Performed by: PSYCHIATRY & NEUROLOGY

## 2018-10-21 PROCEDURE — 84100 ASSAY OF PHOSPHORUS: CPT

## 2018-10-21 PROCEDURE — 99215 OFFICE O/P EST HI 40 MIN: CPT | Mod: GC,,, | Performed by: PSYCHIATRY & NEUROLOGY

## 2018-10-21 PROCEDURE — 85025 COMPLETE CBC W/AUTO DIFF WBC: CPT

## 2018-10-21 PROCEDURE — G8998 SWALLOW D/C STATUS: HCPCS | Mod: CH

## 2018-10-21 PROCEDURE — G0378 HOSPITAL OBSERVATION PER HR: HCPCS

## 2018-10-21 PROCEDURE — G8979 MOBILITY GOAL STATUS: HCPCS | Mod: CJ

## 2018-10-21 PROCEDURE — 25000003 PHARM REV CODE 250: Performed by: PHYSICIAN ASSISTANT

## 2018-10-21 PROCEDURE — 63600175 PHARM REV CODE 636 W HCPCS: Performed by: HOSPITALIST

## 2018-10-21 PROCEDURE — 92610 EVALUATE SWALLOWING FUNCTION: CPT

## 2018-10-21 PROCEDURE — G8978 MOBILITY CURRENT STATUS: HCPCS | Mod: CK

## 2018-10-21 PROCEDURE — 83735 ASSAY OF MAGNESIUM: CPT

## 2018-10-21 PROCEDURE — 99226 PR SUBSEQUENT OBSERVATION CARE,LEVEL III: CPT | Mod: ,,, | Performed by: INTERNAL MEDICINE

## 2018-10-21 PROCEDURE — 95819 EEG AWAKE AND ASLEEP: CPT | Mod: 26,,, | Performed by: PSYCHIATRY & NEUROLOGY

## 2018-10-21 PROCEDURE — 95816 EEG AWAKE AND DROWSY: CPT

## 2018-10-21 PROCEDURE — 80053 COMPREHEN METABOLIC PANEL: CPT

## 2018-10-21 PROCEDURE — 25000003 PHARM REV CODE 250: Performed by: HOSPITALIST

## 2018-10-21 PROCEDURE — 92523 SPEECH SOUND LANG COMPREHEN: CPT

## 2018-10-21 PROCEDURE — 36415 COLL VENOUS BLD VENIPUNCTURE: CPT

## 2018-10-21 PROCEDURE — G8996 SWALLOW CURRENT STATUS: HCPCS | Mod: CH

## 2018-10-21 PROCEDURE — G8997 SWALLOW GOAL STATUS: HCPCS | Mod: CH

## 2018-10-21 RX ORDER — LANOLIN ALCOHOL/MO/W.PET/CERES
400 CREAM (GRAM) TOPICAL EVERY 4 HOURS
Status: COMPLETED | OUTPATIENT
Start: 2018-10-21 | End: 2018-10-21

## 2018-10-21 RX ORDER — AMLODIPINE BESYLATE 5 MG/1
5 TABLET ORAL ONCE
Status: COMPLETED | OUTPATIENT
Start: 2018-10-21 | End: 2018-10-21

## 2018-10-21 RX ORDER — LISINOPRIL 10 MG/1
10 TABLET ORAL DAILY
Status: DISCONTINUED | OUTPATIENT
Start: 2018-10-21 | End: 2018-10-21

## 2018-10-21 RX ORDER — POTASSIUM CHLORIDE 20 MEQ/15ML
20 SOLUTION ORAL ONCE
Status: COMPLETED | OUTPATIENT
Start: 2018-10-21 | End: 2018-10-21

## 2018-10-21 RX ORDER — METOPROLOL TARTRATE 1 MG/ML
5 INJECTION, SOLUTION INTRAVENOUS EVERY 6 HOURS PRN
Status: DISCONTINUED | OUTPATIENT
Start: 2018-10-21 | End: 2018-10-22 | Stop reason: HOSPADM

## 2018-10-21 RX ORDER — CARVEDILOL 6.25 MG/1
6.25 TABLET ORAL 2 TIMES DAILY
Status: DISCONTINUED | OUTPATIENT
Start: 2018-10-21 | End: 2018-10-22 | Stop reason: HOSPADM

## 2018-10-21 RX ADMIN — CLOPIDOGREL 75 MG: 75 TABLET, FILM COATED ORAL at 09:10

## 2018-10-21 RX ADMIN — ATORVASTATIN CALCIUM 40 MG: 20 TABLET, FILM COATED ORAL at 09:10

## 2018-10-21 RX ADMIN — LEVETIRACETAM 500 MG: 500 TABLET ORAL at 09:10

## 2018-10-21 RX ADMIN — MAGNESIUM OXIDE TAB 400 MG (241.3 MG ELEMENTAL MG) 400 MG: 400 (241.3 MG) TAB at 09:10

## 2018-10-21 RX ADMIN — APIXABAN 5 MG: 5 TABLET, FILM COATED ORAL at 09:10

## 2018-10-21 RX ADMIN — MAGNESIUM OXIDE TAB 400 MG (241.3 MG ELEMENTAL MG) 400 MG: 400 (241.3 MG) TAB at 02:10

## 2018-10-21 RX ADMIN — MAGNESIUM OXIDE TAB 400 MG (241.3 MG ELEMENTAL MG) 400 MG: 400 (241.3 MG) TAB at 05:10

## 2018-10-21 RX ADMIN — POTASSIUM CHLORIDE 20 MEQ: 20 SOLUTION ORAL at 09:10

## 2018-10-21 RX ADMIN — CARVEDILOL 6.25 MG: 6.25 TABLET, FILM COATED ORAL at 09:10

## 2018-10-21 RX ADMIN — CETIRIZINE HYDROCHLORIDE 10 MG: 10 TABLET, FILM COATED ORAL at 09:10

## 2018-10-21 RX ADMIN — AMLODIPINE BESYLATE 5 MG: 5 TABLET ORAL at 09:10

## 2018-10-21 RX ADMIN — AMLODIPINE BESYLATE 5 MG: 5 TABLET ORAL at 02:10

## 2018-10-21 RX ADMIN — CEFTRIAXONE SODIUM 1 G: 1 INJECTION, POWDER, FOR SOLUTION INTRAMUSCULAR; INTRAVENOUS at 05:10

## 2018-10-21 RX ADMIN — OLMESARTAN MEDOXOMIL 40 MG: 20 TABLET, FILM COATED ORAL at 09:10

## 2018-10-21 NOTE — ASSESSMENT & PLAN NOTE
Stroke risk factor  [id number]  New this admission; Primary team suspects provoked by infection  Pt on Eliquis currently; Unclear if this was planned for 3-6 month duration in setting of PE, but would either consider further cardiac monitoring on outpatient basis vs long-term AC due to high stroke risk, prior hx clots  Telemetry

## 2018-10-21 NOTE — PT/OT/SLP EVAL
Speech Language Pathology Evaluation  Cognitive/Bedside Swallow  Discharge    Patient Name:  Isa Sewell   MRN:  [id number]   959/959 A    Admitting Diagnosis: Seizure    Recommendations:                  General Recommendations:  Follow-up not indicated  Diet recommendations:  Regular, Thin   Aspiration Precautions: Standard aspiration precautions   General Precautions: Standard,    Communication strategies:  none    History:     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/2016    Seizure 10/19/2018       Past Surgical History:   Procedure Laterality Date    BREAST BIOPSY Bilateral     BOTH BENIGN    CHOLECYSTECTOMY      FRACTURE SURGERY Right     ankle    HYSTERECTOMY      TONSILLECTOMY       PA Note 10/20:   Thrombotic stroke involving right middle cerebral artery     87 y.o. female with PMHx PE/DVT, prior R MCA stroke (baseline LSW and sowmya-hypoesthesia), HTN, DM and HLD who is admitted for seizure found with new imaging finding concerning for acute cerebral infarction.      Reviewed imaging with staff Sharmaine; ADC does not clearly correlate with new area of restricted diffusion noted on MRI Brain. In setting of pt with recent episode of seizure, findings may represent artifact caused by pt's recent seizure activity. We suspect if this abnormality is in fact an acute infarct, it likely did not cause her symptoms prompting admission. If ADC does correlate, it is exceptionally faint, suggesting infarct would have likely occurred 7-10 days ago; though pt denies any new or worsening weakness, numbness, vision changes, or language/speech difficulty. I discussed these thoughts/findings with pt and family at bedside.     Either way, this result would not change current management. Please continue current  treatment plan as below (see New-onset AFib section.) Stroke team will continue to follow.     MRI Brain 10/20: Small acute lacunar type infarction in the right corona radiata, adjacent to the old right MCA vascular territory infarction.  No significant mass effect or acute intracranial hemorrhage.  This report was flagged in Epic as abnormal.    Modified Barium Swallow: Completed 6/13/2018    Chest X-Ray 10/19: 1. Possible trace left pleural effusion with associated compressive atelectasis.  Developing consolidation is a consideration noting shallow inspiratory effort limits evaluation.    Prior diet: Patient on a regular diet and thin liquids at baseline    Subjective     Patient found awake with daughter present in room. RN entered room during ST evaluation to administer medications.   Patient goals: none stated    Pain/Comfort:  · Pain Rating 1: 0/10    Objective:     COGNITIVE STATUS:  Behavioral Observations: alert and cooperative  Memory:  · Immediate: % accuracy with all number and word lists  · Short Term: WFL recalled 3/3 words s/p a 5 minute filled delay given min cues (baseline)  · Long Term: % accuracy  Orientation: Oriented x4   Attention: WFL  Problem Solving: % accuracy  Insight Awareness: pt with good insight   Sequencing: % accuracy with 4 word mental manipulation task; pt with accurate sequencing of functional events  Pragmatics: WNL  Money/time management: WFL     LANGUAGE:     Receptive Language:   Complex y/n Questions: % accuracy  Identification: % accuracy  1 Step Directions: WFL   2 Step Directions: WFL   Complex Directions: WFL     Expressive Language:  Naming: named 15 animals in 1 minute (norm 15-20)  Automatic Speech: WFL   Sentence Completion: WFL   Responsive Speech: WFL   Repetition: WFL      Motor Speech: Mild Dysarthria; baseline per patient and chart review    Voice: adequate volumate, rate, prosody, breath support     Augmentative  Alternative Communication: no    Oral Musculature Evaluation  · Oral Musculature: WFL  · Dentition: present and adequate  · Mucosal Quality: good  · Mandibular Strength and Mobility: WFL  · Oral Labial Strength and Mobility: WFL  · Lingual Strength and Mobility: WFL  · Velar Elevation: WFL  · Buccal Strength and Mobility: decreased tone(c/w age)  · Volitional Cough: elicited  · Volitional Swallow: timely  · Voice Prior to PO Intake: clear  · Oral Musculature Comments: patietn with a constant dry cough at baseline per chart review and family report    Bedside Swallow Eval:   Consistencies Assessed:  · Thin liquids ips of water via cup and straw x5 ounces  · Puree tsp bites of pudding x1  · Solids bites of veronica cracker x2  · RN administered hole po medications 1 at a time with liquid wash via straw  · Patient reports eating breakfast prior to ST entry with no coughing, choking, throat clearing, or any difficulties. Daughter confirms.      Oral Phase:   · WFL     Pharyngeal Phase:   · no overt clinical signs/symptoms of aspiration  · no overt clinical signs/symptoms of pharyngeal dysphagia  · Patient presents with a dry cough in the absence of po intake and upon completion of all po trials. Per patient, daughter, and chart review, this patient with a dry cough at baseline     Compensatory Strategies  · None     Treatment: SLP provided patient education on SLP role, s/s and risks of aspiraiton, safe swallow precautions, and POC. Patient v/u of all discussed. White board updated.       Assessment:     Isa Sewell is a 87 y.o. female with an SLP diagnosis of mild Dysarthria. Patient presents at baseline for swallowing, speech, language, and cognition. No further skilled acute ST services warranted at this time. Please re-consult as needed.     Goals:   Multidisciplinary Problems     SLP Goals     Not on file          Multidisciplinary Problems (Resolved)        Problem: SLP Goal    Goal Priority Disciplines Outcome    SLP Goal   (Resolved)     SLP Outcome(s) achieved   Description:  Speech Therapy Short Term Goals  Goal expected to be met by 10/29  1. Pt will participate in a bedside swallow study to determine the safest and least restrictive possible po diet with possible updated goals to follow pending results. -MET  Pt will participate in a speech, language, and cognitive evaluation with possible updated goals to follow pending results.-MET                      Plan:     · Patient to be seen:      · Plan of Care expires:     · Plan of Care reviewed with:  patient, daughter   · SLP Follow-Up:  No       Discharge recommendations:  Discharge Facility/Level Of Care Needs: (no ST needs)   Barriers to Discharge:  None per ST discipline    Time Tracking:     SLP Treatment Date:   10/21/18  Speech Start Time:  0922  Speech Stop Time:  0947     Speech Total Time (min):  25 min    Billable Minutes: Eval 15  and Eval Swallow and Oral Function 10    JORGE Henry, CCC-SLP   Pager: [phone]  10/21/2018

## 2018-10-21 NOTE — ASSESSMENT & PLAN NOTE
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  - Sept 2018  - Continue Eliquis 5mg PO BID

## 2018-10-21 NOTE — CONSULTS
[facility]  Vascular Neurology  Comprehensive Stroke Center  Consult Note    Inpatient consult to Vascular (Stroke) Neurology  Consult performed by: Farheen Mckeon PA-C  Consult ordered by: Tonya Garcias PA-C        Assessment/Plan:     Patient is a 87 y.o. year old female with:    * Seizure    Reason for admission  Per primary, Gen Neuro following     Thrombotic stroke involving right middle cerebral artery    87 y.o. female with PMHx PE/DVT, prior R MCA stroke (baseline LSW and sowmya-hypoesthesia), HTN, DM and HLD who is admitted for seizure found with new imaging finding concerning for acute cerebral infarction.     Reviewed imaging with staff Sharmaine; ADC does not clearly correlate with new area of restricted diffusion noted on MRI Brain. In setting of pt with recent episode of seizure, findings may represent artifact caused by pt's recent seizure activity. We suspect if this abnormality is in fact an acute infarct, it likely did not cause her symptoms prompting admission. If ADC does correlate, it is exceptionally faint, suggesting infarct would have likely occurred 7-10 days ago; though pt denies any new or worsening weakness, numbness, vision changes, or language/speech difficulty. I discussed these thoughts/findings with pt and family at bedside.    Either way, this result would not change current management. Please continue current treatment plan as below (see New-onset AFib section.) Stroke team will continue to follow.      Antithrombotics for secondary stroke prevention: Antiplatelets: Clopidogrel: 75 mg daily  Anticoagulants: Apixaban 5 mg BID   Statins for secondary stroke prevention and hyperlipidemia, if present:   Statins: Atorvastatin- 40 mg daily  Aggressive risk factor modification: HTN, DM, HLD, Diet, Exercise, Obesity, A-Fib, CAD, prior stroke  Rehab efforts: PT/OT/SLP to evaluate and treat, PM&R consult   Diagnostics ordered/pending: None   VTE prophylaxis: Eliquis,  SCDs  BP parameters: Infarct: Normotensive, per primary     New onset a-fib    Stroke risk factor  [id number]  New this admission; Primary team suspects provoked by infection  Pt on Eliquis currently; Unclear if this was planned for 3-6 month duration in setting of PE, but would either consider further cardiac monitoring on outpatient basis vs long-term AC due to high stroke risk, prior hx clots  Telemetry     Hyperlipidemia    Stroke risk factor    Continue home Atorvastatin     Benign essential HTN    Stroke risk factor  SBP Normotensive  Per primary     Hx of ischemic right MCA stroke    Stroke risk factor  Pt with LSW, sowmya-hypoesthesias at baseline; Pt reports no recent worsening  Eliquis, Plavix, statin     Acute saddle pulmonary embolism without acute cor pulmonale    Stroke risk factor  Sept 2018  Continue Eliquis 5mg PO BID  Per primary     Left-sided weakness    2/2 prior stroke; Pt reports no worsening  Consider PT, OT eval and treat         STROKE DOCUMENTATION          NIH Scale:  1a. Level Of Consciousness: 0-->Alert: keenly responsive  1b. LOC Questions: 0-->Answers both questions correctly  1c. LOC Commands: 0-->Performs both tasks correctly  2. Best Gaze: 0-->Normal  3. Visual: 0-->No visual loss  4. Facial Palsy: 1-->Minor paralysis (flattened nasolabial fold, asymmetry on smiling)  5a. Motor Arm, Left: 1-->Drift: limb holds 90 (or 45) degrees, but drifts down before full 10 seconds: does not hit bed or other support  5b. Motor Arm, Right: 0-->No drift: limb holds 90 (or 45) degrees for full 10 secs  6a. Motor Leg, Left: 1-->Drift: leg falls by the end of the 5-sec period but does not hit bed  6b. Motor Leg, Right: 0-->No drift: leg holds 30 degree position for full 5 secs  7. Limb Ataxia: 0-->Absent  8. Sensory: 1-->Mild-to-moderate sensory loss: patient feels pinprick is less sharp or is dull on the affected side: or there is a loss of superficial pain with pinprick, but patient is  aware of being touched  9. Best Language: 0-->No aphasia: normal  10. Dysarthria: 1-->Mild-to-moderate dysarthria: patient slurs at least some words and, at worst, can be understood with some difficulty  11. Extinction and Inattention (formerly Neglect): 0-->No abnormality  Total (NIH Stroke Scale): 5    Modified Kenzie Score: 2  Wendell Coma Scale:    ABCD2 Score:    [id number] Score:   HAS -BLED Score:   ICH Score:   Hunt & Morales Classification:       Thrombolysis Candidate? No, Out of window       Interventional Revascularization Candidate?   Is the patient eligible for mechanical endovascular reperfusion (MARCO)?  No; No significant neurological deficit      Hemorrhagic change of an Ischemic Stroke: Does this patient have an ischemic stroke with hemorrhagic changes? No     Subjective:     History of Present Illness:  Isa Sewell is a 87 y.o. female with PMHx PE/DVT, prior R MCA stroke (baseline LSW and sowmya-hypoesthesia), HTN, DM and HLD who is being evaluated by the Vascular Neurology service for imaging finding concerning for possible acute infarction. Pt is admitted after having an episode concerning for seizure activity on 10/19 and being found to have a UTI while in ED. Pt denies any new or worsening weakness, numbness, vision changes, or language/speech difficulty.  No prior hx seizures.  Pt is on Eliquis and Plavix at home for PE/DVT which happened in 9/2018. She lives with others and performs all ADLs independently. Pt denies tobacco, alcohol, or drug use.     Of note, pt found to be in new-onset atrial fibrillation. Primary team suspects infection-induced but continuing AC.        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/2016    Seizure 10/19/2018     Past Surgical History:   Procedure  Laterality Date    BREAST BIOPSY Bilateral     BOTH BENIGN    CHOLECYSTECTOMY      FRACTURE SURGERY Right     ankle    HYSTERECTOMY      TONSILLECTOMY       Family History   Problem Relation Age of Onset    Melanoma Neg Hx     Psoriasis Neg Hx     Lupus Neg Hx     Eczema Neg Hx      Social History     Tobacco Use    Smoking status: Never Smoker    Smokeless tobacco: Never Used   Substance Use Topics    Alcohol use: No    Drug use: No     Review of patient's allergies indicates:   Allergen Reactions    Sulfa (sulfonamide antibiotics) Hives       Medications: I have reviewed the current medication administration record.    Medications Prior to Admission   Medication Sig Dispense Refill Last Dose    amlodipine (NORVASC) 5 MG tablet Take 5 mg by mouth once daily.   10/19/2018    apixaban (ELIQUIS) 5 mg (74 tabs) DsPk For the first 7 days take two 5 mg tablets twice daily.  After 7 days take one 5 mg tablet twice daily. 74 tablet 0 10/19/2018    atorvastatin (LIPITOR) 40 MG tablet Take 1 tablet (40 mg total) by mouth once daily. 90 tablet 3 10/19/2018    cetirizine (ZYRTEC) 10 MG tablet Take 10 mg by mouth once daily.   10/19/2018    clobetasol 0.05% (TEMOVATE) 0.05 % Oint APPLY TO AFFECTED AREA TWICE DAILY. AVOID FACE, ARMPITS, AND GROIN. 60 g 2 Taking    clopidogrel (PLAVIX) 75 mg tablet Take 1 tablet (75 mg total) by mouth once daily. 30 tablet 11 10/19/2018    melatonin 3 mg Tab Take 1 tablet (3 mg total) by mouth every evening. 90 tablet 3 10/18/2018    olmesartan (BENICAR) 40 MG tablet Take 1 tablet (40 mg total) by mouth once daily. 90 tablet 3 10/19/2018    phenazopyridine (PYRIDIUM) 200 MG tablet Take 200 mg by mouth 3 (three) times daily as needed for Pain.   Taking    triamcinolone acetonide 0.1% (KENALOG) 0.1 % ointment   3        Review of Systems   Constitutional: Negative for chills and fever.   HENT: Negative for nosebleeds and trouble swallowing.    Eyes: Negative for discharge  and visual disturbance.   Respiratory: Negative for choking and stridor.    Cardiovascular: Positive for leg swelling. Negative for chest pain.   Gastrointestinal: Negative for diarrhea and vomiting.   Musculoskeletal: Negative for gait problem and neck stiffness.   Skin: Negative for pallor and rash.   Neurological: Positive for seizures, facial asymmetry, speech difficulty, weakness and numbness.   Psychiatric/Behavioral: Negative for agitation, behavioral problems and confusion.     Objective:     Vital Signs (Most Recent):  Temp: 98.8 °F (37.1 °C) (10/20/18 1726)  Pulse: (!) 59 (10/20/18 1920)  Resp: 16 (10/20/18 1726)  BP: (!) 151/69 (10/20/18 1726)  SpO2: (!) 93 % (10/20/18 1726)    Vital Signs Range (Last 24H):  Temp:  [97.2 °F (36.2 °C)-98.8 °F (37.1 °C)]   Pulse:  [55-70]   Resp:  [15-24]   BP: (102-168)/(45-80)   SpO2:  [92 %-97 %]     Physical Exam   Constitutional: She is oriented to person, place, and time. She appears well-developed and well-nourished. No distress.   HENT:   Head: Normocephalic and atraumatic.   Eyes: Conjunctivae and EOM are normal.   Cardiovascular: Normal rate.   Pulmonary/Chest: Effort normal. No respiratory distress.   Musculoskeletal: She exhibits no tenderness or deformity.   Neurological: She is alert and oriented to person, place, and time. A cranial nerve deficit and sensory deficit is present. She exhibits normal muscle tone. Coordination normal.   Skin: Skin is warm and dry.   Psychiatric: She has a normal mood and affect. Her behavior is normal.       Neurological Exam:   LOC: alert  Attention Span: Good   Language: No aphasia  Articulation: Mild dysarthria (baseline)  Orientation: Person, Place, Time   Visual Fields: Full  EOM (CN III, IV, VI): Full/intact  Facial Movement (CN VII): Lower facial weakness on the Left  Motor: Arm left  Paresis: 4/5  Leg left  Paresis: 4/5  Arm right  Normal 5/5  Leg right Normal 5/5  Cebellar: No evidence of appendicular or axial  ataxia  Sensation: Fawad-hypoesthesia left  Tone: Normal tone throughout      Laboratory:  CMP:   Recent Labs   Lab 10/20/18  0351   CALCIUM 8.9   ALBUMIN 3.2*   PROT 5.9*      K 3.9   CO2 23   *   BUN 14   CREATININE 0.7   ALKPHOS 86   ALT 13   AST 16   BILITOT 0.3     CBC:   Recent Labs   Lab 10/20/18  0351   WBC 5.61   RBC 3.88*   HGB 10.8*   HCT 35.0*      MCV 90   MCH 27.8   MCHC 30.9*     Lipid Panel: No results for input(s): CHOL, LDLCALC, HDL, TRIG in the last 168 hours.  Coagulation:   Recent Labs   Lab 10/19/18  1641   INR 0.9     Hgb A1C: No results for input(s): HGBA1C in the last 168 hours.  TSH: No results for input(s): TSH in the last 168 hours.    Diagnostic Results:      Brain imaging:    MRI Brain 10/20/18    Small acute lacunar type infarction in the right corona radiata, adjacent to the old right MCA vascular territory infarction.  No significant mass effect or acute intracranial hemorrhage.    CT Head 10/19/18  1. No acute intracranial abnormality.  2. Right MCA territory encephalomalacia consistent with prior infarct.  Remote lacunar infarcts within the right basal ganglia.  3. Senescent changes.      Farheen Mckeon PA-C  Comprehensive Stroke Center  Department of Vascular Neurology   [facility]

## 2018-10-21 NOTE — PROGRESS NOTES
[facility] Hospital Medicine  Progress Note    Patient Name: Isa Sewell  MRN: [id number]  Patient Class: OP- Observation   Admission Date: 10/19/2018  Length of Stay: 0 days  Attending Physician: JOS ÉMIGUEL Oliva MD  Primary Care Provider: Dariel Obregon MD    [facility] Medicine Team: [facility] V Tonya Garcias PA-C    Subjective:     Principal Problem:Seizure    HPI:  Ms. Isa Sewell is a 87 y.o. 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  The patient does not remember the events leading up to admission, but was told that she was at the hair salon when she felt numbness on her face, made the sign of the cross, and then developed profuse shaking of both upper extremities and her lip.  The event lasted for about 3-4 minutes, and was followed by a period of alteration in consciousness.  There was no bowel or bladder incontinence.  EMS was called, and on arrival the patient was alert but drowsy.  She was initially placed on non-rebreather for low oxygen sats, and was transitioned to a nasal cannula shortly after.  The patient denies any history of seizures, changes in medications, dysuria, numbness or tingling in the extremities.     In the emergency department, she was found to have a urinary tract infection and have new onset atrial fibrillation, rate controlled with HR 90s.  She was given ceftriaxone and was admitted to Hospital Medicine for further management.    Hospital Course:  87F, hx CVA, admitted to OBS for management and evaluation of possible seizure activity and UTI. Continue Rocephin,UCx preliminary result E. Coli. Keppra 500mg BID and 1hr EEG (pending) monitoring per neuro recs. MRI brain shows small acute cerebral infarct adjacent  "to old infarction. [facility] Neuro notified. SLP evaluated patient, no further recs at this time. PT rec outpatient PT. PM&R, OT recs pending.     No new subjective & objective note has been filed under this hospital service since the last note was generated.    Assessment/Plan:      * Seizure    - Unclear at this time if new onset seizure, or if provoked by UTI  - Will give Keppra 1g IV x 1  - Seizures precautions  - Epilepsy consulted, appreciate assistance  10/20:   - MRI brain shows "acute lacunar type infarction in the right corona radiata, adjacent to the old right MCA vascular territory infarction.  No significant mass effect or acute intracranial hemorrhage"  - [facility] Neuro consulted (see below)  - Continue levetiracetam 500mg BID per neuro recs, appreciated  10/21: EEG pending     Thrombotic stroke involving right middle cerebral artery     MRI brain shows "acute lacunar type infarction in the right corona radiata, adjacent to the old right MCA vascular territory infarction.  No significant mass effect or acute intracranial hemorrhage"    - Per [facility] Neuro, imaging suggestive of small infarct," likely not cause of symptoms prompting admission"; continue current home regimen (plavix, eliquis, statin), recs appreciated  - 2DCFD with bubble pending  - SLP evaluation, no further management recommendations at this time  - PMR recommendations pending  - PT recommending SNF     Benign essential HTN    - Chronic and stable  - Continue Norvasc 5mg PO daily  - Continue Olmesartan 40mg PO daily  10/20: stable  10/21: Hypertensive 180-190s/70-80s; start Coreg 6.25mg PO BID  PRN Metoprolol 5mg IV for sustained SBP >170     Acute cystitis without hematuria    - UA dirty.  F/u urine gram stain and culture  - Start Ceftriaxone 1g IV q24h (start date 10/19)  10/20: stain shows gram neg rods; UCx preliminary E.Coli  10/21:Continue Ceftriaxone; sensitivities pending     New onset a-fib    - Likely infection induced  - [id number] " score: 6  - Continue tele  - Continue Eliquis 5mg PO BID  - Will hold on repeat echo at this time given normal BNP  10/20: NSR on tele; will not pursue further work up at this time  10/21: 2DCFD with bubble contract pending (ordered in setting of acute cerebral infarct)     Hypokalemia    - K 3.3, check Mag  - Replace with PO  10/20: 3.3 > 3.9, replace  Mg 1.7; replace  10/21: K 3.9, replace PO; Mg 1.9, replace PO     Hx of ischemic right MCA stroke    L sided weakness  Hyperlipidemia  - Sept 2018  - Continue Eliquis 5mg PO BID  - Continue Plavix 75mg PO daily  - Continue Lipitor 40mg PO daily  10/21: PT recommending SNF     Chronic deep vein thrombosis (DVT) of femoral vein of left lower extremity    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  - Sept 2018  - Continue Eliquis 5mg PO BID     Hyperlipidemia    - Chronic and stable  - Continue Lipitor 40mg PO daily       VTE Risk Mitigation (From admission, onward)        Ordered     apixaban tablet 5 mg  2 times daily      10/19/18 1938            Discussed with Staff.  Tonya Garcias PA-C  Department of Hospital Medicine   [facility]

## 2018-10-21 NOTE — ASSESSMENT & PLAN NOTE
- K 3.3, check Mag  - Replace with PO  10/20: 3.3 > 3.9, replace  Mg 1.7; replace  10/21: K 3.9, replace PO; Mg 1.9, replace PO

## 2018-10-21 NOTE — ASSESSMENT & PLAN NOTE
- Chronic and stable  - Continue Norvasc 5mg PO daily  - Continue Olmesartan 40mg PO daily  10/20: stable  10/21: Hypertensive 180-190s/70-80s; start Coreg 6.25mg PO BID  PRN Metoprolol 5mg IV for sustained SBP >170  10/22: BP stable, script sent for Coreg 6.25mg PO BID and PCP f/u

## 2018-10-21 NOTE — ASSESSMENT & PLAN NOTE
Stroke risk factor  Pt with LSW, sowmya-hypoesthesias at baseline; Pt reports no recent worsening  Eliquis, Plavix, statin

## 2018-10-21 NOTE — PT/OT/SLP EVAL
Physical Therapy Evaluation    Patient Name:  Isa Sewell   MRN:  [id number]    Recommendations:     Discharge Recommendations:  outpatient PT(family assist PRN)   Discharge Equipment Recommendations: none   Barriers to discharge: None    Assessment:     Isa Sewell is a 87 y.o. female admitted with a medical diagnosis of Seizure.  She presents with the following impairments/functional limitations:  weakness, gait instability, impaired endurance, impaired balance, impaired functional mobilty. Pt performed bed mobility and transfers CGA. Pt amb ~30ft then ~15ft to and from bathroom CGA without AD, decreased jose; no LOB and mild SOB. Pt will benefit from skilled PT to improve deficits and increase overall functional mobility.     Rehab Prognosis:  Good; patient would benefit from acute skilled PT services to address these deficits and reach maximum level of function.      Recent Surgery: * No surgery found *      Plan:     During this hospitalization, patient to be seen 3 x/week to address the above listed problems via gait training, therapeutic activities, therapeutic exercises, neuromuscular re-education  · Plan of Care Expires:  11/21/18   Plan of Care Reviewed with: patient, daughter    Subjective     Communicated with RN prior to session.  Patient found supine in bed and daughter present upon PT entry to room, agreeable to evaluation.      Chief Complaint: fatigue  Patient comments/goals: return home  Pain/Comfort:  · Pain Rating 1: 0/10  · Pain Rating Post-Intervention 1: 0/10    Patients cultural, spiritual, Religion conflicts given the current situation:      Living Environment:  Pt lives with  and daughter in 1-story house without MARISOL and walk-in shower with built-in seat. Pt reports (I) with household amb and ADLs and uses RW for community amb PRN. Pt daughter reports she and her sisters rotate providing 24hr supervision/assist. Pt current with OP PT and OT.   Prior to admission,  patients level of function was (I).  Patient has the following equipment: walker, rolling, cane, straight.  DME owned (not currently used): single point cane.  Upon discharge, patient will have assistance from daughters and .    Objective:     Patient found with: oxygen, telemetry     General Precautions: Standard, fall, seizure   Orthopedic Precautions:N/A   Braces: N/A     Exams:  · Cognitive Exam:  Patient is oriented to Person, Place, Time and Situation  · Gross Motor Coordination:  WFL  · Postural Exam:  Patient presented with the following abnormalities: -       Rounded shoulders  · Sensation:    · -       Intact  light/touch B LE  · Skin Integrity/Edema:      · -       Skin integrity: Visible skin intact  · RLE ROM: WFL  · RLE Strength: WFL  · LLE ROM: WFL  · LLE Strength: WFL    Functional Mobility:  Bed Mobility:     · Supine to Sit: contact guard assistance    Transfers:     · Sit to Stand:  contact guard assistance with no AD  · Toilet Transfer: contact guard assistance with  no AD  using  Step Transfer    Gait: ~30ft then ~15ft to and from bathroom CGA without AD, decreased jose; no LOB and mild SOB    AM-PAC 6 CLICK MOBILITY  Total Score:19       Therapeutic Activities and Exercises:  Pt sat EOB with S.  Pt performed toileting with S and stood at sink and performed handwashing with CGA.  PT placed BSC over toilet at end of session for ease of transfer 2* low seat height  Pt and daughter educated on:  -role of PT/POC  -safety with mobility  -importance of OOB activity  -up in chair for majority of the day  Pt reports amb close to baseline but felt at little more fatigued today.  Pt and daughter agreeable to return to OP PT.    Pt safe to amb with family or RN staff.     Patient left up in chair with all lines intact, call button in reach, RN notified and daughter present.    GOALS:   Multidisciplinary Problems     Physical Therapy Goals        Problem: Physical Therapy Goal    Goal Priority  Disciplines Outcome Goal Variances Interventions   Physical Therapy Goal     PT, PT/OT Ongoing (interventions implemented as appropriate)     Description:  Goals to be met by: 10/31/2018     Patient will increase functional independence with mobility by performin. Supine to sit with Supervision  2. Sit to supine with Supervision  3. Sit to stand transfer with Supervision  4. Gait  x 150 feet with Supervision with or without appropriate AD.   5. Lower extremity exercise program x15 reps per handout, with supervision                      History:     Past Medical History:   Diagnosis Date    Bullous pemphigoid     pemphigoid nodularis variant    DVT (deep venous thrombosis)     Dysphagia 2018    Embolic stroke involving right middle cerebral artery 2018    Hypertension     Pemphigus 2016    Seizure 10/19/2018       Past Surgical History:   Procedure Laterality Date    BREAST BIOPSY Bilateral     BOTH BENIGN    CHOLECYSTECTOMY      FRACTURE SURGERY Right     ankle    HYSTERECTOMY      TONSILLECTOMY         Clinical Decision Making:     Decision Making/ Complexity Score   On examination of body system using standardized tests and measures patient presents with 1-2 elements from any of the following: body structures and functions, activity limitations, and/or participation restrictions.  Leading to a clinical presentation that is considered stable and/or uncomplicated                              Clinical Decision Making  (Eval Complexity):  Low- 27515     Time Tracking:     PT Received On: 10/21/18  PT Start Time: 49     PT Stop Time: 1006  PT Total Time (min): 17 min     Billable Minutes: Evaluation 17      ANGEL CALDERON, PT  10/21/2018

## 2018-10-21 NOTE — PLAN OF CARE
Problem: Physical Therapy Goal  Goal: Physical Therapy Goal  Goals to be met by: 10/31/2018     Patient will increase functional independence with mobility by performin. Supine to sit with Supervision  2. Sit to supine with Supervision  3. Sit to stand transfer with Supervision  4. Gait  x 150 feet with Supervision with or without appropriate AD.   5. Lower extremity exercise program x15 reps per handout, with supervision    Outcome: Ongoing (interventions implemented as appropriate)  Pt evaluation complete. Pt goals set.    ANGEL CALDERON, PT  10/21/2018

## 2018-10-21 NOTE — HPI
Isa Sewell is a 87 y.o. female with PMHx PE/DVT, prior R MCA stroke (baseline LSW and oswmya-hypoesthesia), HTN, DM and HLD who is being evaluated by the Vascular Neurology service for imaging finding concerning for possible acute infarction. Pt is admitted after having an episode concerning for seizure activity on 10/19 and being found to have a UTI while in ED. Pt denies any new or worsening weakness, numbness, vision changes, or language/speech difficulty. No prior hx seizures.  Pt is on Eliquis and Plavix at home for PE/DVT which happened in 9/2018. She lives with others and performs all ADLs independently. Pt denies tobacco, alcohol, or drug use.     Of note, pt found to be in new-onset atrial fibrillation. Primary team suspects infection-induced but continuing AC.

## 2018-10-21 NOTE — SUBJECTIVE & OBJECTIVE
Subjective:     Interval History: MRI brain showed an infarct in the right corona radiata. Vascular Neurology following.  EEG pending    Current Neurological Medications:     Current Facility-Administered Medications   Medication Dose Route Frequency Provider Last Rate Last Dose    acetaminophen tablet 650 mg  650 mg Oral Q8H PRN Elenita Sweeney MD        amLODIPine tablet 5 mg  5 mg Oral Daily Elenita Sweeney MD   5 mg at 10/21/18 0928    amLODIPine tablet 5 mg  5 mg Oral Daily Tonya Garcias PA-C        apixaban tablet 5 mg  5 mg Oral BID Elenita Sweeney MD   5 mg at 10/21/18 0928    atorvastatin tablet 40 mg  40 mg Oral Daily Elenita Sweeney MD   40 mg at 10/21/18 0929    cefTRIAXone injection 1 g  1 g Intravenous Q24H Elenita Sweeney MD   1 g at 10/20/18 1651    cetirizine tablet 10 mg  10 mg Oral Daily Elenita Sweeney MD   10 mg at 10/21/18 0929    clopidogrel tablet 75 mg  75 mg Oral Daily Elenita Sweeney MD   75 mg at 10/21/18 0929    dextrose 50% injection 12.5 g  12.5 g Intravenous PRN Elenita Sweeney MD        dextrose 50% injection 25 g  25 g Intravenous PRN Elenita Sweeney MD        glucagon (human recombinant) injection 1 mg  1 mg Intramuscular PRN Elenita Sweeney MD        glucose chewable tablet 16 g  16 g Oral PRN Elenita Sweeney MD        glucose chewable tablet 24 g  24 g Oral PRN Elenita Sweeney MD        levETIRAcetam tablet 500 mg  500 mg Oral BID Vaibhav Mata MD   500 mg at 10/21/18 0929    lisinopril tablet 10 mg  10 mg Oral Daily Tonya Garcias PA-C        [START ON 10/22/2018] lisinopril tablet 10 mg  10 mg Oral Daily Tonya Garcias PA-C        magnesium oxide tablet 400 mg  400 mg Oral Q4H Tonya Garcias PA-C   400 mg at 10/21/18 0929    metoprolol injection 5 mg  5 mg Intravenous Q6H PRN Tonya Garcias PA-C        olmesartan tablet 40 mg  40 mg Oral Daily Elenita Sweeney MD   40 mg at 10/21/18 5771    ondansetron disintegrating  tablet 8 mg  8 mg Oral Q8H PRN Elenita Sweeney MD        ramelteon tablet 8 mg  8 mg Oral Nightly PRN Elenita Sweeney MD        senna-docusate 8.6-50 mg per tablet 1 tablet  1 tablet Oral BID PRN Elenita Sweeney MD        sodium chloride 0.9% flush 5 mL  5 mL Intravenous PRN Elenita Sweeney MD           Review of Systems   Constitutional: Negative for fever.   Neurological: Positive for speech difficulty, weakness and numbness. Negative for dizziness and light-headedness.     Objective:     Vital Signs (Most Recent):  Temp: 97.7 °F (36.5 °C) (10/21/18 1214)  Pulse: 72 (10/21/18 1214)  Resp: 20 (10/21/18 1214)  BP: (!) 198/87 (10/21/18 1214)  SpO2: 98 % (10/21/18 1214) Vital Signs (24h Range):  Temp:  [97.5 °F (36.4 °C)-98.8 °F (37.1 °C)] 97.7 °F (36.5 °C)  Pulse:  [55-72] 72  Resp:  [16-20] 20  SpO2:  [92 %-98 %] 98 %  BP: (149-198)/(66-87) 198/87     Weight: 72.6 kg (160 lb)  Body mass index is 27.46 kg/m².    Physical Exam   Constitutional: She is oriented to person, place, and time. No distress.   Eyes: EOM are normal. Pupils are equal, round, and reactive to light.   Neurological: She is alert and oriented to person, place, and time. She has a normal Finger-Nose-Finger Test.   Reflex Scores:       Bicep reflexes are 2+ on the right side and 2+ on the left side.       Brachioradialis reflexes are 2+ on the right side and 2+ on the left side.       Patellar reflexes are 2+ on the right side and 2+ on the left side.       Achilles reflexes are 2+ on the right side and 2+ on the left side.  Psychiatric: Her speech is slurred.       NEUROLOGICAL EXAMINATION:     MENTAL STATUS   Oriented to person, place, and time.   Oriented to person.   Oriented to place.   Oriented to time.   Speech: slurred   Level of consciousness: alert  Normal comprehension.     CRANIAL NERVES     CN III, IV, VI   Pupils are equal, round, and reactive to light.  Extraocular motions are normal.     CN VII   Facial expression full,  symmetric.     CN IX, X   Palate: symmetric    CN XII   CN XII normal.     MOTOR EXAM   Overall muscle tone: normal    Strength   Right deltoid: 5/5  Left deltoid: 4/5  Right biceps: 5/5  Left biceps: 4/5  Right quadriceps: 5/5  Left quadriceps: 4/5  Right peroneal: 5/5  Left peroneal: 4/5  Right gastroc: 5/5  Left gastroc: 4/5    REFLEXES     Reflexes   Right brachioradialis: 2+  Left brachioradialis: 2+  Right biceps: 2+  Left biceps: 2+  Right patellar: 2+  Left patellar: 2+  Right achilles: 2+  Left achilles: 2+    SENSORY EXAM   Left leg light touch: decreased from ankle    GAIT AND COORDINATION      Coordination   Finger to nose coordination: normal      Significant Labs:   Hemoglobin A1c: No results for input(s): HGBA1C in the last 720 hours.  Blood Culture: No results for input(s): LABBLOO in the last 48 hours.  BMP:   Recent Labs   Lab 10/19/18  1641 10/20/18  0351 10/21/18  0354   * 105 108    145 145   K 3.3* 3.9 3.9    113* 112*   CO2 25 23 24   BUN 15 14 17   CREATININE 0.9 0.7 0.8   CALCIUM 9.7 8.9 9.7   MG 1.7 1.7 1.8     CBC:   Recent Labs   Lab 10/19/18  1641 10/20/18  0351 10/21/18  0354   WBC 6.52 5.61 5.78   HGB 12.7 10.8* 11.7*   HCT 40.8 35.0* 37.9    203 212     CMP:   Recent Labs   Lab 10/19/18  1641 10/20/18  0351 10/21/18  0354   * 105 108    145 145   K 3.3* 3.9 3.9    113* 112*   CO2 25 23 24   BUN 15 14 17   CREATININE 0.9 0.7 0.8   CALCIUM 9.7 8.9 9.7   MG 1.7 1.7 1.8   PROT 7.1 5.9* 6.1   ALBUMIN 3.7 3.2* 3.2*   BILITOT 0.5 0.3 0.3   ALKPHOS 102 86 84   AST 18 16 18   ALT 17 13 16   ANIONGAP 11 9 9   EGFRNONAA 57.7* >60.0 >60.0     CSF Culture: No results for input(s): CSFCULTURE in the last 48 hours.  CSF Studies: No results for input(s): ALIQUT, APPEARCSF, COLORCSF, CSFWBC, CSFRBC, GLUCCSF, LDHCSF, PROTEINCSF, VDRLCSF in the last 48 hours.  Inflammatory Markers: No results for input(s): SEDRATE, CRP, PROCAL in the last 48 hours.  POCT  Glucose: No results for input(s): POCTGLUCOSE in the last 24 hours.  Prealbumin: No results for input(s): PREALBUMIN in the last 48 hours.  Respiratory Culture: No results for input(s): GSRESP, RESPIRATORYC in the last 48 hours.  Urine Culture:   Recent Labs   Lab 10/19/18  1647   LABURIN PRESUMPTIVE E COLI  >100,000 cfu/ml  Identification and susceptibility pending       Urine Studies:   Recent Labs   Lab 10/19/18  1647   COLORU Yellow   APPEARANCEUA Hazy*   PHUR 5.0   SPECGRAV 1.020   PROTEINUA 1+*   GLUCUA Negative   KETONESU Negative   BILIRUBINUA Negative   OCCULTUA 1+*   NITRITE Negative   UROBILINOGEN Negative   LEUKOCYTESUR 3+*   RBCUA 5*   WBCUA >100*   BACTERIA Moderate*   SQUAMEPITHEL 5   HYALINECASTS 5*     All pertinent lab results from the past 24 hours have been reviewed.    Significant Imaging: I have reviewed all pertinent imaging results/findings within the past 24 hours.

## 2018-10-21 NOTE — ASSESSMENT & PLAN NOTE
87 y.o. female with PMHx PE/DVT, prior R MCA stroke (baseline LSW and sowmya-hypoesthesia), HTN, DM and HLD who is admitted for seizure found with new imaging finding concerning for acute cerebral infarction.     Reviewed imaging with staff Sharmaine; ADC does not clearly correlate with new area of restricted diffusion noted on MRI Brain. In setting of pt with recent episode of seizure, findings may represent artifact caused by pt's recent seizure activity. We suspect if this abnormality is in fact an acute infarct, it likely did not cause her symptoms prompting admission. If ADC does correlate, it is exceptionally faint, suggesting infarct would have likely occurred 7-10 days ago; though pt denies any new or worsening weakness, numbness, vision changes, or language/speech difficulty. I discussed these thoughts/findings with pt and family at bedside.    Either way, this result would not change current management. Please continue current treatment plan as below (see New-onset AFib section.) Stroke team will continue to follow.      Antithrombotics for secondary stroke prevention: Antiplatelets: Clopidogrel: 75 mg daily  Anticoagulants: Apixaban 5 mg BID   Statins for secondary stroke prevention and hyperlipidemia, if present:   Statins: Atorvastatin- 40 mg daily  Aggressive risk factor modification: HTN, DM, HLD, Diet, Exercise, Obesity, A-Fib, CAD, prior stroke  Rehab efforts: PT/OT/SLP to evaluate and treat, PM&R consult   Diagnostics ordered/pending: None   VTE prophylaxis: Eliquis, SCDs  BP parameters: Infarct: Normotensive, per primary

## 2018-10-21 NOTE — ASSESSMENT & PLAN NOTE
Stroke risk factor  [id number]  New this admission; Primary team suspects provoked by infection  Pt on Eliquis currently; Unclear if this was planned for 3-6 month duration in setting of PE, but would either consider further cardiac monitoring on outpatient basis vs long-term AC due to high stroke risk, prior hx clots  Telemetry

## 2018-10-21 NOTE — CARE UPDATE
There are no new recommendations at this time. For other recommendations please see our previous note completed on 10/21/18.    Stroke team will sign off. Discussed patient with staff. Please contact stroke team for any questions or concerns.        Farheen Mckeon PA-C  [facility] - [id number]  Department of Vascular Neurology   [facility]

## 2018-10-21 NOTE — PLAN OF CARE
Problem: SLP Goal  Goal: SLP Goal  Speech Therapy Short Term Goals  Goal expected to be met by 10/29  1. Pt will participate in a bedside swallow study to determine the safest and least restrictive possible po diet with possible updated goals to follow pending results. -MET  Pt will participate in a speech, language, and cognitive evaluation with possible updated goals to follow pending results.-MET    Outcome: Outcome(s) achieved Date Met: 10/21/18  Bedside Swallow Study and Waduly-Qkxirxqr-Yiewqabjr Evaluation completed. Patient is at baseline for speech, language, cognition, and swallowing. Recommend: regular diet, thin liquids, standard aspiration precautions. No further skilled ST services warranted at this time. Please re-consult as needed.   JOHN Echevarria., CCC-SLP  Pager: [phone]  10/21/2018

## 2018-10-21 NOTE — PROGRESS NOTES
[facility]  Neurology  Progress Note    Patient Name: Isa Sewell  MRN: [id number]  Admission Date: 10/19/2018  Hospital Length of Stay: 0 days  Code Status: Full Code   Attending Provider: JOSÉ MIGUEL Oliva MD  Primary Care Physician: Dariel Obregon MD   Principal Problem:Seizure      Subjective:     Interval History: MRI brain showed an infarct in the right corona radiata. Vascular Neurology following.  EEG pending    Current Neurological Medications:     Current Facility-Administered Medications   Medication Dose Route Frequency Provider Last Rate Last Dose    acetaminophen tablet 650 mg  650 mg Oral Q8H PRN Elenita Sweeney MD        amLODIPine tablet 5 mg  5 mg Oral Daily Elenita Sweeney MD   5 mg at 10/21/18 0928    amLODIPine tablet 5 mg  5 mg Oral Daily Tonya Garcisa PA-C        apixaban tablet 5 mg  5 mg Oral BID Elenita Sweeney MD   5 mg at 10/21/18 0928    atorvastatin tablet 40 mg  40 mg Oral Daily Elneita Sweeney MD   40 mg at 10/21/18 0929    cefTRIAXone injection 1 g  1 g Intravenous Q24H Elenita Sweeney MD   1 g at 10/20/18 1651    cetirizine tablet 10 mg  10 mg Oral Daily Elenita Sweeney MD   10 mg at 10/21/18 0929    clopidogrel tablet 75 mg  75 mg Oral Daily Elenita Sweeney MD   75 mg at 10/21/18 0929    dextrose 50% injection 12.5 g  12.5 g Intravenous PRN Elenita Sweeney MD        dextrose 50% injection 25 g  25 g Intravenous PRN Elenita Sweeney MD        glucagon (human recombinant) injection 1 mg  1 mg Intramuscular PRN Elenita Sweeney MD        glucose chewable tablet 16 g  16 g Oral PRN Elenita Sweeney MD        glucose chewable tablet 24 g  24 g Oral PRN Elenita Sweeney MD        levETIRAcetam tablet 500 mg  500 mg Oral BID Vaibhav Mata MD   500 mg at 10/21/18 0929    lisinopril tablet 10 mg  10 mg Oral Daily Tonya Garcias PA-C        [START ON 10/22/2018] lisinopril tablet 10 mg  10 mg Oral Daily Tonya Garcias PA-C         magnesium oxide tablet 400 mg  400 mg Oral Q4H Tonya Garcias PA-C   400 mg at 10/21/18 0929    metoprolol injection 5 mg  5 mg Intravenous Q6H PRN Tonya Garcias PA-C        olmesartan tablet 40 mg  40 mg Oral Daily Elenita Sweeney MD   40 mg at 10/21/18 0930    ondansetron disintegrating tablet 8 mg  8 mg Oral Q8H PRN Elenita Sweeney MD        ramelteon tablet 8 mg  8 mg Oral Nightly PRN Elenita Sweeney MD        senna-docusate 8.6-50 mg per tablet 1 tablet  1 tablet Oral BID PRN Elenita Sweeney MD        sodium chloride 0.9% flush 5 mL  5 mL Intravenous PRN Elenita Sweeney MD           Review of Systems   Constitutional: Negative for fever.   Neurological: Positive for speech difficulty, weakness and numbness. Negative for dizziness and light-headedness.     Objective:     Vital Signs (Most Recent):  Temp: 97.7 °F (36.5 °C) (10/21/18 1214)  Pulse: 72 (10/21/18 1214)  Resp: 20 (10/21/18 1214)  BP: (!) 198/87 (10/21/18 1214)  SpO2: 98 % (10/21/18 1214) Vital Signs (24h Range):  Temp:  [97.5 °F (36.4 °C)-98.8 °F (37.1 °C)] 97.7 °F (36.5 °C)  Pulse:  [55-72] 72  Resp:  [16-20] 20  SpO2:  [92 %-98 %] 98 %  BP: (149-198)/(66-87) 198/87     Weight: 72.6 kg (160 lb)  Body mass index is 27.46 kg/m².    Physical Exam   Constitutional: She is oriented to person, place, and time. No distress.   Eyes: EOM are normal. Pupils are equal, round, and reactive to light.   Neurological: She is alert and oriented to person, place, and time. She has a normal Finger-Nose-Finger Test.   Reflex Scores:       Bicep reflexes are 2+ on the right side and 2+ on the left side.       Brachioradialis reflexes are 2+ on the right side and 2+ on the left side.       Patellar reflexes are 2+ on the right side and 2+ on the left side.       Achilles reflexes are 2+ on the right side and 2+ on the left side.  Psychiatric: Her speech is slurred.       NEUROLOGICAL EXAMINATION:     MENTAL STATUS   Oriented to person, place, and  time.   Oriented to person.   Oriented to place.   Oriented to time.   Speech: slurred   Level of consciousness: alert  Normal comprehension.     CRANIAL NERVES     CN III, IV, VI   Pupils are equal, round, and reactive to light.  Extraocular motions are normal.     CN VII   Facial expression full, symmetric.     CN IX, X   Palate: symmetric    CN XII   CN XII normal.     MOTOR EXAM   Overall muscle tone: normal    Strength   Right deltoid: 5/5  Left deltoid: 4/5  Right biceps: 5/5  Left biceps: 4/5  Right quadriceps: 5/5  Left quadriceps: 4/5  Right peroneal: 5/5  Left peroneal: 4/5  Right gastroc: 5/5  Left gastroc: 4/5    REFLEXES     Reflexes   Right brachioradialis: 2+  Left brachioradialis: 2+  Right biceps: 2+  Left biceps: 2+  Right patellar: 2+  Left patellar: 2+  Right achilles: 2+  Left achilles: 2+    SENSORY EXAM   Left leg light touch: decreased from ankle    GAIT AND COORDINATION      Coordination   Finger to nose coordination: normal      Significant Labs:   Hemoglobin A1c: No results for input(s): HGBA1C in the last 720 hours.  Blood Culture: No results for input(s): LABBLOO in the last 48 hours.  BMP:   Recent Labs   Lab 10/19/18  1641 10/20/18  0351 10/21/18  0354   * 105 108    145 145   K 3.3* 3.9 3.9    113* 112*   CO2 25 23 24   BUN 15 14 17   CREATININE 0.9 0.7 0.8   CALCIUM 9.7 8.9 9.7   MG 1.7 1.7 1.8     CBC:   Recent Labs   Lab 10/19/18  1641 10/20/18  0351 10/21/18  0354   WBC 6.52 5.61 5.78   HGB 12.7 10.8* 11.7*   HCT 40.8 35.0* 37.9    203 212     CMP:   Recent Labs   Lab 10/19/18  1641 10/20/18  0351 10/21/18  0354   * 105 108    145 145   K 3.3* 3.9 3.9    113* 112*   CO2 25 23 24   BUN 15 14 17   CREATININE 0.9 0.7 0.8   CALCIUM 9.7 8.9 9.7   MG 1.7 1.7 1.8   PROT 7.1 5.9* 6.1   ALBUMIN 3.7 3.2* 3.2*   BILITOT 0.5 0.3 0.3   ALKPHOS 102 86 84   AST 18 16 18   ALT 17 13 16   ANIONGAP 11 9 9   EGFRNONAA 57.7* >60.0 >60.0     CSF Culture: No  results for input(s): CSFCULTURE in the last 48 hours.  CSF Studies: No results for input(s): ALIQUT, APPEARCSF, COLORCSF, CSFWBC, CSFRBC, GLUCCSF, LDHCSF, PROTEINCSF, VDRLCSF in the last 48 hours.  Inflammatory Markers: No results for input(s): SEDRATE, CRP, PROCAL in the last 48 hours.  POCT Glucose: No results for input(s): POCTGLUCOSE in the last 24 hours.  Prealbumin: No results for input(s): PREALBUMIN in the last 48 hours.  Respiratory Culture: No results for input(s): GSRESP, RESPIRATORYC in the last 48 hours.  Urine Culture:   Recent Labs   Lab 10/19/18  1647   LABURIN PRESUMPTIVE E COLI  >100,000 cfu/ml  Identification and susceptibility pending       Urine Studies:   Recent Labs   Lab 10/19/18  1647   COLORU Yellow   APPEARANCEUA Hazy*   PHUR 5.0   SPECGRAV 1.020   PROTEINUA 1+*   GLUCUA Negative   KETONESU Negative   BILIRUBINUA Negative   OCCULTUA 1+*   NITRITE Negative   UROBILINOGEN Negative   LEUKOCYTESUR 3+*   RBCUA 5*   WBCUA >100*   BACTERIA Moderate*   SQUAMEPITHEL 5   HYALINECASTS 5*     All pertinent lab results from the past 24 hours have been reviewed.    Significant Imaging: I have reviewed all pertinent imaging results/findings within the past 24 hours.    Assessment and Plan:     * Seizure    87 year old woman with a history of RMCA infarct, had an event consistent with a seizure. This was likely due to her known stroke, and further triggered by UTI. With the stroke she is at increased risk for additional seizures, so we recommend treatment with AEDs.     MRI brain shows a right corona radiata infarct with no ADC correlation (likely T2 shine through)    Recommendations:  -- Continue levetiracetam 500mg BID  -- EEG 1h for baseline  -- F/u Vascular neurology recs.        Acute cystitis without hematuria    -- may have lowered seizure threshold in the setting of potential epileptic focus (infarct)  -- management per primary     Hx of ischemic right MCA stroke    -- risk for seizures  --  residual left sided weakness  -- on plavix and atorvastatin for secondary prevention     Chronic saddle pulmonary embolism without acute cor pulmonale    -- on apixaban     Chronic deep vein thrombosis (DVT) of femoral vein of left lower extremity    -- on apixaban     Left-sided weakness    -- due to RMCA stroke         VTE Risk Mitigation (From admission, onward)        Ordered     apixaban tablet 5 mg  2 times daily      10/19/18 1938        Will sign off. Please call with any questions.    Nabor Rhodes MD  Neurology  [facility]

## 2018-10-21 NOTE — ASSESSMENT & PLAN NOTE
L sided weakness  Hyperlipidemia  - Sept 2018  - Continue Eliquis 5mg PO BID  - Continue Plavix 75mg PO daily  - Continue Lipitor 40mg PO daily  10/21: PT recommending SNF

## 2018-10-21 NOTE — SUBJECTIVE & OBJECTIVE
Neurologic Chief Complaint: Incidental finding on imaging with altered mental status.    Subjective:     Interval History: Patient is seen for follow-up neurological assessment and treatment recommendations: Pt presented with AMS attributed to UTI and found to have attenuation next to area of old infarct. Also found to have AFib with CHADSVASc of 7.    HPI, Past Medical, Family, and Social History remains the same as documented in the initial encounter.     Review of Systems   Constitutional: Negative for fever and unexpected weight change.   HENT: Negative for ear pain, sinus pressure, sneezing and sore throat.    Eyes: Negative for pain and visual disturbance.   Respiratory: Negative for cough, chest tightness, shortness of breath and wheezing.    Cardiovascular: Negative for chest pain, palpitations and leg swelling.   Gastrointestinal: Negative for abdominal pain, constipation, diarrhea, nausea and vomiting.   Endocrine: Negative for polydipsia and polyuria.   Genitourinary: Negative for decreased urine volume, difficulty urinating, dysuria and urgency.   Musculoskeletal: Negative for arthralgias and myalgias.   Skin: Negative for color change and rash.   Allergic/Immunologic: Negative for environmental allergies and food allergies.   Neurological: Negative for dizziness, syncope, weakness, light-headedness and headaches.   Psychiatric/Behavioral: Negative for confusion and dysphoric mood. The patient is not nervous/anxious.      Scheduled Meds:   amLODIPine  5 mg Oral Daily    amLODIPine  5 mg Oral Daily    apixaban  5 mg Oral BID    atorvastatin  40 mg Oral Daily    cefTRIAXone (ROCEPHIN) IVPB  1 g Intravenous Q24H    cetirizine  10 mg Oral Daily    clopidogrel  75 mg Oral Daily    levETIRAcetam  500 mg Oral BID    lisinopril  10 mg Oral Daily    magnesium oxide  400 mg Oral Q4H    olmesartan  40 mg Oral Daily     Continuous Infusions:  PRN Meds:acetaminophen, dextrose 50%, dextrose 50%, glucagon  (human recombinant), glucose, glucose, ondansetron, ramelteon, senna-docusate 8.6-50 mg, sodium chloride 0.9%    Objective:     Vital Signs (Most Recent):  Temp: 97.7 °F (36.5 °C) (10/21/18 1214)  Pulse: 72 (10/21/18 1214)  Resp: 20 (10/21/18 1214)  BP: (!) 198/87 (10/21/18 1214)  SpO2: 98 % (10/21/18 1214)  BP Location: Left arm    Vital Signs Range (Last 24H):  Temp:  [97.5 °F (36.4 °C)-98.8 °F (37.1 °C)]   Pulse:  [55-72]   Resp:  [16-20]   BP: (149-198)/(66-87)   SpO2:  [92 %-98 %]   BP Location: Left arm    Physical Exam   Constitutional: She is oriented to person, place, and time. She appears well-developed and well-nourished.   HENT:   Head: Normocephalic and atraumatic.   Eyes: EOM are normal. Pupils are equal, round, and reactive to light. No scleral icterus.   Neck: Normal range of motion. Neck supple.   Cardiovascular: Normal rate, regular rhythm, normal heart sounds and intact distal pulses.   No murmur heard.  Pulmonary/Chest: Effort normal and breath sounds normal. No respiratory distress. She has no wheezes.   Abdominal: Soft. Bowel sounds are normal. She exhibits no distension. There is no tenderness.   Neurological: She is alert and oriented to person, place, and time.   Skin: Skin is warm and dry.   Psychiatric: She has a normal mood and affect. Her behavior is normal.   Vitals reviewed.      Neurological Exam:   LOC: alert  Attention Span: Good   Language: No aphasia  Orientation: Person, Place, Time   Visual Fields: Full  EOM (CN III, IV, VI): Full/intact  Pupils (CN II, III): PERRL  Facial Movement (CN VII): Symmetric facial expression    Motor: Arm left  Normal 5/5  Leg left  Normal 5/5  Arm right  Normal 5/5  Leg right Normal 5/5  Tone: Normal tone throughout    Laboratory:  CMP:   Recent Labs   Lab 10/21/18  0354   CALCIUM 9.7   ALBUMIN 3.2*   PROT 6.1      K 3.9   CO2 24   *   BUN 17   CREATININE 0.8   ALKPHOS 84   ALT 16   AST 18   BILITOT 0.3     CBC:   Recent Labs   Lab  10/21/18  0354   WBC 5.78   RBC 4.19   HGB 11.7*   HCT 37.9      MCV 91   MCH 27.9   MCHC 30.9*     Lipid Panel: No results for input(s): CHOL, LDLCALC, HDL, TRIG in the last 168 hours.    Diagnostic Results     Brain Imaging   MRI 10/20   Small acute lacunar type infarction in the right corona radiata, adjacent to the old infarct. Likely artifact.     CTH 10/19   1. No acute intracranial abnormality.  2. Right MCA territory encephalomalacia consistent with prior infarct.  Remote lacunar infarcts within the right basal ganglia.      Vessel Imaging   Nil    Cardiac Imaging   2D ECHO with bubble study pending.

## 2018-10-21 NOTE — SUBJECTIVE & OBJECTIVE
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/2016    Seizure 10/19/2018     Past Surgical History:   Procedure Laterality Date    BREAST BIOPSY Bilateral     BOTH BENIGN    CHOLECYSTECTOMY      FRACTURE SURGERY Right     ankle    HYSTERECTOMY      TONSILLECTOMY       Family History   Problem Relation Age of Onset    Melanoma Neg Hx     Psoriasis Neg Hx     Lupus Neg Hx     Eczema Neg Hx      Social History     Tobacco Use    Smoking status: Never Smoker    Smokeless tobacco: Never Used   Substance Use Topics    Alcohol use: No    Drug use: No     Review of patient's allergies indicates:   Allergen Reactions    Sulfa (sulfonamide antibiotics) Hives       Medications: I have reviewed the current medication administration record.    Medications Prior to Admission   Medication Sig Dispense Refill Last Dose    amlodipine (NORVASC) 5 MG tablet Take 5 mg by mouth once daily.   10/19/2018    apixaban (ELIQUIS) 5 mg (74 tabs) DsPk For the first 7 days take two 5 mg tablets twice daily.  After 7 days take one 5 mg tablet twice daily. 74 tablet 0 10/19/2018    atorvastatin (LIPITOR) 40 MG tablet Take 1 tablet (40 mg total) by mouth once daily. 90 tablet 3 10/19/2018    cetirizine (ZYRTEC) 10 MG tablet Take 10 mg by mouth once daily.   10/19/2018    clobetasol 0.05% (TEMOVATE) 0.05 % Oint APPLY TO AFFECTED AREA TWICE DAILY. AVOID FACE, ARMPITS, AND GROIN. 60 g 2 Taking    clopidogrel (PLAVIX) 75 mg tablet Take 1 tablet (75 mg total) by mouth once daily. 30 tablet 11 10/19/2018    melatonin 3 mg Tab Take 1 tablet (3 mg total) by mouth every evening. 90 tablet 3 10/18/2018    olmesartan (BENICAR) 40 MG tablet Take 1 tablet (40 mg total) by mouth once daily. 90 tablet 3 10/19/2018     phenazopyridine (PYRIDIUM) 200 MG tablet Take 200 mg by mouth 3 (three) times daily as needed for Pain.   Taking    triamcinolone acetonide 0.1% (KENALOG) 0.1 % ointment   3        Review of Systems   Constitutional: Negative for chills and fever.   HENT: Negative for nosebleeds and trouble swallowing.    Eyes: Negative for discharge and visual disturbance.   Respiratory: Negative for choking and stridor.    Cardiovascular: Positive for leg swelling. Negative for chest pain.   Gastrointestinal: Negative for diarrhea and vomiting.   Musculoskeletal: Negative for gait problem and neck stiffness.   Skin: Negative for pallor and rash.   Neurological: Positive for seizures, facial asymmetry, speech difficulty, weakness and numbness.   Psychiatric/Behavioral: Negative for agitation, behavioral problems and confusion.     Objective:     Vital Signs (Most Recent):  Temp: 98.8 °F (37.1 °C) (10/20/18 1726)  Pulse: (!) 59 (10/20/18 1920)  Resp: 16 (10/20/18 1726)  BP: (!) 151/69 (10/20/18 1726)  SpO2: (!) 93 % (10/20/18 1726)    Vital Signs Range (Last 24H):  Temp:  [97.2 °F (36.2 °C)-98.8 °F (37.1 °C)]   Pulse:  [55-70]   Resp:  [15-24]   BP: (102-168)/(45-80)   SpO2:  [92 %-97 %]     Physical Exam   Constitutional: She is oriented to person, place, and time. She appears well-developed and well-nourished. No distress.   HENT:   Head: Normocephalic and atraumatic.   Eyes: Conjunctivae and EOM are normal.   Cardiovascular: Normal rate.   Pulmonary/Chest: Effort normal. No respiratory distress.   Musculoskeletal: She exhibits no tenderness or deformity.   Neurological: She is alert and oriented to person, place, and time. A cranial nerve deficit and sensory deficit is present. She exhibits normal muscle tone. Coordination normal.   Skin: Skin is warm and dry.   Psychiatric: She has a normal mood and affect. Her behavior is normal.       Neurological Exam:   LOC: alert  Attention Span: Good   Language: No aphasia  Articulation:  Mild dysarthria (baseline)  Orientation: Person, Place, Time   Visual Fields: Full  EOM (CN III, IV, VI): Full/intact  Facial Movement (CN VII): Lower facial weakness on the Left  Motor: Arm left  Paresis: 4/5  Leg left  Paresis: 4/5  Arm right  Normal 5/5  Leg right Normal 5/5  Cebellar: No evidence of appendicular or axial ataxia  Sensation: Fawad-hypoesthesia left  Tone: Normal tone throughout      Laboratory:  CMP:   Recent Labs   Lab 10/20/18  0351   CALCIUM 8.9   ALBUMIN 3.2*   PROT 5.9*      K 3.9   CO2 23   *   BUN 14   CREATININE 0.7   ALKPHOS 86   ALT 13   AST 16   BILITOT 0.3     CBC:   Recent Labs   Lab 10/20/18  0351   WBC 5.61   RBC 3.88*   HGB 10.8*   HCT 35.0*      MCV 90   MCH 27.8   MCHC 30.9*     Lipid Panel: No results for input(s): CHOL, LDLCALC, HDL, TRIG in the last 168 hours.  Coagulation:   Recent Labs   Lab 10/19/18  1641   INR 0.9     Hgb A1C: No results for input(s): HGBA1C in the last 168 hours.  TSH: No results for input(s): TSH in the last 168 hours.    Diagnostic Results:      Brain imaging:    MRI Brain 10/20/18    Small acute lacunar type infarction in the right corona radiata, adjacent to the old right MCA vascular territory infarction.  No significant mass effect or acute intracranial hemorrhage.    CT Head 10/19/18  1. No acute intracranial abnormality.  2. Right MCA territory encephalomalacia consistent with prior infarct.  Remote lacunar infarcts within the right basal ganglia.  3. Senescent changes.

## 2018-10-21 NOTE — ASSESSMENT & PLAN NOTE
" MRI brain shows "acute lacunar type infarction in the right corona radiata, adjacent to the old right MCA vascular territory infarction.  No significant mass effect or acute intracranial hemorrhage"    - Per [facility] Neuro, imaging suggestive of small infarct," likely not cause of symptoms prompting admission"; continue current home regimen (plavix, eliquis, statin), recs appreciated  - 2DCFD with bubble pending  - SLP evaluation, no further management recommendations at this time  - PMR recommendations pending  - PT recommending SNF  "

## 2018-10-21 NOTE — ASSESSMENT & PLAN NOTE
-- may have lowered seizure threshold in the setting of potential epileptic focus (infarct)  -- management per primary

## 2018-10-21 NOTE — CONSULTS
Inpatient consult to Physical Medicine Rehab  Consult performed by: LORENZA Sandoval  Consult ordered by: Tonya Garcias PA-C  Reason for consult: assess rehab needs      Consult received.  Reviewed patient history and current admission.  Rehab team following.  Full consult to follow.    JOSEPHINE Hazel, LORENZA-C  Physical Medicine & Rehabilitation   10/21/2018  Spectralink: [id number]

## 2018-10-21 NOTE — PROGRESS NOTES
[facility]  Vascular Neurology  Comprehensive Stroke Center  Progress Note    Assessment/Plan:     * Seizure    Reason for admission  Per primary, Gen Neuro following     Thrombotic stroke involving right middle cerebral artery    87 y.o. female with PMHx PE/DVT, prior R MCA stroke (baseline LSW and sowmya-hypoesthesia), HTN, DM and HLD who is admitted for seizure found with new imaging finding concerning for acute cerebral infarction.     Reviewed imaging with staff Sharmaine; ADC does not clearly correlate with new area of restricted diffusion noted on MRI Brain. In setting of pt with recent episode of seizure, findings may represent artifact caused by pt's recent seizure activity. We suspect if this abnormality is in fact an acute infarct, it likely did not cause her symptoms prompting admission. If ADC does correlate, it is exceptionally faint, suggesting infarct would have likely occurred 7-10 days ago; though pt denies any new or worsening weakness, numbness, vision changes, or language/speech difficulty. I discussed these thoughts/findings with pt and family at bedside.    Either way, this result would not change current management. Please continue current treatment plan as below (see New-onset AFib section.) Stroke team will continue to follow.      Antithrombotics for secondary stroke prevention: Antiplatelets: Clopidogrel: 75 mg daily  Anticoagulants: Apixaban 5 mg BID   Statins for secondary stroke prevention and hyperlipidemia, if present:   Statins: Atorvastatin- 40 mg daily  Aggressive risk factor modification: HTN, DM, HLD, Diet, Exercise, Obesity, A-Fib, CAD, prior stroke  Rehab efforts: PT/OT/SLP to evaluate and treat, PM&R consult   Diagnostics ordered/pending: None   VTE prophylaxis: Eliquis, SCDs  BP parameters: Infarct: Normotensive, per primary     New onset a-fib    Stroke risk factor  [id number] 7  New this admission; Primary team suspects provoked by infection  Pt on Eliquis  currently; Unclear if this was planned for 3-6 month duration in setting of PE, but would either consider further cardiac monitoring on outpatient basis vs long-term AC due to high stroke risk, prior hx clots  Telemetry     Hx of ischemic right MCA stroke    Stroke risk factor  Pt with LSW, sowmya-hypoesthesias at baseline; Pt reports no recent worsening  Eliquis, Plavix, statin     Chronic saddle pulmonary embolism without acute cor pulmonale    Stroke risk factor  Sept 2018  Continue Eliquis 5mg PO BID  Per primary     Left-sided weakness    2/2 prior stroke; Pt reports no worsening  Consider PT, OT eval and treat     Hyperlipidemia    Stroke risk factor    Continue home Atorvastatin     Benign essential HTN    Stroke risk factor  SBP Normotensive  Per primary          No notes on file    STROKE DOCUMENTATION        NIH Scale:  1a. Level Of Consciousness: 0-->Alert: keenly responsive  1b. LOC Questions: 0-->Answers both questions correctly  1c. LOC Commands: 0-->Performs both tasks correctly  2. Best Gaze: 0-->Normal  3. Visual: 0-->No visual loss  4. Facial Palsy: 0-->Normal symmetrical movements  5a. Motor Arm, Left: 0-->No drift: limb holds 90 (or 45) degrees for full 10 secs  5b. Motor Arm, Right: 0-->No drift: limb holds 90 (or 45) degrees for full 10 secs  6a. Motor Leg, Left: 0-->No drift: leg holds 30 degree position for full 5 secs  6b. Motor Leg, Right: 0-->No drift: leg holds 30 degree position for full 5 secs  7. Limb Ataxia: 0-->Absent  8. Sensory: 0-->Normal: no sensory loss  9. Best Language: 0-->No aphasia: normal  10. Dysarthria: 0-->Normal  11. Extinction and Inattention (formerly Neglect): 0-->No abnormality  Total (NIH Stroke Scale): 0       Modified Beauregard Score: 2  Denzel Coma Scale:    ABCD2 Score:    [id number] Score:   HAS -BLED Score:   ICH Score:   Hunt & Morales Classification:      Hemorrhagic change of an Ischemic Stroke: Does this patient have an ischemic stroke with hemorrhagic  changes? No     Neurologic Chief Complaint: Incidental finding on imaging with altered mental status.    Subjective:     Interval History: Patient is seen for follow-up neurological assessment and treatment recommendations: Pt presented with AMS attributed to UTI and found to have attenuation next to area of old infarct. Also found to have AFib with CHADSVASc of 7.    HPI, Past Medical, Family, and Social History remains the same as documented in the initial encounter.     Review of Systems   Constitutional: Negative for fever and unexpected weight change.   HENT: Negative for ear pain, sinus pressure, sneezing and sore throat.    Eyes: Negative for pain and visual disturbance.   Respiratory: Negative for cough, chest tightness, shortness of breath and wheezing.    Cardiovascular: Negative for chest pain, palpitations and leg swelling.   Gastrointestinal: Negative for abdominal pain, constipation, diarrhea, nausea and vomiting.   Endocrine: Negative for polydipsia and polyuria.   Genitourinary: Negative for decreased urine volume, difficulty urinating, dysuria and urgency.   Musculoskeletal: Negative for arthralgias and myalgias.   Skin: Negative for color change and rash.   Allergic/Immunologic: Negative for environmental allergies and food allergies.   Neurological: Negative for dizziness, syncope, weakness, light-headedness and headaches.   Psychiatric/Behavioral: Negative for confusion and dysphoric mood. The patient is not nervous/anxious.      Scheduled Meds:   amLODIPine  5 mg Oral Daily    amLODIPine  5 mg Oral Daily    apixaban  5 mg Oral BID    atorvastatin  40 mg Oral Daily    cefTRIAXone (ROCEPHIN) IVPB  1 g Intravenous Q24H    cetirizine  10 mg Oral Daily    clopidogrel  75 mg Oral Daily    levETIRAcetam  500 mg Oral BID    lisinopril  10 mg Oral Daily    magnesium oxide  400 mg Oral Q4H    olmesartan  40 mg Oral Daily     Continuous Infusions:  PRN Meds:acetaminophen, dextrose 50%, dextrose  50%, glucagon (human recombinant), glucose, glucose, ondansetron, ramelteon, senna-docusate 8.6-50 mg, sodium chloride 0.9%    Objective:     Vital Signs (Most Recent):  Temp: 97.7 °F (36.5 °C) (10/21/18 1214)  Pulse: 72 (10/21/18 1214)  Resp: 20 (10/21/18 1214)  BP: (!) 198/87 (10/21/18 1214)  SpO2: 98 % (10/21/18 1214)  BP Location: Left arm    Vital Signs Range (Last 24H):  Temp:  [97.5 °F (36.4 °C)-98.8 °F (37.1 °C)]   Pulse:  [55-72]   Resp:  [16-20]   BP: (149-198)/(66-87)   SpO2:  [92 %-98 %]   BP Location: Left arm    Physical Exam   Constitutional: She is oriented to person, place, and time. She appears well-developed and well-nourished.   HENT:   Head: Normocephalic and atraumatic.   Eyes: EOM are normal. Pupils are equal, round, and reactive to light. No scleral icterus.   Neck: Normal range of motion. Neck supple.   Cardiovascular: Normal rate, regular rhythm, normal heart sounds and intact distal pulses.   No murmur heard.  Pulmonary/Chest: Effort normal and breath sounds normal. No respiratory distress. She has no wheezes.   Abdominal: Soft. Bowel sounds are normal. She exhibits no distension. There is no tenderness.   Neurological: She is alert and oriented to person, place, and time.   Skin: Skin is warm and dry.   Psychiatric: She has a normal mood and affect. Her behavior is normal.   Vitals reviewed.      Neurological Exam:   LOC: alert  Attention Span: Good   Language: No aphasia  Orientation: Person, Place, Time   Visual Fields: Full  EOM (CN III, IV, VI): Full/intact  Pupils (CN II, III): PERRL  Facial Movement (CN VII): Symmetric facial expression    Motor: Arm left  Normal 5/5  Leg left  Normal 5/5  Arm right  Normal 5/5  Leg right Normal 5/5  Tone: Normal tone throughout    Laboratory:  CMP:   Recent Labs   Lab 10/21/18  0354   CALCIUM 9.7   ALBUMIN 3.2*   PROT 6.1      K 3.9   CO2 24   *   BUN 17   CREATININE 0.8   ALKPHOS 84   ALT 16   AST 18   BILITOT 0.3     CBC:   Recent  Labs   Lab 10/21/18  0354   WBC 5.78   RBC 4.19   HGB 11.7*   HCT 37.9      MCV 91   MCH 27.9   MCHC 30.9*     Lipid Panel: No results for input(s): CHOL, LDLCALC, HDL, TRIG in the last 168 hours.    Diagnostic Results     Brain Imaging   MRI 10/20   Small acute lacunar type infarction in the right corona radiata, adjacent to the old infarct. Likely artifact.     CTH 10/19   1. No acute intracranial abnormality.  2. Right MCA territory encephalomalacia consistent with prior infarct.  Remote lacunar infarcts within the right basal ganglia.      Vessel Imaging   Nil    Cardiac Imaging   2D ECHO with bubble study pending.      Behram Khan, MD  Comprehensive Stroke Center  Department of Vascular Neurology   [facility]

## 2018-10-21 NOTE — ASSESSMENT & PLAN NOTE
- Likely infection induced  - [id number] score: 6  - Continue tele  - Continue Eliquis 5mg PO BID  - Will hold on repeat echo at this time given normal BNP  10/20: NSR on tele; will not pursue further work up at this time  10/21: 2DCFD with bubble contract pending (ordered in setting of acute cerebral infarct)

## 2018-10-21 NOTE — ASSESSMENT & PLAN NOTE
"- Unclear at this time if new onset seizure, or if provoked by UTI  - Will give Keppra 1g IV x 1  - Seizures precautions  - Epilepsy consulted, appreciate assistance  10/20:   - MRI brain shows "acute lacunar type infarction in the right corona radiata, adjacent to the old right MCA vascular territory infarction.  No significant mass effect or acute intracranial hemorrhage"  - [facility] Neuro consulted (see below)  - Continue levetiracetam 500mg BID per neuro recs, appreciated  10/21: EEG pending  "

## 2018-10-21 NOTE — ASSESSMENT & PLAN NOTE
87 year old woman with a history of RMCA infarct, had an event consistent with a seizure. This was likely due to her known stroke, and further triggered by UTI. With the stroke she is at increased risk for additional seizures, so we recommend treatment with AEDs.     MRI brain shows a right corona radiata infarct with no ADC correlation (likely T2 shine through)    Recommendations:  -- Continue levetiracetam 500mg BID  -- EEG 1h for baseline  -- F/u Vascular neurology recs.

## 2018-10-21 NOTE — ASSESSMENT & PLAN NOTE
- Chronic and stable  - Continue Norvasc 5mg PO daily  - Continue Olmesartan 40mg PO daily  10/20: stable  10/21: Hypertensive 180-190s/70-80s; start Lisinopril 10mg PO daily  PRN Metoprolol 5mg IV for sustained SBP >170

## 2018-10-21 NOTE — SUBJECTIVE & OBJECTIVE
Interval History: MRI brain yesterday significant for new small cerebral infarct adjacent to previous in R MCA. Continue current plan of care, discussed patient and family.    Review of Systems   Constitutional: Negative for chills, fatigue and fever.   HENT: Negative for sore throat and trouble swallowing.    Eyes: Negative for discharge and redness.   Respiratory: Negative for cough and shortness of breath.    Genitourinary: Negative for dysuria, flank pain and frequency.   Musculoskeletal: Negative for arthralgias and myalgias.   Neurological: Positive for weakness (residual L sided, at baseline per pt). Negative for dizziness, syncope and light-headedness.   Psychiatric/Behavioral: Negative for agitation and decreased concentration.     Objective:     Vital Signs (Most Recent):  Temp: 97.7 °F (36.5 °C) (10/21/18 1214)  Pulse: 72 (10/21/18 1214)  Resp: 20 (10/21/18 1214)  BP: (!) 198/87 (10/21/18 1214)  SpO2: 98 % (10/21/18 1214) Vital Signs (24h Range):  Temp:  [97.2 °F (36.2 °C)-98.8 °F (37.1 °C)] 97.7 °F (36.5 °C)  Pulse:  [55-72] 72  Resp:  [16-20] 20  SpO2:  [92 %-98 %] 98 %  BP: (149-198)/(66-87) 198/87     Weight: 72.6 kg (160 lb)  Body mass index is 27.46 kg/m².    Intake/Output Summary (Last 24 hours) at 10/21/2018 1250  Last data filed at 10/20/2018 1757  Gross per 24 hour   Intake 960 ml   Output --   Net 960 ml      Physical Exam   Constitutional: She is oriented to person, place, and time. No distress.   HENT:   Head: Normocephalic and atraumatic.   Eyes: EOM are normal. Right eye exhibits no discharge. Left eye exhibits no discharge.   Cardiovascular: Normal rate and regular rhythm.   Pulmonary/Chest: Effort normal. No respiratory distress.   Abdominal: Soft. Bowel sounds are normal. She exhibits no distension. There is no tenderness.   Musculoskeletal: She exhibits no edema or tenderness.   Neurological: She is alert and oriented to person, place, and time. A sensory deficit (residual from previous  CVA, at baseline) is present.   Skin: Skin is warm and dry. She is not diaphoretic.   Psychiatric: She has a normal mood and affect. Thought content normal.       Significant Labs: All pertinent labs within the past 24 hours have been reviewed.    Significant Imaging: I have reviewed all pertinent imaging results/findings within the past 24 hours.

## 2018-10-22 ENCOUNTER — TELEPHONE (OUTPATIENT)
Dept: NEUROLOGY | Facility: CLINIC | Age: 83
End: 2018-10-22

## 2018-10-22 ENCOUNTER — DOCUMENTATION ONLY (OUTPATIENT)
Dept: CARDIOLOGY | Facility: CLINIC | Age: 83
End: 2018-10-22

## 2018-10-22 VITALS
OXYGEN SATURATION: 92 % | HEIGHT: 62 IN | RESPIRATION RATE: 17 BRPM | BODY MASS INDEX: 29.44 KG/M2 | HEART RATE: 55 BPM | DIASTOLIC BLOOD PRESSURE: 61 MMHG | SYSTOLIC BLOOD PRESSURE: 137 MMHG | WEIGHT: 160 LBS | TEMPERATURE: 97 F

## 2018-10-22 LAB
ALBUMIN SERPL BCP-MCNC: 3.1 G/DL
ALP SERPL-CCNC: 84 U/L
ALT SERPL W/O P-5'-P-CCNC: 14 U/L
ANION GAP SERPL CALC-SCNC: 11 MMOL/L
AST SERPL-CCNC: 18 U/L
BASOPHILS # BLD AUTO: 0.05 K/UL
BASOPHILS NFR BLD: 0.8 %
BILIRUB SERPL-MCNC: 0.3 MG/DL
BUN SERPL-MCNC: 17 MG/DL
CALCIUM SERPL-MCNC: 9.5 MG/DL
CHLORIDE SERPL-SCNC: 110 MMOL/L
CO2 SERPL-SCNC: 22 MMOL/L
CREAT SERPL-MCNC: 0.7 MG/DL
DIASTOLIC DYSFUNCTION: NO
DIFFERENTIAL METHOD: ABNORMAL
EOSINOPHIL # BLD AUTO: 0.3 K/UL
EOSINOPHIL NFR BLD: 5.4 %
ERYTHROCYTE [DISTWIDTH] IN BLOOD BY AUTOMATED COUNT: 14.5 %
EST. GFR  (AFRICAN AMERICAN): >60 ML/MIN/1.73 M^2
EST. GFR  (NON AFRICAN AMERICAN): >60 ML/MIN/1.73 M^2
ESTIMATED PA SYSTOLIC PRESSURE: 22.71
GLUCOSE SERPL-MCNC: 111 MG/DL
HCT VFR BLD AUTO: 37.4 %
HGB BLD-MCNC: 11.6 G/DL
IMM GRANULOCYTES # BLD AUTO: 0.03 K/UL
IMM GRANULOCYTES NFR BLD AUTO: 0.5 %
LYMPHOCYTES # BLD AUTO: 1.3 K/UL
LYMPHOCYTES NFR BLD: 21 %
MAGNESIUM SERPL-MCNC: 1.8 MG/DL
MCH RBC QN AUTO: 27.8 PG
MCHC RBC AUTO-ENTMCNC: 31 G/DL
MCV RBC AUTO: 90 FL
MITRAL VALVE MOBILITY: NORMAL
MONOCYTES # BLD AUTO: 0.6 K/UL
MONOCYTES NFR BLD: 9.2 %
NEUTROPHILS # BLD AUTO: 4 K/UL
NEUTROPHILS NFR BLD: 63.1 %
NRBC BLD-RTO: 0 /100 WBC
PHOSPHATE SERPL-MCNC: 4.2 MG/DL
PLATELET # BLD AUTO: 217 K/UL
PMV BLD AUTO: 10.3 FL
POTASSIUM SERPL-SCNC: 4.1 MMOL/L
PROT SERPL-MCNC: 6 G/DL
RBC # BLD AUTO: 4.17 M/UL
RETIRED EF AND QEF - SEE NOTES: 60 (ref 55–65)
SODIUM SERPL-SCNC: 143 MMOL/L
WBC # BLD AUTO: 6.3 K/UL

## 2018-10-22 PROCEDURE — 99217 PR OBSERVATION CARE DISCHARGE: CPT | Mod: ,,, | Performed by: INTERNAL MEDICINE

## 2018-10-22 PROCEDURE — G0378 HOSPITAL OBSERVATION PER HR: HCPCS

## 2018-10-22 PROCEDURE — 97116 GAIT TRAINING THERAPY: CPT

## 2018-10-22 PROCEDURE — 25000003 PHARM REV CODE 250: Performed by: PSYCHIATRY & NEUROLOGY

## 2018-10-22 PROCEDURE — 25000003 PHARM REV CODE 250: Performed by: HOSPITALIST

## 2018-10-22 PROCEDURE — G8979 MOBILITY GOAL STATUS: HCPCS | Mod: CJ

## 2018-10-22 PROCEDURE — 84100 ASSAY OF PHOSPHORUS: CPT

## 2018-10-22 PROCEDURE — 25000003 PHARM REV CODE 250: Performed by: PHYSICIAN ASSISTANT

## 2018-10-22 PROCEDURE — 93306 TTE W/DOPPLER COMPLETE: CPT | Mod: 26,,, | Performed by: INTERNAL MEDICINE

## 2018-10-22 PROCEDURE — G8980 MOBILITY D/C STATUS: HCPCS | Mod: CJ

## 2018-10-22 PROCEDURE — 83735 ASSAY OF MAGNESIUM: CPT

## 2018-10-22 PROCEDURE — 97165 OT EVAL LOW COMPLEX 30 MIN: CPT

## 2018-10-22 PROCEDURE — 96374 THER/PROPH/DIAG INJ IV PUSH: CPT

## 2018-10-22 PROCEDURE — 99222 1ST HOSP IP/OBS MODERATE 55: CPT | Mod: ,,, | Performed by: NURSE PRACTITIONER

## 2018-10-22 PROCEDURE — 80053 COMPREHEN METABOLIC PANEL: CPT

## 2018-10-22 PROCEDURE — 85025 COMPLETE CBC W/AUTO DIFF WBC: CPT

## 2018-10-22 PROCEDURE — 36415 COLL VENOUS BLD VENIPUNCTURE: CPT

## 2018-10-22 RX ORDER — LEVETIRACETAM 500 MG/1
500 TABLET ORAL 2 TIMES DAILY
Qty: 180 TABLET | Refills: 3 | Status: SHIPPED | OUTPATIENT
Start: 2018-10-22 | End: 2019-10-24 | Stop reason: SDUPTHER

## 2018-10-22 RX ORDER — LANOLIN ALCOHOL/MO/W.PET/CERES
400 CREAM (GRAM) TOPICAL 2 TIMES DAILY
Status: DISCONTINUED | OUTPATIENT
Start: 2018-10-22 | End: 2018-10-22 | Stop reason: HOSPADM

## 2018-10-22 RX ORDER — AMOXICILLIN AND CLAVULANATE POTASSIUM 500; 125 MG/1; MG/1
1 TABLET, FILM COATED ORAL 2 TIMES DAILY
Qty: 8 TABLET | Refills: 0 | Status: SHIPPED | OUTPATIENT
Start: 2018-10-22 | End: 2019-10-15

## 2018-10-22 RX ORDER — CARVEDILOL 6.25 MG/1
6.25 TABLET ORAL 2 TIMES DAILY
Qty: 180 TABLET | Refills: 3 | Status: SHIPPED | OUTPATIENT
Start: 2018-10-22 | End: 2019-09-16 | Stop reason: SDUPTHER

## 2018-10-22 RX ADMIN — CARVEDILOL 6.25 MG: 6.25 TABLET, FILM COATED ORAL at 08:10

## 2018-10-22 RX ADMIN — APIXABAN 5 MG: 5 TABLET, FILM COATED ORAL at 08:10

## 2018-10-22 RX ADMIN — ATORVASTATIN CALCIUM 40 MG: 20 TABLET, FILM COATED ORAL at 08:10

## 2018-10-22 RX ADMIN — CETIRIZINE HYDROCHLORIDE 10 MG: 10 TABLET, FILM COATED ORAL at 08:10

## 2018-10-22 RX ADMIN — OLMESARTAN MEDOXOMIL 40 MG: 20 TABLET, FILM COATED ORAL at 08:10

## 2018-10-22 RX ADMIN — CLOPIDOGREL 75 MG: 75 TABLET, FILM COATED ORAL at 08:10

## 2018-10-22 RX ADMIN — AMLODIPINE BESYLATE 5 MG: 5 TABLET ORAL at 08:10

## 2018-10-22 RX ADMIN — MAGNESIUM OXIDE TAB 400 MG (241.3 MG ELEMENTAL MG) 400 MG: 400 (241.3 MG) TAB at 08:10

## 2018-10-22 RX ADMIN — LEVETIRACETAM 500 MG: 500 TABLET ORAL at 08:10

## 2018-10-22 NOTE — SUBJECTIVE & OBJECTIVE
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/2016    Seizure 10/19/2018     Past Surgical History:   Procedure Laterality Date    BREAST BIOPSY Bilateral     BOTH BENIGN    CHOLECYSTECTOMY      FRACTURE SURGERY Right     ankle    HYSTERECTOMY      TONSILLECTOMY       Review of patient's allergies indicates:   Allergen Reactions    Sulfa (sulfonamide antibiotics) Hives       Scheduled Medications:    amLODIPine  5 mg Oral Daily    apixaban  5 mg Oral BID    atorvastatin  40 mg Oral Daily    carvedilol  6.25 mg Oral BID    cefTRIAXone (ROCEPHIN) IVPB  1 g Intravenous Q24H    cetirizine  10 mg Oral Daily    clopidogrel  75 mg Oral Daily    levETIRAcetam  500 mg Oral BID    magnesium oxide  400 mg Oral BID    olmesartan  40 mg Oral Daily       PRN Medications: acetaminophen, dextrose 50%, dextrose 50%, glucagon (human recombinant), glucose, glucose, metoprolol, ondansetron, pneumoc 13-neela conj-dip cr(PF), ramelteon, senna-docusate 8.6-50 mg, sodium chloride 0.9%    Family History     None        Tobacco Use    Smoking status: Never Smoker    Smokeless tobacco: Never Used   Substance and Sexual Activity    Alcohol use: No    Drug use: No    Sexual activity: Not on file     Review of Systems   Constitutional: Negative for chills, fatigue and fever.   HENT: Negative for trouble swallowing and voice change.    Eyes: Negative for photophobia and visual disturbance.   Respiratory: Negative for cough, shortness of breath and wheezing.    Cardiovascular: Negative for chest pain and palpitations.   Gastrointestinal: Negative for abdominal distention, nausea and vomiting.   Genitourinary: Negative for difficulty urinating and flank pain.   Musculoskeletal: Positive for gait problem. Negative for  arthralgias.   Skin: Negative for color change and rash.   Neurological: Positive for weakness. Negative for facial asymmetry, speech difficulty, numbness and headaches.   Psychiatric/Behavioral: Negative for agitation and confusion.     Objective:     Vital Signs (Most Recent):  Temp: 98.4 °F (36.9 °C) (10/22/18 0745)  Pulse: (!) 53 (10/22/18 0755)  Resp: 18 (10/22/18 0745)  BP: (!) 172/76 (10/22/18 0745)  SpO2: 95 % (10/22/18 0745)    Vital Signs (24h Range):  Temp:  [97.5 °F (36.4 °C)-98.4 °F (36.9 °C)] 98.4 °F (36.9 °C)  Pulse:  [51-78] 53  Resp:  [16-20] 18  SpO2:  [92 %-99 %] 95 %  BP: (132-198)/(63-87) 172/76     Body mass index is 29.26 kg/m².    Physical Exam   Constitutional: She is oriented to person, place, and time. She appears well-developed and well-nourished.   HENT:   Head: Normocephalic and atraumatic.   Eyes: Right eye exhibits no discharge. Left eye exhibits no discharge. No scleral icterus.   Neck: Neck supple.   Cardiovascular: Intact distal pulses.   Pulmonary/Chest: Effort normal. No respiratory distress.   Abdominal: Soft. She exhibits distension.   Musculoskeletal: She exhibits no edema or deformity.   Neurological: She is alert and oriented to person, place, and time.   Mild dysarthria  RUE: 4/5.  LUE: 4/5.  Decreased effort noted for BLE testing.    Skin: Skin is warm and dry.   Psychiatric: She has a normal mood and affect. Her behavior is normal. Her speech is slurred.   Vitals reviewed.    NEUROLOGICAL EXAMINATION:     MENTAL STATUS   Oriented to person, place, and time.   Speech: slurred       Diagnostic Results:   Labs: Reviewed  ECG: Reviewed  X-Ray: Reviewed  CT: Reviewed  MRI: Reviewed  EEG:reviewed

## 2018-10-22 NOTE — ASSESSMENT & PLAN NOTE
- Likely infection induced  - [id number] score: 6  - Continue tele  - Continue Eliquis 5mg PO BID  - Will hold on repeat echo at this time given normal BNP  10/20: NSR on tele; will not pursue further work up at this time  10/22: 2DCFD with bubble contrast unremarkable

## 2018-10-22 NOTE — ASSESSMENT & PLAN NOTE
- UA dirty.  F/u urine gram stain and culture  - Start Ceftriaxone 1g IV q24h (start date 10/19) x 3 days  10/22: UCx shows E.Coli, pan-sensitive; D/C with Augmentin 500mg PO BID x 4 days

## 2018-10-22 NOTE — DISCHARGE SUMMARY
[facility] Hospital Medicine  Discharge Summary      Patient Name: Isa Sewell  MRN: [id number]  Admission Date: 10/19/2018  Hospital Length of Stay: 0 days  Discharge Date and Time: 10/22/2018  4:56 PM  Attending Physician: Linette att. providers found   Discharging Provider: Tonya Garcias PA-C  Primary Care Provider: Dariel Obregon MD  [facility] Medicine Team: [facility] HOSP MED V Tonya Garcias PA-C    HPI:   Ms. Isa Sewell is a 87 y.o. 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  The patient does not remember the events leading up to admission, but was told that she was at the hair salon when she felt numbness on her face, made the sign of the cross, and then developed profuse shaking of both upper extremities and her lip.  The event lasted for about 3-4 minutes, and was followed by a period of alteration in consciousness.  There was no bowel or bladder incontinence.  EMS was called, and on arrival the patient was alert but drowsy.  She was initially placed on non-rebreather for low oxygen sats, and was transitioned to a nasal cannula shortly after.  The patient denies any history of seizures, changes in medications, dysuria, numbness or tingling in the extremities.     In the emergency department, she was found to have a urinary tract infection and have new onset atrial fibrillation, rate controlled with HR 90s.  She was given ceftriaxone and was admitted to Hospital Medicine for further management.    * No surgery found *      Hospital Course:   87F, hx CVA, admitted to OBS for management and evaluation of possible seizure activity and UTI. MRI brain shows small acute cerebral infarct adjacent to old infarction, vasc neuro recommends continuing  "current regimen and long-term OAC (continue home eliquis). 2DCFD with bubble unremarkable. Keppra 500mg BID and 1hr EEG (read pending) monitoring per neuro recs. HTN uncontrolled, started Coreg 6.25mg BID. SLP/PMR evaluated patient, no further recs at this time. PT/OT rec continuing outpatient therapy. Pt stable for discharge with gen neuro and PCP follow up. Scripts given for Coreg 6.25mg BID, Keppra 500mg BID, and Augmentin 500mg BID x 4 days (last dose 10/26). Refill sent for Eliquis.     Consults:   Consults (From admission, onward)        Status Ordering Provider     Inpatient consult to Neurology  Once     Provider:  (Not yet assigned)    Completed JEAN ROSALES     Inpatient consult to Physical Medicine Rehab  Once     Provider:  (Not yet assigned)    Completed BRIANDA LANDERS     Inpatient consult to Vascular (Stroke) Neurology  Once     Provider:  (Not yet assigned)    Completed BRIANDA LANDERS          * Seizure    - Unclear at this time if new onset seizure, or if provoked by UTI  - Will give Keppra 1g IV x 1  - Seizures precautions  - Epilepsy consulted, appreciate assistance  10/20:   - MRI brain shows "acute lacunar type infarction in the right corona radiata, adjacent to the old right MCA vascular territory infarction.  No significant mass effect or acute intracranial hemorrhage"  - [facility] Neuro consulted (see below)  - Continue levetiracetam 500mg BID per neuro recs, appreciated  10/21: EEG pending  10/22: EEG done, read pending; F/u with general neurology      Thrombotic stroke involving right middle cerebral artery     MRI brain shows "acute lacunar type infarction in the right corona radiata, adjacent to the old right MCA vascular territory infarction.  No significant mass effect or acute intracranial hemorrhage"    - Per [facility] Neuro, imaging suggestive of small infarct," likely not cause of symptoms prompting admission"; continue current home regimen (plavix, eliquis, statin), recs " appreciated  10/22: 2DCFD with bubble unremarkable  - SLP/PMR no further recs at this time  - PT/OT rec continuing outpatient therapy     Benign essential HTN    - Chronic and stable  - Continue Norvasc 5mg PO daily  - Continue Olmesartan 40mg PO daily  10/20: stable  10/21: Hypertensive 180-190s/70-80s; start Coreg 6.25mg PO BID  PRN Metoprolol 5mg IV for sustained SBP >170  10/22: BP stable, script sent for Coreg 6.25mg PO BID and PCP f/u     Acute cystitis without hematuria    - UA dirty.  F/u urine gram stain and culture  - Start Ceftriaxone 1g IV q24h (start date 10/19) x 3 days  10/22: UCx shows E.Coli, pan-sensitive; D/C with Augmentin 500mg PO BID x 4 days     New onset a-fib    - Likely infection induced  - [id number] score: 6  - Continue tele  - Continue Eliquis 5mg PO BID  - Will hold on repeat echo at this time given normal BNP  10/20: NSR on tele; will not pursue further work up at this time  10/22: 2DCFD with bubble contrast unremarkable     Hypokalemia    - K 3.3, check Mag  - Replace with PO  10/20: 3.3 > 3.9, replace  Mg 1.7; replace  10/21: K 3.9, replace PO; Mg 1.9, replace PO     Hx of ischemic right MCA stroke    L sided weakness  Hyperlipidemia  - Sept 2018  - Continue Eliquis 5mg PO BID  - Continue Plavix 75mg PO daily  - Continue Lipitor 40mg PO daily  10/21: PT/OT rec continuing outpatient therapty     Chronic deep vein thrombosis (DVT) of femoral vein of left lower extremity    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  - Sept 2018  - Continue Eliquis 5mg PO BID       Final Active Diagnoses:    Diagnosis Date Noted POA    PRINCIPAL PROBLEM:  Seizure [R56.9] 10/19/2018 Yes    Thrombotic stroke involving right middle cerebral artery [I63.311] 10/20/2018 Yes    Benign essential HTN [I10] 06/09/2018 Yes    Acute cystitis without hematuria [N30.00] 10/19/2018 Yes    New onset a-fib [I48.91] 10/19/2018 Yes    Hypokalemia [E87.6] 10/19/2018 Yes    Hx of ischemic right  MCA stroke [Z86.73] 09/03/2018 Not Applicable    Chronic deep vein thrombosis (DVT) of femoral vein of left lower extremity [I82.512] 09/03/2018 Yes    Chronic saddle pulmonary embolism without acute cor pulmonale [I26.92] 09/03/2018 Yes    Left-sided weakness [R53.1] 08/07/2018 Yes    Hyperlipidemia [E78.5] 06/14/2018 Yes      Problems Resolved During this Admission:       Discharged Condition: stable    Disposition: Home or Self Care    Follow Up:  Follow-up Information     Dariel Obregon MD On 10/29/2018.    Specialty:  Internal Medicine  Why:  at 11:00 AM; Dr. Obregon not available  Contact information:  [address]  [address]  [facility] [id number] [phone]             [facility] NEUROLOGY In 2 weeks.    Specialty:  Neurology  Contact information:  [address]  [facility] [address]  [phone]               Patient Instructions:      No driving until:   Order Comments: No driving until cleared by neurologist     Notify your health care provider if you experience any of the following:  temperature >100.4     Notify your health care provider if you experience any of the following:  redness, tenderness, or signs of infection (pain, swelling, redness, odor or green/yellow discharge around incision site)     Notify your health care provider if you experience any of the following:  severe persistent headache     Notify your health care provider if you experience any of the following:  persistent dizziness, light-headedness, or visual disturbances     Notify your health care provider if you experience any of the following:  increased confusion or weakness     Activity as tolerated       Significant Diagnostic Studies: Labs:   BMP:   Recent Labs   Lab 10/21/18  0354 10/22/18  0312    111*    143   K 3.9 4.1   * 110   CO2 24 22*   BUN 17 17   CREATININE 0.8 0.7   CALCIUM 9.7 9.5   MG 1.8 1.8   , CMP   Recent Labs   Lab 10/21/18  0354 10/22/18  0312    143   K 3.9 4.1    * 110   CO2 24 22*    111*   BUN 17 17   CREATININE 0.8 0.7   CALCIUM 9.7 9.5   PROT 6.1 6.0   ALBUMIN 3.2* 3.1*   BILITOT 0.3 0.3   ALKPHOS 84 84   AST 18 18   ALT 16 14   ANIONGAP 9 11   ESTGFRAFRICA >60.0 >60.0   EGFRNONAA >60.0 >60.0   , CBC   Recent Labs   Lab 10/21/18  0354 10/22/18  0312   WBC 5.78 6.30   HGB 11.7* 11.6*   HCT 37.9 37.4    217   , Lipid Panel   Lab Results   Component Value Date    CHOL 159 06/09/2018    HDL 36 (L) 06/09/2018    LDLCALC 109.4 06/09/2018    TRIG 68 06/09/2018    CHOLHDL 22.6 06/09/2018    and All labs within the past 24 hours have been reviewed  Radiology: MRI: brain shows small acute infarct adjacent to previous  CT scan: CTH no acute intracranial process    Pending Diagnostic Studies:     None         Medications:  Reconciled Home Medications:      Medication List      START taking these medications    amoxicillin-clavulanate 500-125mg 500-125 mg Tab  Commonly known as:  AUGMENTIN  Take 1 tablet (500 mg total) by mouth 2 (two) times daily.     carvedilol 6.25 MG tablet  Commonly known as:  COREG  Take 1 tablet (6.25 mg total) by mouth 2 (two) times daily.     levETIRAcetam 500 MG Tab  Commonly known as:  KEPPRA  Take 1 tablet (500 mg total) by mouth 2 (two) times daily.        CONTINUE taking these medications    amLODIPine 5 MG tablet  Commonly known as:  NORVASC  Take 5 mg by mouth once daily.     apixaban 5 mg (74 tabs) Dspk  Commonly known as:  ELIQUIS  For the first 7 days take two 5 mg tablets twice daily.  After 7 days take one 5 mg tablet twice daily.     atorvastatin 40 MG tablet  Commonly known as:  LIPITOR  Take 1 tablet (40 mg total) by mouth once daily.     cetirizine 10 MG tablet  Commonly known as:  ZYRTEC  Take 10 mg by mouth once daily.     clobetasol 0.05% 0.05 % Oint  Commonly known as:  TEMOVATE  APPLY TO AFFECTED AREA TWICE DAILY. AVOID FACE, ARMPITS, AND GROIN.     clopidogrel 75 mg tablet  Commonly known as:  PLAVIX  Take 1 tablet  (75 mg total) by mouth once daily.     melatonin 3 mg Tab  Take 1 tablet (3 mg total) by mouth every evening.     olmesartan 40 MG tablet  Commonly known as:  BENICAR  Take 1 tablet (40 mg total) by mouth once daily.     phenazopyridine 200 MG tablet  Commonly known as:  PYRIDIUM  Take 200 mg by mouth 3 (three) times daily as needed for Pain.     triamcinolone acetonide 0.1% 0.1 % ointment  Commonly known as:  KENALOG            Indwelling Lines/Drains at time of discharge:   Lines/Drains/Airways          None          Time spent on the discharge of patient: >35  minutes  Patient was seen and examined on the date of discharge and determined to be suitable for discharge.       Discussed with staff.  Tonya Garcias PA-C  Department of Hospital Medicine  [facility]

## 2018-10-22 NOTE — PLAN OF CARE
10/22/18 0920   Discharge Assessment   Assessment Type Discharge Planning Assessment   Confirmed/corrected address and phone number on facesheet? Yes   Assessment information obtained from? Patient   Expected Length of Stay (days) 3   Communicated expected length of stay with patient/caregiver yes   Prior to hospitilization cognitive status: Alert/Oriented   Prior to hospitalization functional status: Assistive Equipment   Current cognitive status: Alert/Oriented   Current Functional Status: Needs Assistance   Lives With child(shlomo), adult;spouse  (spouse & adult daughter Poonam Tomas)   Able to Return to Prior Arrangements yes   Is patient able to care for self after discharge? No   Patient's perception of discharge disposition other (comments)  (out pt PT/OT)   Readmission Within The Last 30 Days no previous admission in last 30 days   Patient currently being followed by outpatient case management? No   Patient currently receives any other outside agency services? No   Equipment Currently Used at Home cane, straight;walker, rolling;walker, standard;bedside commode;shower chair;other (see comments)  (BP machine)   Do you have any problems affording any of your prescribed medications? No   Is the patient taking medications as prescribed? yes   Does the patient have transportation home? Yes   Transportation Available family or friend will provide  (daughter, Melanie Graff ([phone]))   Does the patient receive services at the [facility]? No   Discharge Plan A Other;Home with family  (out pt PT/OT)   Discharge Plan B Home Health   Patient/Family In Agreement With Plan yes     Dx: seizure  Consult: [facility] neuro, neuro, PT/OT/SLP  Pharm: Sophia Combs  Hosp f/u appt: JAVI Castro on 10/29/18 at 1100; Message sent to Dr. Vaibhav Curtis's (neuro) requesting a hospital follow up appointment in 1-2 weeks. Dr. Curtis's nurse to call the patient with appointment date & time.

## 2018-10-22 NOTE — HPI
Isa Sewell is a 87-year-old female with PMHx of HTN, HLP, L femoral vein DVT with subsequent saddle PE (on Apixaban), R MCA stroke (with residual left-sided weakness & mild dysarthria).  Patient presented to [facility] on 10/19 with facial numbness and then profuse shaking of both upper extremities and her lip while at the hair salon with altered level of consciousness.  CTH revealed no acute pathology. MRI revealed small acute lacunar type infarction in the right corona radiata, adjacent to the old right MCA vascular territory infarction (VN consulted, 2DCFD with bubble pending).    Hospital course further complicated by UTI (treated with Rocephin), new onset A-fib (currently rate controlled and already on Apixaban), & hypokalemia.   Neurology consulted and state she had an event consistent with a seizure likely due to her known stroke, and further triggered by UTI.    Functional History: Patient lives in [place] with  and daughter in a single story home with no steps to enter.  Prior to admission, (I) with ADLs and prn RW for mobility. DME: RW, SPC.

## 2018-10-22 NOTE — ASSESSMENT & PLAN NOTE
L sided weakness  Hyperlipidemia  - Sept 2018  - Continue Eliquis 5mg PO BID  - Continue Plavix 75mg PO daily  - Continue Lipitor 40mg PO daily  10/21: PT/OT rec continuing outpatient therapty

## 2018-10-22 NOTE — ASSESSMENT & PLAN NOTE
"- Unclear at this time if new onset seizure, or if provoked by UTI  - Will give Keppra 1g IV x 1  - Seizures precautions  - Epilepsy consulted, appreciate assistance  10/20:   - MRI brain shows "acute lacunar type infarction in the right corona radiata, adjacent to the old right MCA vascular territory infarction.  No significant mass effect or acute intracranial hemorrhage"  - [facility] Neuro consulted (see below)  - Continue levetiracetam 500mg BID per neuro recs, appreciated  10/21: EEG pending  10/22: EEG done, read pending; F/u with general neurology   "

## 2018-10-22 NOTE — PLAN OF CARE
Problem: Fall Risk (Adult)  Goal: Absence of Falls  Patient will demonstrate the desired outcomes by discharge/transition of care.  Outcome: Ongoing (interventions implemented as appropriate)   10/22/18 0601   Fall Risk (Adult)   Absence of Falls making progress toward outcome       Problem: Patient Care Overview  Goal: Plan of Care Review  Outcome: Ongoing (interventions implemented as appropriate)   10/22/18 0601   Coping/Psychosocial   Plan Of Care Reviewed With patient;daughter     Pt continues on telemetry monitoring, No distress/discomfort noted in pt, no seizure noted/reported this shift, all precautions maintained.

## 2018-10-22 NOTE — PT/OT/SLP PROGRESS
"Physical Therapy Treatment    Patient Name:  Isa Sewell   MRN:  [id number]    Recommendations:     Discharge Recommendations:  outpatient PT   Discharge Equipment Recommendations:   none  Barriers to discharge: None    Assessment:     Isa Sewell is a 87 y.o. female admitted with a medical diagnosis of Seizure.  She presents with the following impairments/functional limitations:  weakness, impaired endurance, impaired functional mobilty, gait instability, impaired balance. Pt tolerated session well with focus on gait training and transfers. Pt progressing well and ambulates with SBA and RW use. Pt owns both rollator and RW devices but is educated on using RW device to begin with once d/c'd in order to maintain safety. Pt will continue to benefit from therapy services in the OP setting to improve impairments listed above.     Rehab Prognosis:  Good ; patient would benefit from acute skilled PT services to address these deficits and reach maximum level of function.      Recent Surgery: * No surgery found *      Plan:     During this hospitalization, patient to be seen 3 x/week to address the above listed problems via gait training, therapeutic activities, therapeutic exercises, neuromuscular re-education  · Plan of Care Expires:  11/21/18   Plan of Care Reviewed with: patient, daughter    Subjective     Communicated with NSG prior to session.  Patient found supine upon PTA entry to room, agreeable to treatment.      Chief Complaint: no c/o  Patient comments/goals: "I'm ready to go home."   Pain/Comfort:  · Pain Rating 1: 0/10  · Pain Rating Post-Intervention 1: 0/10    Patients cultural, spiritual, Taoism conflicts given the current situation:      Objective:     Patient found with: telemetry, oxygen     General Precautions: Standard, fall   Orthopedic Precautions:N/A   Braces: N/A     Functional Mobility:  · Bed Mobility:     · Supine to Sit: modified independence  · Transfers:     · Sit to Stand:  stand " by assistance with rolling walker  · Bed to Chair: stand by assistance with  rolling walker  using  Stand Pivot  · Gait: Pt ambulates 80 ft x 2 trials with RW and SBA. Pt with shortened step length and stance phase on LLE. Seated rest between trials.   · Balance: Pt sits and stands with good balance.       AM-PAC 6 CLICK MOBILITY  Turning over in bed (including adjusting bedclothes, sheets and blankets)?: 4  Sitting down on and standing up from a chair with arms (e.g., wheelchair, bedside commode, etc.): 3  Moving from lying on back to sitting on the side of the bed?: 4  Moving to and from a bed to a chair (including a wheelchair)?: 3  Need to walk in hospital room?: 3  Climbing 3-5 steps with a railing?: 3  Basic Mobility Total Score: 20       Therapeutic Activities and Exercises:   Pt assisted with functional mobility as noted above.   Pt and daughter educated on use of RW as safest AD for home use upon d/c.   Pt and daughter with no further questions or concerns.     Patient left up in chair with call button in reach and daughter present.    GOALS:   Multidisciplinary Problems     Physical Therapy Goals        Problem: Physical Therapy Goal    Goal Priority Disciplines Outcome Goal Variances Interventions   Physical Therapy Goal     PT, PT/OT Ongoing (interventions implemented as appropriate)     Description:  Goals to be met by: 10/31/2018     Patient will increase functional independence with mobility by performin. Supine to sit with Supervision  2. Sit to supine with Supervision  3. Sit to stand transfer with Supervision  4. Gait  x 150 feet with Supervision with or without appropriate AD.   5. Lower extremity exercise program x15 reps per handout, with supervision                      Time Tracking:     PT Received On: 10/22/18  PT Start Time: 1533    PT Stop Time: 1545  PT Total Time (min): 12 min     Billable Minutes: Gait Training 12    Treatment Type: Treatment  PT/PTA: PTA     PTA Visit Number: 1      Timothy Caldwell, PTA  10/22/2018

## 2018-10-22 NOTE — ASSESSMENT & PLAN NOTE
- MRI revealed small acute lacunar type infarction in the right corona radiata, adjacent to the old right MCA vascular territory infarction  -VN following  -bubble study pending  -Neurology following for seizure    See hospital course for functional, cognitive/speech/language, and nutrition/swallow status.      Recommendations  -  Encourage mobility, OOB in chair at least 3 hours per day, and early ambulation as appropriate   -  PT/OT evaluate and treat  -  SLP speech and cognitive evaluate and treat  -  Monitor sleep disturbances and establish consistent sleep-wake cycle  -  Monitor for bowel and bladder dysfunction  -  Monitor for shoulder pain and subluxation  -  Monitor for spasticity  -  Monitor for and prevent skin breakdown and pressure ulcers  · Early mobility, repositioning/weight shifting every 20-30 minutes when sitting, turn patient every 2 hours, proper mattress/overlay and chair cushioning, pressure relief/heel protector boots  -  DVT prophylaxis:  apixaban  -  Reviewed discharge options (IP rehab, SNF, HH therapy, and OP therapy)

## 2018-10-22 NOTE — HOSPITAL COURSE
10/21/2018: Evaluated by PT/SLP.  Bed mobility CGA.  Sit to stand CGA.  Ambulated 45 ft CGA and mild SOB. Passed bedside swallow evaluation.  SLP recommending regular diet and thin liquids. SLP diagnosis of mild Dysarthria. Patient presents at baseline for swallowing, speech, language, and cognition.   10/22/18: OT be pending.

## 2018-10-22 NOTE — PLAN OF CARE
Problem: Physical Therapy Goal  Goal: Physical Therapy Goal  Goals to be met by: 10/31/2018     Patient will increase functional independence with mobility by performin. Supine to sit with Supervision  2. Sit to supine with Supervision  3. Sit to stand transfer with Supervision  4. Gait  x 150 feet with Supervision with or without appropriate AD.   5. Lower extremity exercise program x15 reps per handout, with supervision     Outcome: Ongoing (interventions implemented as appropriate)  Goals remain appropriate.

## 2018-10-22 NOTE — DISCHARGE INSTRUCTIONS
No driving until six months seizure free or cleared by her neurologist.  Continue taking Eliquis, refill sent to pharmacy if needed.

## 2018-10-22 NOTE — PT/OT/SLP EVAL
Occupational Therapy   Evaluation    Name: Isa Sewell  MRN: [id number]  Admitting Diagnosis:  Seizure      Recommendations:     Discharge Recommendations: home with home health  Discharge Equipment Recommendations:  none  Barriers to discharge:  None    History:     Occupational Profile:  Living Environment: Pt lives with spouse and daughter in 1 story house with no steps   Previous level of function: Pt was able to complete self care prior to admission   Equipment Used at Home:  walker, rolling, cane, straight  Assistance upon Discharge: family to assist     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  Patient found all lines intact and oxygen, telemetry upon OT entry to room.    General Precautions: Standard, fall   Orthopedic Precautions:N/A   Braces: N/A     Occupational Performance:    Bed Mobility:    · Pt with CGA for safe bed mobility       Activities of Daily Living:  · Pt with overall CGA for self care completion     Cognitive/Visual Perceptual:  Cognitive/Psychosocial Skills:     -       Oriented to: Person, Place, Time and Situation   -       Follows Commands/attention:Follows two-step commands  -       Communication: clear/fluent  -       Memory: No Deficits noted  -       Safety  "awareness/insight to disability: intact   -       Mood/Affect/Coping skills/emotional control: Appropriate to situation    Physical Exam:  Upper Extremity Range of Motion:     -       Right Upper Extremity: WFL    -       Left Upper Extremity: WFL      Upper Extremity Strength:    -       Right Upper Extremity: WFL  -       Left Upper Extremity: WFL      AMPAC 6 Click ADL:  AMPAC Total Score: 20    Treatment & Education:  Evaluation complete and goals set.  Pt educated on safety, role of OT, importance of increased participation in self care for gains , expectations for participation, expectations for gains, POC, energy conservation, caregiver strain. White board updated.     Education:    Patient left supine with all lines intact    Assessment:     Isa Sewell is a 87 y.o. female with a medical diagnosis of Seizure.  She presents with the following performance deficits affecting function: impaired endurance, impaired self care skills, impaired functional mobilty.      Rehab Prognosis: Good; patient would benefit from acute skilled OT services to address these deficits and reach maximum level of function.         Clinical Decision Makin.  OT Low:  "Pt evaluation falls under low complexity for evaluation coding due to performance deficits noted in 1-3 areas as stated above and 0 co-morbities affecting current functional status. Data obtained from problem focused assessments. No modifications or assistance was required for completion of evaluation. Only brief occupational profile and history review completed."     Plan:     Patient to be seen 3 x/week to address the above listed problems via self-care/home management, therapeutic activities, therapeutic exercises  · Plan of Care Expires: 18  · Plan of Care Reviewed with: patient, daughter    This Plan of care has been discussed with the patient who was involved in its development and understands and is in agreement with the identified goals and " treatment plan    GOALS:   Multidisciplinary Problems     Occupational Therapy Goals     Not on file                Time Tracking:     OT Date of Treatment: 10/22/18  OT Start Time: 0730  OT Stop Time: 0745  OT Total Time (min): 15 min    Billable Minutes:Evaluation 15    Genevieve Perez, OT  10/22/2018

## 2018-10-22 NOTE — PROGRESS NOTES
Patient received discharge instructions and verbalized understanding. IV discontinued with catheter tip intact. Patient disconnected from telemetry monitor. Patient and daughter in room waiting for wheelchair transport. Will continue to monitor.

## 2018-10-22 NOTE — TELEPHONE ENCOUNTER
----- Message from Dacia Saavedra sent at 10/22/2018  4:16 PM CDT -----  Contact: pt @ [phone]  Calling to scheduled an appt with Dr. Mata per discharge instructions. Please call.

## 2018-10-22 NOTE — ASSESSMENT & PLAN NOTE
-Neurology following and state she had an event consistent with a seizure likely due to her known stroke, and further triggered by UTI  -EEG pending

## 2018-10-22 NOTE — CONSULTS
[facility]  Physical Medicine & Rehab  Consult Note    Patient Name: Isa Sewell  MRN: [id number]  Admission Date: 10/19/2018  Hospital Length of Stay: 0 days  Attending Physician: JOSÉ MIGUEL Oliva MD     Inpatient consult to Physical Medicine & Rehabilitation  Consult performed by: Raine Jesus NP  Consult requested by:  JOSÉ MIGUEL Oliva MD    Reason for Consult:  assess rehabilitation needs  Consults  Subjective:     Principal Problem: Seizure    HPI: Isa Sewell is a 87-year-old female with PMHx of HTN, HLP, L femoral vein DVT with subsequent saddle PE (on Apixaban), R MCA stroke (with residual left-sided weakness & mild dysarthria).  Patient presented to [facility] on 10/19 with facial numbness and then profuse shaking of both upper extremities and her lip while at the hair salon with altered level of consciousness.  CTH revealed no acute pathology. MRI revealed small acute lacunar type infarction in the right corona radiata, adjacent to the old right MCA vascular territory infarction (VN consulted, 2DCFD with bubble pending).    Hospital course further complicated by UTI (treated with Rocephin), new onset A-fib (currently rate controlled and already on Apixaban), & hypokalemia. Neurology consulted and state she had an event consistent with a seizure likely due to her known stroke, and further triggered by UTI.    Functional History: Patient lives in [place] with  and daughter in a single story home with no steps to enter.  Prior to admission, (I) with ADLs and prn RW for mobility. DME: RW, SPC.     Hospital Course: 10/21/2018: Evaluated by PT/SLP.  Bed mobility CGA.  Sit to stand CGA.  Ambulated 45 ft CGA and mild SOB. Passed bedside swallow evaluation.  SLP recommending regular diet and thin liquids. SLP diagnosis of mild Dysarthria. Patient presents at baseline for swallowing, speech, language, and cognition.   10/22/18: OT eval pending.     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/2018    Hypertension     Pemphigus 4/4/2016    Seizure 10/19/2018     Past Surgical History:   Procedure Laterality Date    BREAST BIOPSY Bilateral     BOTH BENIGN    CHOLECYSTECTOMY      FRACTURE SURGERY Right     ankle    HYSTERECTOMY      TONSILLECTOMY       Review of patient's allergies indicates:   Allergen Reactions    Sulfa (sulfonamide antibiotics) Hives       Scheduled Medications:    amLODIPine  5 mg Oral Daily    apixaban  5 mg Oral BID    atorvastatin  40 mg Oral Daily    carvedilol  6.25 mg Oral BID    cefTRIAXone (ROCEPHIN) IVPB  1 g Intravenous Q24H    cetirizine  10 mg Oral Daily    clopidogrel  75 mg Oral Daily    levETIRAcetam  500 mg Oral BID    magnesium oxide  400 mg Oral BID    olmesartan  40 mg Oral Daily       PRN Medications: acetaminophen, dextrose 50%, dextrose 50%, glucagon (human recombinant), glucose, glucose, metoprolol, ondansetron, pneumoc 13-neela conj-dip cr(PF), ramelteon, senna-docusate 8.6-50 mg, sodium chloride 0.9%    Family History     None        Tobacco Use    Smoking status: Never Smoker    Smokeless tobacco: Never Used   Substance and Sexual Activity    Alcohol use: No    Drug use: No    Sexual activity: Not on file     Review of Systems   Constitutional: Negative for chills, fatigue and fever.   HENT: Negative for trouble swallowing and voice change.    Eyes: Negative for photophobia and visual disturbance.   Respiratory: Negative for cough, shortness of breath and wheezing.    Cardiovascular: Negative for chest pain and palpitations.   Gastrointestinal: Negative for abdominal distention, nausea and vomiting.   Genitourinary: Negative for difficulty urinating and flank pain.   Musculoskeletal: Positive for gait problem. Negative for arthralgias.   Skin: Negative for color change  and rash.   Neurological: Positive for weakness. Negative for facial asymmetry, speech difficulty, numbness and headaches.   Psychiatric/Behavioral: Negative for agitation and confusion.     Objective:     Vital Signs (Most Recent):  Temp: 98.4 °F (36.9 °C) (10/22/18 0745)  Pulse: (!) 53 (10/22/18 0755)  Resp: 18 (10/22/18 0745)  BP: (!) 172/76 (10/22/18 0745)  SpO2: 95 % (10/22/18 0745)    Vital Signs (24h Range):  Temp:  [97.5 °F (36.4 °C)-98.4 °F (36.9 °C)] 98.4 °F (36.9 °C)  Pulse:  [51-78] 53  Resp:  [16-20] 18  SpO2:  [92 %-99 %] 95 %  BP: (132-198)/(63-87) 172/76     Body mass index is 29.26 kg/m².    Physical Exam   Constitutional: She is oriented to person, place, and time. She appears well-developed and well-nourished.   HENT:   Head: Normocephalic and atraumatic.   Eyes: Right eye exhibits no discharge. Left eye exhibits no discharge. No scleral icterus.   Neck: Neck supple.   Cardiovascular: Intact distal pulses.   Pulmonary/Chest: Effort normal. No respiratory distress.   Abdominal: Soft. She exhibits distension.   Musculoskeletal: She exhibits no edema or deformity.   Neurological: She is alert and oriented to person, place, and time.   Mild dysarthria  RUE: 4/5.  LUE: 3/5.  Decreased effort noted for BLE testing.    Skin: Skin is warm and dry.   Psychiatric: She has a normal mood and affect. Her behavior is normal. Her speech is slurred.       Diagnostic Results:   Labs: Reviewed  ECG: Reviewed  X-Ray: Reviewed  CT: Reviewed  MRI: Reviewed  EEG:reviewed    Assessment/Plan:     * Seizure    -Neurology following and state she had an event consistent with a seizure likely due to her known stroke, and further triggered by UTI  -EEG pending      Thrombotic stroke involving right middle cerebral artery    - MRI revealed small acute lacunar type infarction in the right corona radiata, adjacent to the old right MCA vascular territory infarction  -VN following  -bubble study pending  -Neurology following for  seizure    See hospital course for functional, cognitive/speech/language, and nutrition/swallow status.      Recommendations  -  Encourage mobility, OOB in chair at least 3 hours per day, and early ambulation as appropriate   -  PT/OT evaluate and treat  -  SLP speech and cognitive evaluate and treat  -  Monitor sleep disturbances and establish consistent sleep-wake cycle  -  Monitor for bowel and bladder dysfunction  -  Monitor for shoulder pain and subluxation  -  Monitor for spasticity  -  Monitor for and prevent skin breakdown and pressure ulcers  · Early mobility, repositioning/weight shifting every 20-30 minutes when sitting, turn patient every 2 hours, proper mattress/overlay and chair cushioning, pressure relief/heel protector boots  -  DVT prophylaxis:  apixaban  -  Reviewed discharge options (IP rehab, SNF, HH therapy, and OP therapy)       New onset a-fib    -on Apixaban  -currently rate controlled     Acute cystitis without hematuria    -UA concerning for UTI  -receiving Rocephin      Chronic saddle pulmonary embolism without acute cor pulmonale    -on Apixaban     Chronic deep vein thrombosis (DVT) of femoral vein of left lower extremity    -on Apixaban     Left-sided weakness    -baseline from stroke 06/2018  -received IRF at [facility]     OT evaluation pending. Will follow progress and discuss with rehab team for post acute care/rehab recommendation.        Thank you for your consult.     Raine Jesus NP  Department of Physical Medicine & Rehab  [facility]

## 2018-10-22 NOTE — PROGRESS NOTES
Patient identified by 2 identifiers.   Allergies reviewed.  18g IV in place to Rt AC.  Bubble study explained to patient, patient verbalized understanding.  Bubble performed X 2, with & without Valsalva.  Pt tolerated procedure well.

## 2018-10-22 NOTE — ASSESSMENT & PLAN NOTE
" MRI brain shows "acute lacunar type infarction in the right corona radiata, adjacent to the old right MCA vascular territory infarction.  No significant mass effect or acute intracranial hemorrhage"    - Per [facility], imaging suggestive of small infarct," likely not cause of symptoms prompting admission"; continue current home regimen (plavix, eliquis, statin), recs appreciated  10/22: 2DCFD with bubble unremarkable  - SLP/PMR no further recs at this time  - PT/OT rec continuing outpatient therapy  "

## 2018-10-23 NOTE — PLAN OF CARE
10/23/18 0737   Final Note   Assessment Type Final Discharge Note     Patient discharged home on 10/22/18 to resume out patient therapy. Discharge summary faxed to Dr. Dariel Obregon. (PCP) f [phone].

## 2018-10-24 NOTE — PT/OT/SLP DISCHARGE
Physical Therapy Discharge Summary    Name: Isa Sewell  MRN: [id number]   Principal Problem: Seizure     Patient Discharged from acute Physical Therapy on 10/22/2018.  Please refer to prior PT noted date on 10/22/2018 for functional status.     Assessment:     Patient was discharged unexpectedly.  Information required to complete an accurate discharge summary is unknown.  Refer to therapy initial evaluation and last progress note for initial and most recent functional status and goal achievement.  Recommendations made may be found in medical record.    Objective:     GOALS:   Multidisciplinary Problems     Physical Therapy Goals        Problem: Physical Therapy Goal    Goal Priority Disciplines Outcome Goal Variances Interventions   Physical Therapy Goal     PT, PT/OT Ongoing (interventions implemented as appropriate)     Description:  Goals to be met by: 10/31/2018     Patient will increase functional independence with mobility by performin. Supine to sit with Supervision  2. Sit to supine with Supervision  3. Sit to stand transfer with Supervision  4. Gait  x 150 feet with Supervision with or without appropriate AD.   5. Lower extremity exercise program x15 reps per handout, with supervision                      Reasons for Discontinuation of Therapy Services  Transfer to alternate level of care.      Plan:     Patient Discharged to: Outpatient Therapy Services.    ANGEL CALDERON, PT  10/24/2018

## 2018-10-28 NOTE — PROCEDURES
ROUTINE ELECTROENCEPHALOGRAM REPORT      Isa Sewell  [id number]  6/11/1931    DATE OF SERVICE: 10/21/18    REQUESTING PROVIDER: Elenita Sweeney MD    REASON FOR CONSULT: 86yo F with multiple medical co-morbidities and AMS.    METHODOLOGY   Electroencephalographic (EEG) recording is with electrodes placed according to the International 10-20 placement system.  Thirty two (32) channels of digital signal (sampling rate of 512/sec) including T1 and T2 was simultaneously recorded from the scalp and may include  EKG, EMG, and/or eye monitors.  Recording band pass was 0.1 to 512 hz.  Digital video recording of the patient is simultaneously recorded with the EEG.  The patient is instructed report clinical symptoms which may occur during the recording session.  EEG and video recording is stored and archived in digital format. Activation procedures which include photic stimulation, hyperventilation and instructing patients to perform simple task are done in selected patients.    The EEG is displayed on a monitor screen and can be reviewed using different montages.  Computer assisted analysis is employed to detect spike and electrographic seizure activity.   The entire record is submitted for computer analysis.  The entire recording is visually reviewed and the times identified by computer analysis as being spikes or seizures are reviewed again.  Compresses spectral analysis (CSA) is also performed on the activity recorded from each individual channel.  This is displayed as a power display of frequencies from 0 to 30 Hz over time.   The CSA is reviewed looking for asymmetries in power between homologous areas of the scalp and then compared with the original EEG recording.     Spacious software was also utilized in the review of this study.  This software suite analyzes the EEG recording in multiple domains.  Coherence and rhythmicity is computed to identify EEG sections which may contain organized seizures.  Each channel  undergoes analysis to detect presence of spike and sharp waves which have special and morphological characteristic of epileptic activity.  The routine EEG recording is converted from spacial into frequency domain.  This is then displayed comparing homologous areas to identify areas of significant asymmetry.  Algorithm to identify non-cortically generated artifact is used to separate eye movement, EMG and other artifact from the EEG    EEG FINDINGS  Background activity:   The background rhythm was characterized by theta-alpha (7-10 Hz) activity with a 10 Hz posterior dominant alpha rhythm at 30-70 microvolts.   Symmetry and continuity: The background was continuous and symmetric    Sleep:   Normal sleep transients including sleep spindles, K complexes, vertex waves and POSTS were seen.    Activation procedures:   Hyperventilation and photic stimulation were not performed   Appropriately responsive to verbal stimulation with reactivity on EEG noted.    Abnormal activity:   No epileptiform discharges, periodic discharges, lateralized rhythmic delta activity or electrographic seizures were seen.    IMPRESSION:   This is a normal routine EEG in awake, drowsy and asleep states.    A normal EEG does not rule out seizures/epilepsy.  CLINICAL CORRELATION IS RECOMMENDED.    Barbara Ramos MD, KEVYN(), OLIVER ANNE.  Neurology-Epilepsy.  [facility].

## 2018-11-02 ENCOUNTER — OFFICE VISIT (OUTPATIENT)
Dept: NEUROLOGY | Facility: CLINIC | Age: 83
End: 2018-11-02
Payer: MEDICARE

## 2018-11-02 VITALS
HEART RATE: 45 BPM | HEIGHT: 62 IN | WEIGHT: 160.06 LBS | BODY MASS INDEX: 29.45 KG/M2 | DIASTOLIC BLOOD PRESSURE: 59 MMHG | SYSTOLIC BLOOD PRESSURE: 131 MMHG

## 2018-11-02 DIAGNOSIS — G40.909 SEIZURE DISORDER: Primary | ICD-10-CM

## 2018-11-02 DIAGNOSIS — Z86.73 CHRONIC ARTERIAL ISCHEMIC STROKE: ICD-10-CM

## 2018-11-02 PROCEDURE — 99999 PR PBB SHADOW E&M-EST. PATIENT-LVL IV: CPT | Mod: PBBFAC,,, | Performed by: PSYCHIATRY & NEUROLOGY

## 2018-11-02 PROCEDURE — 99214 OFFICE O/P EST MOD 30 MIN: CPT | Mod: PBBFAC,PO | Performed by: PSYCHIATRY & NEUROLOGY

## 2018-11-02 PROCEDURE — 99214 OFFICE O/P EST MOD 30 MIN: CPT | Mod: S$PBB,,, | Performed by: PSYCHIATRY & NEUROLOGY

## 2018-11-02 NOTE — PATIENT INSTRUCTIONS
1. Please followup as needed. Continue to take keppra as prescribed.    Restrictions for uncontrolled seizures/epilepsy  Individual is to not engage in the following activities:  Driving  Climbing  Operating dangerous equipment  Cooking  Swimming alone  Scuba Diving  Joseph Diving  Hang Gliding  handling firearms   Handling knives or other sharp objects  Other activities in which a lapse of awareness could lead to injury   to the individual or others.

## 2018-11-05 NOTE — PROGRESS NOTES
Neurology Clinic Visit  Primary Care Provider: Dariel Thurman  Referring Provider: brooke Li  Date of Visit: 11/02/2018  Reason for referral: hospital followup for seizure     chief complaint:   Chief Complaint   Patient presents with    Seizures       History of Present Illness:  Isa Sewell is a 87 y.o. right handed female I have been asked to consult for evaluation and treatment of  Seizure.  She was accompanied by her two daughters. She has a history of a right MCA stroke with residual left-sided weakness who is on Eliquis and Plavix.  In October 2018 she was at a hair salon where she started to feel funny and then felt herself trembling and then had loss of consciousness.  It was described that she had full body shaking and stiffening that lasted for about 5 min of which the patient does not recall.  She was taken to [facility] where she was started on Keppra 500 mg b.i.d. She had a routine EEG done during hospitalization that was normal.  MRI of the brain showed encephalomalacia in the right MCA distribution and concern for possible new ischemic stroke; however, she was seen Vascular neurology who felt that the finding on MRI was shine through .  During that hospitalization she was found to have new onset atrial fibrillation.  The patient denies any side effects from the Keppra.  She reports she is tolerating it well.  She has not had any more seizures.      She denies having a seizure in the past other than the one for which she was hospitalized.      Patient Active Problem List    Diagnosis Date Noted    Thrombotic stroke involving right middle cerebral artery 10/20/2018    Acute cystitis without hematuria 10/19/2018    Seizure 10/19/2018    New onset a-fib 10/19/2018    Hypokalemia 10/19/2018    Chronic deep vein thrombosis (DVT) of femoral vein of left lower extremity 09/03/2018    Chronic saddle pulmonary embolism without acute cor pulmonale 09/03/2018    Hx of ischemic right  MCA stroke 09/03/2018    Left-sided weakness 08/07/2018    Impaired functional mobility, balance, gait, and endurance 08/07/2018    Hyperlipidemia 06/14/2018    Dysphagia 06/12/2018    Embolic stroke involving right middle cerebral artery 06/09/2018    Benign essential HTN 06/09/2018    Chronic left-sided low back pain without sciatica 01/04/2018    Pemphigus 04/04/2016     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/2016    Seizure 10/19/2018     Past Surgical History:   Procedure Laterality Date    BREAST BIOPSY Bilateral     BOTH BENIGN    CHOLECYSTECTOMY      FRACTURE SURGERY Right     ankle    HYSTERECTOMY      TONSILLECTOMY       Family History   Problem Relation Age of Onset    Melanoma Neg Hx     Psoriasis Neg Hx     Lupus Neg Hx     Eczema Neg Hx          Current Outpatient Medications   Medication Sig    amlodipine (NORVASC) 5 MG tablet Take 5 mg by mouth once daily.    amoxicillin-clavulanate 500-125mg (AUGMENTIN) 500-125 mg Tab Take 1 tablet (500 mg total) by mouth 2 (two) times daily.    apixaban (ELIQUIS) 5 mg (74 tabs) DsPk For the first 7 days take two 5 mg tablets twice daily.  After 7 days take one 5 mg tablet twice daily.    atorvastatin (LIPITOR) 40 MG tablet Take 1 tablet (40 mg total) by mouth once daily.    carvedilol (COREG) 6.25 MG tablet Take 1 tablet (6.25 mg total) by mouth 2 (two) times daily.    cetirizine (ZYRTEC) 10 MG tablet Take 10 mg by mouth once daily.    clobetasol 0.05% (TEMOVATE) 0.05 % Oint APPLY TO AFFECTED AREA TWICE DAILY. AVOID FACE, ARMPITS, AND GROIN.    clopidogrel (PLAVIX) 75 mg tablet Take 1 tablet (75 mg total) by mouth once daily.    levETIRAcetam (KEPPRA) 500 MG Tab Take 1 tablet (500 mg total) by mouth 2 (two) times daily.     melatonin 3 mg Tab Take 1 tablet (3 mg total) by mouth every evening.    olmesartan (BENICAR) 40 MG tablet Take 1 tablet (40 mg total) by mouth once daily.    phenazopyridine (PYRIDIUM) 200 MG tablet Take 200 mg by mouth 3 (three) times daily as needed for Pain.    triamcinolone acetonide 0.1% (KENALOG) 0.1 % ointment      No current facility-administered medications for this visit.        Review of patient's allergies indicates:   Allergen Reactions    Sulfa (sulfonamide antibiotics) Hives     Social History     Socioeconomic History    Marital status:      Spouse name: Not on file    Number of children: Not on file    Years of education: Not on file    Highest education level: Not on file   Social Needs    Financial resource strain: Not on file    Food insecurity - worry: Not on file    Food insecurity - inability: Not on file    Transportation needs - medical: Not on file    Transportation needs - non-medical: Not on file   Occupational History    Occupation: retired   Tobacco Use    Smoking status: Never Smoker    Smokeless tobacco: Never Used   Substance and Sexual Activity    Alcohol use: No    Drug use: No    Sexual activity: Not on file   Other Topics Concern    Are you pregnant or think you may be? No    Breast-feeding No   Social History Narrative    Lives in [place] with her  and sometimes her grandson. She enjoys cooking. She has 5 children. She is Denominational and attends [employer].        Review of Systems  Constitutional: negative  Eyes: negative  Ears, nose, mouth, throat, and face: negative  Respiratory: negative  Cardiovascular: negative  Gastrointestinal: negative  Genitourinary:negative  Integument/breast: negative  Hematologic/lymphatic: negative  Musculoskeletal:negative  Neurological: negative  Behavioral/Psych: negative    Objective:  Vital signs in last 24 hours:    Vitals:    11/02/18 1309   BP: (!) 131/59   Pulse: (!) 45   Weight: 72.6 kg (160 lb 0.9 oz)  "  Height: 5' 2" (1.575 m)     General: no acute distress, well nourished, well-groomed  CVS: RRR, no murmur, gallops or rubs  Respiratory: Clear to ausculation  Extremities: no edema    Neurological Examination:    HIGHER INTEGRATIVE FUNCTIONS:  -Normal attention span and concentration; immediately responds to questions and commands  -Oriented to time, place and person  -Recent and remote memory intact  -Language normal (no aphasia or dysarthria)  -Normal fund of knowledge    CN:  -PERRLA, visual fields full, unable to visualize optic discs due to small pupils on fundus examination  -EOMI with normal saccades and smooth pursuit  -Facial sensation intact bilaterally  -Facial strength/movement intact on right but there is left UMN facial droop  -Hearing intact to voice  -Palate elevates symmetrically  -Normal shoulder shrug and head turn  -Tongue protrudes midline    MOTOR: (left/right graded 1-5)  -UE: 5/5 deltoid; 5/5 bicep, tricep; 5/5 wrist flexor, extensor; 5/5 interosseous; 5/5  on right but 4+/5 on left  -LEs: 5/5 hip flexion, extension; 5/5 knee flexion, extension; 5/5 ankle flexion, extension on right but 4+/5 on left  -Tone: normal    SENSORY:  -Light touch, temperature sensation intact bilaterally    REFLEXES:  -2+ upper and lower bilaterally  -Flexor plantar reflex bilaterally  -No clonus    COORDINATION:  -FNF, HKS intact bilaterally    GAIT:  -Normal casual gait     Imaging  MRI Brain: 10/20/2018  FINDINGS:  Intracranial compartment:    Ventricles and sulci are normal in size for age without evidence of hydrocephalus. No extra-axial blood or fluid collections.    There is diffusion restriction within the white matter adjacent to the body of the right lateral ventricle, most compatible with acute infarction.  There is encephalomalacia with surrounding T2/FLAIR hyperintensity in this region and extending laterally throughout the right MCA territory, compatible with this patient's prior right MCA " infarction.  There is residual susceptibility weighted artifact within this region of infarction, improved since 06/10/2018 and likely correlating with hemosiderin deposition or calcium.  No new hemorrhage..  There are scattered small foci of T2 hyperintensity within the supratentorial white matter compatible with chronic microvascular ischemic change.  No significant edema or mass effect.    Normal vascular flow voids are preserved.    Skull/extracranial contents (limited evaluation): Bone marrow signal intensity is normal.      Impression       Small acute lacunar type infarction in the right corona radiata, adjacent to the old right MCA vascular territory infarction.  No significant mass effect or acute intracranial hemorrhage.         Assessment/Plan:  1. Seizure likely secondary to previous ischemic stroke.  2. Right MCA ischemic stroke  3. Atrial Fibrillation, continue eliquis    Plan:  - Continue Keppra 500mg bid  - Continue eliquis and plavix for secondary stroke prevention  - Patient to followup with PCP for management of stroke risk factors.  - Seizure precautions were discussed with the patient including no driving. Risk of SUDEP was discussed with the patient.    Restrictions for uncontrolled seizures/epilepsy were provided to patient  Individual is to not engage in the following activities:  Driving  Climbing  Operating dangerous equipment  Cooking  Swimming alone  Scuba Diving  Joseph Diving  Hang Gliding  handling firearms   Handling knives or other sharp objects  Other activities in which a lapse of awareness could lead to injury   to the individual or others.     Patient can followup as needed.    I discussed the assessment and plan with the patient and her family.

## 2018-11-21 ENCOUNTER — HOME CARE VISIT (OUTPATIENT)
Dept: NEUROLOGY | Facility: HOSPITAL | Age: 83
End: 2018-11-21

## 2018-11-26 NOTE — PROGRESS NOTES
Patient with recent hospital stay for seizure, MRI showing acute stroke, and UTI. SM nurse reinforced education on stroke RF and compliance with medical treatment plan.

## 2018-12-17 ENCOUNTER — HOME CARE VISIT (OUTPATIENT)
Dept: NEUROLOGY | Facility: HOSPITAL | Age: 83
End: 2018-12-17

## 2018-12-20 VITALS
WEIGHT: 163.38 LBS | SYSTOLIC BLOOD PRESSURE: 126 MMHG | OXYGEN SATURATION: 97 % | DIASTOLIC BLOOD PRESSURE: 80 MMHG | BODY MASS INDEX: 29.89 KG/M2 | HEART RATE: 57 BPM

## 2018-12-20 NOTE — PROGRESS NOTES
Mrs. Sewell denies any ER visits or hospital stays. Patients physical mobility has declined since last SM visit.  nurse contacted her PCP and requested outpatient therapy with patient and caregivers permission. LHE reviewed stroke S/S and how it is a emergent situation. 2nd stroke occurred on 10/20/18 [facility].

## 2018-12-28 DIAGNOSIS — Z12.39 SCREENING BREAST EXAMINATION: Primary | ICD-10-CM

## 2019-01-12 NOTE — PLAN OF CARE
Problem: SLP Goal  Goal: SLP Goal  Speech Language Pathology Goals  Goals expected to be met by 9/11/2018  1. Pt will tolerate regular diet with thin liquids w/o overt S/S aspiration   2. Educate Pt and family on S/S aspiration and aspiration precautions    Outcome: Ongoing (interventions implemented as appropriate)  Bedside Swallow Study completed. Patient presents with Mild Oral dysphagia as characterized by mildly prolonged yet functional preporation of solid boluses. No overt S/S aspiration at the bedside. REC: Regular diet with thin liquids, medications one at time, no straws, monitor for L pocketing and strict aspiration precautions. ST to f/u to monitor tolerance. MD team paged to review recommendations following assessment. Findings reviewed with Pt, Spouse and Daughter. No barriers noted and Pt agreeable to POC. Please see report for full details. Thank you.    JOHN Pedroza., [facility]-SLP  Speech-Language Pathology  Pager: [phone]  9/4/2018         yes

## 2019-01-18 ENCOUNTER — HOME CARE VISIT (OUTPATIENT)
Dept: NEUROLOGY | Facility: HOSPITAL | Age: 84
End: 2019-01-18

## 2019-01-27 VITALS
OXYGEN SATURATION: 97 % | DIASTOLIC BLOOD PRESSURE: 64 MMHG | BODY MASS INDEX: 29.34 KG/M2 | SYSTOLIC BLOOD PRESSURE: 130 MMHG | WEIGHT: 160.38 LBS | HEART RATE: 60 BPM

## 2019-01-27 NOTE — PROGRESS NOTES
Ms. Sewell VS are WNL on today's visit. She denies any ER visits or hospital stays. Speech and OT therapy evaluations completed. LHE reviewed hydration requirements and healthy bladder habits.

## 2019-02-19 ENCOUNTER — HOME CARE VISIT (OUTPATIENT)
Dept: NEUROLOGY | Facility: HOSPITAL | Age: 84
End: 2019-02-19

## 2019-03-19 ENCOUNTER — HOME CARE VISIT (OUTPATIENT)
Dept: NEUROLOGY | Facility: HOSPITAL | Age: 84
End: 2019-03-19

## 2019-03-21 VITALS
OXYGEN SATURATION: 97 % | HEART RATE: 56 BPM | DIASTOLIC BLOOD PRESSURE: 70 MMHG | WEIGHT: 159 LBS | SYSTOLIC BLOOD PRESSURE: 126 MMHG | BODY MASS INDEX: 29.08 KG/M2

## 2019-03-21 NOTE — PROGRESS NOTES
Mrs Sewell VS are WNL on today's visit. She denies any ER visits or hospital stays. Outpatient therapy completed. Follow up with PCP on today. Patient reports sleeping and eating well. Looking forward vacation in May. LHE educated patient and caregiver om medication compliance and preparation for travel.

## 2019-04-22 ENCOUNTER — HOME CARE VISIT (OUTPATIENT)
Dept: NEUROLOGY | Facility: HOSPITAL | Age: 84
End: 2019-04-22

## 2019-04-22 VITALS
OXYGEN SATURATION: 97 % | DIASTOLIC BLOOD PRESSURE: 68 MMHG | BODY MASS INDEX: 29.26 KG/M2 | HEART RATE: 57 BPM | SYSTOLIC BLOOD PRESSURE: 120 MMHG | WEIGHT: 160 LBS

## 2019-04-22 NOTE — PROGRESS NOTES
Mrs. Sewell VS are WNL on today's visit. She denies any ER visits or hospital stays. LHE reinforced medication compliance when she is out of town.

## 2019-05-21 ENCOUNTER — HOME CARE VISIT (OUTPATIENT)
Dept: NEUROLOGY | Facility: HOSPITAL | Age: 84
End: 2019-05-21

## 2019-05-27 VITALS
WEIGHT: 159 LBS | SYSTOLIC BLOOD PRESSURE: 120 MMHG | OXYGEN SATURATION: 96 % | HEART RATE: 57 BPM | BODY MASS INDEX: 29.08 KG/M2 | DIASTOLIC BLOOD PRESSURE: 60 MMHG

## 2019-05-27 NOTE — PROGRESS NOTES
Mrs. Sewell VS are WNL on today's visit. She denies any ER visits or hospital stays. Patient has returned from vacation 2 weeks ago and enjoyed her time away. LHE reinforced ending of Stroke mobile program next month and reviewed present stroke RF.

## 2019-06-21 ENCOUNTER — HOME CARE VISIT (OUTPATIENT)
Dept: NEUROLOGY | Facility: HOSPITAL | Age: 84
End: 2019-06-21

## 2019-06-25 NOTE — PROGRESS NOTES
Mrs. Sewell has completed SM program. SM nurse reinforced education of stroke S/S, stroke RF, and continued behavioral modifications needed for a reduction of stroke reoccurrence.

## 2019-09-16 RX ORDER — ATORVASTATIN CALCIUM 40 MG/1
40 TABLET, FILM COATED ORAL DAILY
Qty: 90 TABLET | Refills: 0 | Status: SHIPPED | OUTPATIENT
Start: 2019-09-16 | End: 2019-11-18 | Stop reason: SDUPTHER

## 2019-09-16 RX ORDER — CARVEDILOL 6.25 MG/1
6.25 TABLET ORAL 2 TIMES DAILY
Qty: 180 TABLET | Refills: 0 | Status: SHIPPED | OUTPATIENT
Start: 2019-09-16 | End: 2019-11-18 | Stop reason: SDUPTHER

## 2019-10-07 ENCOUNTER — TELEPHONE (OUTPATIENT)
Dept: FAMILY MEDICINE | Facility: CLINIC | Age: 84
End: 2019-10-07

## 2019-10-07 NOTE — TELEPHONE ENCOUNTER
----- Message from Aide Turpin sent at 10/7/2019  4:54 PM CDT -----  Contact: [phone] self   Pt is requesting lab orders for upcoming appt. Please advise

## 2019-10-15 ENCOUNTER — OFFICE VISIT (OUTPATIENT)
Dept: FAMILY MEDICINE | Facility: CLINIC | Age: 84
End: 2019-10-15
Payer: MEDICARE

## 2019-10-15 VITALS
DIASTOLIC BLOOD PRESSURE: 70 MMHG | SYSTOLIC BLOOD PRESSURE: 120 MMHG | HEART RATE: 44 BPM | OXYGEN SATURATION: 96 % | WEIGHT: 160.25 LBS | BODY MASS INDEX: 29.49 KG/M2 | TEMPERATURE: 98 F | HEIGHT: 62 IN

## 2019-10-15 DIAGNOSIS — Z86.73 HX OF ISCHEMIC RIGHT MCA STROKE: ICD-10-CM

## 2019-10-15 DIAGNOSIS — E78.2 MIXED HYPERLIPIDEMIA: ICD-10-CM

## 2019-10-15 DIAGNOSIS — I10 BENIGN ESSENTIAL HTN: Primary | ICD-10-CM

## 2019-10-15 DIAGNOSIS — R56.9 SEIZURE: ICD-10-CM

## 2019-10-15 DIAGNOSIS — I48.0 PAROXYSMAL ATRIAL FIBRILLATION: ICD-10-CM

## 2019-10-15 DIAGNOSIS — Z23 NEED FOR PROPHYLACTIC VACCINATION AND INOCULATION AGAINST INFLUENZA: ICD-10-CM

## 2019-10-15 DIAGNOSIS — Z86.718 HISTORY OF DVT (DEEP VEIN THROMBOSIS): ICD-10-CM

## 2019-10-15 PROBLEM — I63.311 THROMBOTIC STROKE INVOLVING RIGHT MIDDLE CEREBRAL ARTERY: Status: RESOLVED | Noted: 2018-10-20 | Resolved: 2019-10-15

## 2019-10-15 PROBLEM — I26.92 CHRONIC SADDLE PULMONARY EMBOLISM WITHOUT ACUTE COR PULMONALE: Status: RESOLVED | Noted: 2018-09-03 | Resolved: 2019-10-15

## 2019-10-15 PROBLEM — Z74.09 IMPAIRED FUNCTIONAL MOBILITY, BALANCE, GAIT, AND ENDURANCE: Status: RESOLVED | Noted: 2018-08-07 | Resolved: 2019-10-15

## 2019-10-15 PROBLEM — R53.1 LEFT-SIDED WEAKNESS: Status: RESOLVED | Noted: 2018-08-07 | Resolved: 2019-10-15

## 2019-10-15 PROBLEM — E87.6 HYPOKALEMIA: Status: RESOLVED | Noted: 2018-10-19 | Resolved: 2019-10-15

## 2019-10-15 PROBLEM — I27.82 CHRONIC SADDLE PULMONARY EMBOLISM WITHOUT ACUTE COR PULMONALE: Status: RESOLVED | Noted: 2018-09-03 | Resolved: 2019-10-15

## 2019-10-15 PROBLEM — N30.00 ACUTE CYSTITIS WITHOUT HEMATURIA: Status: RESOLVED | Noted: 2018-10-19 | Resolved: 2019-10-15

## 2019-10-15 PROBLEM — I63.411 EMBOLIC STROKE INVOLVING RIGHT MIDDLE CEREBRAL ARTERY: Status: RESOLVED | Noted: 2018-06-09 | Resolved: 2019-10-15

## 2019-10-15 PROBLEM — R13.10 DYSPHAGIA: Status: RESOLVED | Noted: 2018-06-12 | Resolved: 2019-10-15

## 2019-10-15 PROCEDURE — 99999 PR PBB SHADOW E&M-EST. PATIENT-LVL III: CPT | Mod: PBBFAC,,, | Performed by: INTERNAL MEDICINE

## 2019-10-15 PROCEDURE — 90662 IIV NO PRSV INCREASED AG IM: CPT | Mod: PBBFAC,PN

## 2019-10-15 PROCEDURE — 99214 PR OFFICE/OUTPT VISIT, EST, LEVL IV, 30-39 MIN: ICD-10-PCS | Mod: S$PBB,,, | Performed by: INTERNAL MEDICINE

## 2019-10-15 PROCEDURE — 99214 OFFICE O/P EST MOD 30 MIN: CPT | Mod: S$PBB,,, | Performed by: INTERNAL MEDICINE

## 2019-10-15 PROCEDURE — 99213 OFFICE O/P EST LOW 20 MIN: CPT | Mod: PBBFAC,PN,25 | Performed by: INTERNAL MEDICINE

## 2019-10-15 PROCEDURE — 99999 PR PBB SHADOW E&M-EST. PATIENT-LVL III: ICD-10-PCS | Mod: PBBFAC,,, | Performed by: INTERNAL MEDICINE

## 2019-10-15 RX ORDER — HYDROXYZINE HYDROCHLORIDE 25 MG/1
25 TABLET, FILM COATED ORAL 3 TIMES DAILY PRN
Qty: 90 TABLET | Refills: 0 | Status: SHIPPED | OUTPATIENT
Start: 2019-10-15 | End: 2022-09-19

## 2019-10-15 RX ORDER — OMEPRAZOLE 20 MG/1
TABLET, DELAYED RELEASE ORAL
COMMUNITY
Start: 2013-02-04 | End: 2019-11-18 | Stop reason: SDUPTHER

## 2019-10-15 NOTE — PROGRESS NOTES
[facility] Note    Chief Complaint      Chief Complaint   Patient presents with    Hypertension     6 months    Rash     itching       History of Present Illness      Isa Sewell is a 88 y.o. female with chronic conditions of HTN, HLD, a fib, hx of stroke, hx of dvt who presents today for: re-establish care from  and review chronic conditions.  Complains of persistent chronic pruritic rash.  Has seen multiple dermatologists, most recently Sonu Valdez.  Has clobetasol and aloe vera which incompletely controls.    A fib: Sees Dr. Chau.  On eliquis for anticoagulation.  On coreg for rate control.  Denies chest pain, shortness of breath, palpitations.    HTN: BP at goal on amlodipine, coreg, benicar  HLD: controlled with lipitor.  LDL due.    Hx of stroke: On eliquis and plavix for secondary prevention.  No new deficits.  Hx of DVT: Sees Dr. Koobehi.  On eliquis for anticoagulations.    Epilepsy: Sees Dr. Mata, neurology.  On keppra.  No recent seizure activity      Past Medical History:  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/2016    Seizure 10/19/2018       Past Surgical History:   has a past surgical history that includes Hysterectomy; Tonsillectomy; Cholecystectomy; Fracture surgery (Right); and Breast biopsy (Bilateral).    Family History:  family history is not on file.     Social History:  Social History     Tobacco Use    Smoking status: Never Smoker    Smokeless tobacco: Never Used   Substance Use Topics    Alcohol use: No    Drug use: No       Review of Systems   Constitutional: Negative for chills, fever and malaise/fatigue.   Respiratory: Negative for shortness of breath.    Cardiovascular: Negative for chest pain.   Gastrointestinal: Positive for  constipation. Negative for diarrhea, nausea and vomiting.   Skin: Negative for rash.   Neurological: Negative for weakness.        Medications:  Outpatient Encounter Medications as of 10/15/2019   Medication Sig Dispense Refill    amlodipine (NORVASC) 5 MG tablet Take 5 mg by mouth once daily.      apixaban (ELIQUIS) 5 mg (74 tabs) DsPk For the first 7 days take two 5 mg tablets twice daily.  After 7 days take one 5 mg tablet twice daily. 30 tablet 3    atorvastatin (LIPITOR) 40 MG tablet Take 1 tablet (40 mg total) by mouth once daily. 90 tablet 0    carvedilol (COREG) 6.25 MG tablet Take 1 tablet (6.25 mg total) by mouth 2 (two) times daily. 180 tablet 0    cetirizine (ZYRTEC) 10 MG tablet Take 10 mg by mouth once daily.      clobetasol 0.05% (TEMOVATE) 0.05 % Oint APPLY TO AFFECTED AREA TWICE DAILY. AVOID FACE, ARMPITS, AND GROIN. 60 g 2    clopidogrel (PLAVIX) 75 mg tablet Take 1 tablet (75 mg total) by mouth once daily. 30 tablet 11    levETIRAcetam (KEPPRA) 500 MG Tab Take 1 tablet (500 mg total) by mouth 2 (two) times daily. 180 tablet 3    melatonin 3 mg Tab Take 1 tablet (3 mg total) by mouth every evening. 90 tablet 3    olmesartan (BENICAR) 40 MG tablet Take 1 tablet (40 mg total) by mouth once daily. 90 tablet 3    omeprazole (PRILOSEC OTC) 20 MG tablet Take by mouth.      phenazopyridine (PYRIDIUM) 200 MG tablet Take 200 mg by mouth 3 (three) times daily as needed for Pain.      triamcinolone acetonide 0.1% (KENALOG) 0.1 % ointment   3    [DISCONTINUED] amoxicillin-clavulanate 500-125mg (AUGMENTIN) 500-125 mg Tab Take 1 tablet (500 mg total) by mouth 2 (two) times daily. (Patient not taking: Reported on 10/15/2019) 8 tablet 0     No facility-administered encounter medications on file as of 10/15/2019.        Allergies:  Review of patient's allergies indicates:   Allergen Reactions    Sulfa (sulfonamide antibiotics) Hives       Health Maintenance:  There is no immunization history for the  "selected administration types on file for this patient.   Health Maintenance   Topic Date Due    TETANUS VACCINE  06/11/1949    Pneumococcal Vaccine (65+ Low/Medium Risk) (1 of 2 - PCV13) 06/11/1996    Lipid Panel  03/21/2024      Flu shot today.  Willg et other vaccine records from [facility].  Mammograms and cscopes n/A 2/2 age.    Physical Exam      Vital Signs  Temp: 97.7 °F (36.5 °C)  Temp src: Oral  Pulse: (!) 44  SpO2: 96 %  BP: 120/70  BP Location: Left arm  Patient Position: Sitting  Height and Weight  Height: 5' 2" (157.5 cm)  Weight: 72.7 kg (160 lb 4.4 oz)  BSA (Calculated - sq m): 1.78 sq meters  BMI (Calculated): 29.4  Weight in (lb) to have BMI = 25: 136.4]    Physical Exam   Constitutional: She appears well-developed and well-nourished.   HENT:   Head: Normocephalic and atraumatic.   Right Ear: External ear normal.   Left Ear: External ear normal.   Mouth/Throat: Oropharynx is clear and moist.   Eyes: Pupils are equal, round, and reactive to light. Conjunctivae and EOM are normal.   Neck: Carotid bruit is not present.   Cardiovascular: Normal rate, regular rhythm, normal heart sounds and intact distal pulses.   No murmur heard.  Pulmonary/Chest: Effort normal and breath sounds normal. She has no wheezes. She has no rales.   Abdominal: Soft. Bowel sounds are normal. She exhibits no distension. There is no hepatosplenomegaly. There is no tenderness.   Musculoskeletal: She exhibits no edema.   Skin:   Multiple areas of excoriation   Vitals reviewed.       Laboratory:  CBC:  Recent Labs   Lab 10/20/18  0351 10/21/18  0354 10/22/18  0312   WBC 5.61 5.78 6.30   RBC 3.88 L 4.19 4.17   Hemoglobin 10.8 L 11.7 L 11.6 L   Hematocrit 35.0 L 37.9 37.4   Platelets 203 212 217   Mean Corpuscular Volume 90 91 90   Mean Corpuscular Hemoglobin 27.8 27.9 27.8   Mean Corpuscular Hemoglobin Conc 30.9 L 30.9 L 31.0 L     CMP:  Recent Labs   Lab 10/21/18  0354 10/22/18  0312 03/21/19  0734   Glucose 108 111 H 121 H   Calcium " 9.7 9.5 9.7   Albumin 3.2 L 3.1 L 4.2   Total Protein 6.1 6.0 7.1   Sodium 145 143 148 H   Potassium 3.9 4.1 4.4   CO2 24 22 L 26   Chloride 112 H 110 111 H   BUN, Bld 17 17 24 H   Alkaline Phosphatase 84 84 91   ALT 16 14 19   AST 18 18 23   Total Bilirubin 0.3 0.3 0.4     URINALYSIS:  Recent Labs   Lab 10/19/18  1647   Color, UA Yellow   Specific Gravity, UA 1.020   pH, UA 5.0   Protein, UA 1+ A   Bacteria Moderate A   Nitrite, UA Negative   Leukocytes, UA 3+ A   Urobilinogen, UA Negative   Hyaline Casts, UA 5 A      LIPIDS:  Recent Labs   Lab 06/09/18  1208 06/09/18  1712 03/21/19  0734   TSH 3.210 1.507  --    HDL 46 36 L 37 L   Cholesterol 217 H 159 137   Triglycerides 113 68 110   LDL Cholesterol 148.4 109.4 78.0   Hdl/Cholesterol Ratio 21.2 22.6 27.0   Non-HDL Cholesterol 171 123 100   Total Cholesterol/HDL Ratio 4.7 4.4 3.7     TSH:  Recent Labs   Lab 06/09/18  1208 06/09/18  1712   TSH 3.210 1.507     A1C:  Recent Labs   Lab 06/09/18  1712 03/21/19  0734   Hemoglobin A1C 6.2 H 6.0 H       Assessment/Plan     Isa Sewell is a 88 y.o.female with:    1. Benign essential HTN  - CBC auto differential; Future  - Comprehensive metabolic panel; Future  - Lipid panel; Future  Continue current meds.    2. Mixed hyperlipidemia  - CBC auto differential; Future  - Comprehensive metabolic panel; Future  - Lipid panel; Future  Continue current meds.    3. Paroxysmal atrial fibrillation  - CBC auto differential; Future  - Comprehensive metabolic panel; Future  - Lipid panel; Future  Continue current meds.    4. Hx of ischemic right MCA stroke  - CBC auto differential; Future  - Comprehensive metabolic panel; Future  - Lipid panel; Future  Continue current meds.    5. History of DVT (deep vein thrombosis)  - CBC auto differential; Future  - Comprehensive metabolic panel; Future  - Lipid panel; Future  Continue current meds.    6. Seizure  Continue current meds.  F/U with specialist as scheduled    Chronic conditions  status updated as per HPI.  Other than changes above, cont current medications and maintain follow up with specialists.  Return to clinic in 6 months.    Dariel Obregon MD  [facility]                Flu consent signed by patient. Flu vaccine administered.

## 2019-10-16 RX ORDER — CLOBETASOL PROPIONATE 0.5 MG/G
OINTMENT TOPICAL
Qty: 60 G | Refills: 3 | Status: SHIPPED | OUTPATIENT
Start: 2019-10-16 | End: 2020-07-14 | Stop reason: SDUPTHER

## 2019-10-24 RX ORDER — LEVETIRACETAM 500 MG/1
500 TABLET ORAL 2 TIMES DAILY
Qty: 180 TABLET | Refills: 3 | Status: SHIPPED | OUTPATIENT
Start: 2019-10-24 | End: 2019-10-24 | Stop reason: SDUPTHER

## 2019-10-24 NOTE — TELEPHONE ENCOUNTER
----- Message from Priyanka Shea sent at 10/24/2019  1:00 PM CDT -----  Contact: Self [phone]  Patient is calling to get refills on her medication sent to  [id number] - [place] - [address] AT [place] [phone] (Phone)  [phone] (Fax)       1. levETIRAcetam (KEPPRA) 500 MG Tab 180 tablet

## 2019-10-25 RX ORDER — LEVETIRACETAM 500 MG/1
500 TABLET ORAL 2 TIMES DAILY
Qty: 180 TABLET | Refills: 3 | Status: SHIPPED | OUTPATIENT
Start: 2019-10-25 | End: 2019-11-18 | Stop reason: SDUPTHER

## 2019-11-13 RX ORDER — CLOPIDOGREL BISULFATE 75 MG/1
75 TABLET ORAL DAILY
Qty: 30 TABLET | Refills: 11 | Status: SHIPPED | OUTPATIENT
Start: 2019-11-13 | End: 2019-11-18 | Stop reason: SDUPTHER

## 2019-11-13 NOTE — TELEPHONE ENCOUNTER
Patient saw you on the October 15 her plavix was sent to the wrong pharmacy. Patient daughter states they use [facility] [place] and would like you to sent the prescription there and wanted to know if they could get a 90day supply.

## 2019-11-13 NOTE — TELEPHONE ENCOUNTER
----- Message from America Sharma sent at 11/13/2019  7:32 AM CST -----  Contact: Daughter cem [phone]  Needs a call back about medication. Please advise

## 2019-11-18 ENCOUNTER — TELEPHONE (OUTPATIENT)
Dept: FAMILY MEDICINE | Facility: CLINIC | Age: 84
End: 2019-11-18

## 2019-11-18 RX ORDER — OMEPRAZOLE 20 MG/1
20 TABLET, DELAYED RELEASE ORAL EVERY MORNING
Qty: 90 TABLET | Refills: 3 | COMMUNITY
Start: 2019-11-18 | End: 2019-11-19 | Stop reason: SDUPTHER

## 2019-11-18 RX ORDER — CARVEDILOL 6.25 MG/1
6.25 TABLET ORAL 2 TIMES DAILY
Qty: 180 TABLET | Refills: 3 | Status: SHIPPED | OUTPATIENT
Start: 2019-11-18 | End: 2020-10-20 | Stop reason: SDUPTHER

## 2019-11-18 RX ORDER — ATORVASTATIN CALCIUM 40 MG/1
40 TABLET, FILM COATED ORAL DAILY
Qty: 90 TABLET | Refills: 3 | Status: SHIPPED | OUTPATIENT
Start: 2019-11-18 | End: 2020-10-20 | Stop reason: SDUPTHER

## 2019-11-18 RX ORDER — AMLODIPINE BESYLATE 5 MG/1
5 TABLET ORAL DAILY
Qty: 90 TABLET | Refills: 3 | Status: SHIPPED | OUTPATIENT
Start: 2019-11-18 | End: 2020-10-20 | Stop reason: SDUPTHER

## 2019-11-18 RX ORDER — LEVETIRACETAM 500 MG/1
500 TABLET ORAL 2 TIMES DAILY
Qty: 180 TABLET | Refills: 3 | Status: SHIPPED | OUTPATIENT
Start: 2019-11-18 | End: 2020-10-20 | Stop reason: SDUPTHER

## 2019-11-18 RX ORDER — CLOPIDOGREL BISULFATE 75 MG/1
75 TABLET ORAL DAILY
Qty: 90 TABLET | Refills: 3 | Status: SHIPPED | OUTPATIENT
Start: 2019-11-18 | End: 2020-10-20 | Stop reason: SDUPTHER

## 2019-11-18 RX ORDER — OLMESARTAN MEDOXOMIL 40 MG/1
40 TABLET ORAL DAILY
Qty: 90 TABLET | Refills: 3 | Status: SHIPPED | OUTPATIENT
Start: 2019-11-18 | End: 2020-10-20 | Stop reason: SDUPTHER

## 2019-11-18 NOTE — TELEPHONE ENCOUNTER
----- Message from Li Villalobos sent at 11/18/2019 10:32 AM CST -----  Contact: [phone]/daughter/Melanie  Daughter is requesting a call back. She needs to discuss her rx to set up [facility]. Thanks

## 2019-11-19 RX ORDER — OMEPRAZOLE 20 MG/1
20 TABLET, DELAYED RELEASE ORAL EVERY MORNING
Qty: 90 TABLET | Refills: 3 | Status: SHIPPED | OUTPATIENT
Start: 2019-11-19 | End: 2020-10-20 | Stop reason: SDUPTHER

## 2019-11-21 ENCOUNTER — TELEPHONE (OUTPATIENT)
Dept: FAMILY MEDICINE | Facility: CLINIC | Age: 84
End: 2019-11-21

## 2019-11-21 NOTE — TELEPHONE ENCOUNTER
----- Message from Li Villalobos sent at 11/21/2019  1:03 PM CST -----  Contact: daughter/Bianca/[phone]  Patient's daughter is requesting a call back regarding sending her medication to [facility]. Thanks

## 2020-03-02 ENCOUNTER — TELEPHONE (OUTPATIENT)
Dept: FAMILY MEDICINE | Facility: CLINIC | Age: 85
End: 2020-03-02

## 2020-03-02 DIAGNOSIS — M1A.9XX0 CHRONIC GOUT WITHOUT TOPHUS, UNSPECIFIED CAUSE, UNSPECIFIED SITE: Primary | ICD-10-CM

## 2020-03-02 RX ORDER — COLCHICINE 0.6 MG/1
TABLET ORAL
Qty: 9 TABLET | Refills: 0 | Status: ON HOLD | OUTPATIENT
Start: 2020-03-02 | End: 2021-04-12 | Stop reason: HOSPADM

## 2020-03-02 NOTE — TELEPHONE ENCOUNTER
----- Message from Deanne Mcdonnell sent at 3/2/2020 12:50 PM CST -----  Contact: Ms Bianca Sullivan/   Daughter [phone]  Type:  New RX Refill Request    Who Called: patient requesting prescription for new medication for Gout  Refill or New Rx:  New   RX Name and Strength:  How is the patient currently taking it? (ex. 1XDay):  Is this a 30 day or 90 day RX  Preferred Pharmacy with phone number:  [facility] in [place] on [address] [facility] [address]    Local or Mail Order:  Ordering Provider: Dr Obregon  Would the patient rather a call back or a response via [facility]? Call  Best Call Back Number: [phone]  Additional Information:

## 2020-04-14 ENCOUNTER — TELEPHONE (OUTPATIENT)
Dept: FAMILY MEDICINE | Facility: CLINIC | Age: 85
End: 2020-04-14

## 2020-04-14 ENCOUNTER — OFFICE VISIT (OUTPATIENT)
Dept: FAMILY MEDICINE | Facility: CLINIC | Age: 85
End: 2020-04-14
Payer: MEDICARE

## 2020-04-14 DIAGNOSIS — H02.9 EYELID LESION: Primary | ICD-10-CM

## 2020-04-14 PROCEDURE — 99442 PR PHYSICIAN TELEPHONE EVALUATION 11-20 MIN: ICD-10-PCS | Mod: 95,,, | Performed by: INTERNAL MEDICINE

## 2020-04-14 PROCEDURE — 99442 PR PHYSICIAN TELEPHONE EVALUATION 11-20 MIN: CPT | Mod: 95,,, | Performed by: INTERNAL MEDICINE

## 2020-04-14 RX ORDER — ERYTHROMYCIN 5 MG/G
OINTMENT OPHTHALMIC
Qty: 1 G | Refills: 1 | Status: SHIPPED | OUTPATIENT
Start: 2020-04-14 | End: 2022-09-19

## 2020-04-14 NOTE — TELEPHONE ENCOUNTER
----- Message from Argelia Lindsey sent at 4/14/2020  8:36 AM CDT -----  Contact: Bianca Sullivan (daughter)  Type:  Needs Medical Advice    Who Called: Bianca  Symptoms (please be specific):  Eye lid draining pus   How long has patient had these symptoms:  3 days now  Pharmacy [facility] and phone #:  [id number] [place] - [address] AT [address] [facility]  Would the patient rather a call back or a response via [facility]?  Call back  Best Call Back Number:  [phone]  Additional Information:  She really needs to speak with your office as she does not want this to get out of hand

## 2020-04-14 NOTE — TELEPHONE ENCOUNTER
Daughter stated that pt has a stye on the bottom of eye that is red and oozing pus. Daughter stated that the stye appeared on Sunday. Offered Virtual Visit, daughter stated that she does not live with mother and would have to get with her. Daughter requesting that a medication be sent in for mother before stye gets out of control. Please advise.

## 2020-04-14 NOTE — PROGRESS NOTES
[facility] Audio Only Virtual Visit [facility]    The patient location is: home  The chief complaint leading to consultation is: bump on eye  Visit type: Virtual visit with audio only  Total time spent with patient: 15 min  Each patient to whom he or she provides medical services by telemedicine is:  (1) informed of the relationship between the physician and patient and the respective role of any other health care provider with respect to management of the patient; and (2) notified that he or she may decline to receive medical services by telemedicine and may withdraw from such care at any time.      Chief Complaint      Chief Complaint   Patient presents with    Eye Problem       History of Present Illness      Isa Sewell is a 88 y.o. female with chronic conditions of HTN, HLD, A fib, hx of stroke, hx of DVT who presents today for: bump on right lower lid.  Symptoms started 2 days ago with hard lump on lower lid.  Pt scratched it and had some purulent material drain from it.  Now, no longer draining but is more red.  Not painful.  Pt has not been applying warm compresses.  No other complaints.    Past Medical History:  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/2016    Seizure 10/19/2018       Past Surgical History:   has a past surgical history that includes Hysterectomy; Tonsillectomy; Cholecystectomy; Fracture surgery (Right); and Breast biopsy (Bilateral).    Family History:  family history is not on file.     Social History:  Social History     Tobacco Use    Smoking status: Never Smoker    Smokeless tobacco: Never Used   Substance Use Topics    Alcohol use: No    Drug use: No       Review of Systems   Constitutional: Negative for chills, fever and malaise/fatigue.   Eyes:        Lump  in lower lid of right eye   Respiratory: Negative for shortness of breath.    Cardiovascular: Negative for chest pain.   Gastrointestinal: Negative for constipation, diarrhea, nausea and vomiting.   Skin: Negative for rash.   Neurological: Negative for weakness.        Medications:  Outpatient Encounter Medications as of 4/14/2020   Medication Sig Dispense Refill    amLODIPine (NORVASC) 5 MG tablet Take 1 tablet (5 mg total) by mouth once daily. 90 tablet 3    apixaban (ELIQUIS) 5 mg (74 tabs) DsPk For the first 7 days take two 5 mg tablets twice daily.  After 7 days take one 5 mg tablet twice daily. 30 tablet 3    apixaban (ELIQUIS) 5 mg Tab Take 1 tablet (5 mg total) by mouth 2 (two) times daily. 180 tablet 3    atorvastatin (LIPITOR) 40 MG tablet Take 1 tablet (40 mg total) by mouth once daily. 90 tablet 3    carvedilol (COREG) 6.25 MG tablet Take 1 tablet (6.25 mg total) by mouth 2 (two) times daily. 180 tablet 3    cetirizine (ZYRTEC) 10 MG tablet Take 10 mg by mouth once daily.      clobetasol 0.05% (TEMOVATE) 0.05 % Oint APPLY TO AFFECTED AREA TWICE A DAY 60 g 3    clopidogrel (PLAVIX) 75 mg tablet Take 1 tablet (75 mg total) by mouth once daily. 90 tablet 3    colchicine (COLCRYS) 0.6 mg tablet Take 2 tablets by mouth once and then 1 tablet by mouth one hour later, then one tablet daily until resolved 9 tablet 0    erythromycin (ROMYCIN) ophthalmic ointment Place into affected eye 3x daily until resolved 1 g 1    hydrOXYzine HCl (ATARAX) 25 MG tablet Take 1 tablet (25 mg total) by mouth 3 (three) times daily as needed for Itching. 90 tablet 0    levETIRAcetam (KEPPRA) 500 MG Tab Take 1 tablet (500 mg total) by mouth 2 (two) times daily. 180 tablet 3    melatonin 3 mg Tab Take 1 tablet (3 mg total) by mouth every evening. 90 tablet 3    olmesartan (BENICAR) 40 MG tablet Take 1 tablet (40 mg total) by mouth once daily. 90 tablet 3    omeprazole (PRILOSEC OTC) 20 MG tablet Take 1 tablet (20 mg  total) by mouth every morning. 90 tablet 3    phenazopyridine (PYRIDIUM) 200 MG tablet Take 200 mg by mouth 3 (three) times daily as needed for Pain.      triamcinolone acetonide 0.1% (KENALOG) 0.1 % ointment   3     No facility-administered encounter medications on file as of 4/14/2020.        Allergies:  Review of patient's allergies indicates:   Allergen Reactions    Sulfa (sulfonamide antibiotics) Hives       Health Maintenance:  Immunization History   Administered Date(s) Administered    Influenza - High Dose - PF (65 years and older) 10/15/2019      Health Maintenance   Topic Date Due    TETANUS VACCINE  06/11/1949    Pneumococcal Vaccine (65+ Low/Medium Risk) (2 of 2 - PPSV23) 10/12/2019    Lipid Panel  10/18/2024        Physical Exam      Audio only visit    Laboratory:  CBC:  Recent Labs   Lab 10/21/18  0354 10/22/18  0312 10/18/19  0819   WBC 5.78 6.30 6.97   RBC 4.19 4.17 4.56   Hemoglobin 11.7 L 11.6 L 13.6   Hematocrit 37.9 37.4 42.4   Platelets 212 217 237   Mean Corpuscular Volume 91 90 93   Mean Corpuscular Hemoglobin 27.9 27.8 29.8   Mean Corpuscular Hemoglobin Conc 30.9 L 31.0 L 32.1     CMP:  Recent Labs   Lab 10/22/18  0312 03/21/19  0734 10/18/19  0819   Glucose 111 H 121 H 102   Calcium 9.5 9.7 9.5   Albumin 3.1 L 4.2 4.1   Total Protein 6.0 7.1 7.3   Sodium 143 148 H 144   Potassium 4.1 4.4 3.8   CO2 22 L 26 23   Chloride 110 111 H 112 H   BUN, Bld 17 24 H 18 H   Alkaline Phosphatase 84 91 115   ALT 14 19 18   AST 18 23 24   Total Bilirubin 0.3 0.4 0.5     URINALYSIS:  Recent Labs   Lab 10/19/18  1647   Color, UA Yellow   Specific Gravity, UA 1.020   pH, UA 5.0   Protein, UA 1+ A   Bacteria Moderate A   Nitrite, UA Negative   Leukocytes, UA 3+ A   Urobilinogen, UA Negative   Hyaline Casts, UA 5 A      LIPIDS:  Recent Labs   Lab 06/09/18  1208 06/09/18  1712 03/21/19  0734 10/18/19  0819   TSH 3.210 1.507  --   --    HDL 46 36 L 37 L 39 L   Cholesterol 217 H 159 137 136   Triglycerides  113 68 110 117   LDL Cholesterol 148.4 109.4 78.0 73.6   Hdl/Cholesterol Ratio 21.2 22.6 27.0 28.7   Non-HDL Cholesterol 171 123 100 97   Total Cholesterol/HDL Ratio 4.7 4.4 3.7 3.5     TSH:  Recent Labs   Lab 06/09/18  1208 06/09/18  1712   TSH 3.210 1.507     A1C:  Recent Labs   Lab 06/09/18  1712 03/21/19  0734   Hemoglobin A1C 6.2 H 6.0 H       Assessment/Plan     Isa Sewell is a 88 y.o.female with:    1. Eyelid lesion  - erythromycin (ROMYCIN) ophthalmic ointment; Place into affected eye 3x daily until resolved  Dispense: 1 g; Refill: 1  Will send in erythromycin and monitor for a few days.  If worsens, will refer to ophtho.    Dariel Obregon MD  [facility]

## 2020-07-14 ENCOUNTER — OFFICE VISIT (OUTPATIENT)
Dept: FAMILY MEDICINE | Facility: CLINIC | Age: 85
End: 2020-07-14
Payer: MEDICARE

## 2020-07-14 VITALS
SYSTOLIC BLOOD PRESSURE: 114 MMHG | HEART RATE: 64 BPM | OXYGEN SATURATION: 97 % | BODY MASS INDEX: 28.5 KG/M2 | TEMPERATURE: 97 F | DIASTOLIC BLOOD PRESSURE: 62 MMHG | WEIGHT: 154.88 LBS | HEIGHT: 62 IN

## 2020-07-14 DIAGNOSIS — I48.0 PAROXYSMAL ATRIAL FIBRILLATION: ICD-10-CM

## 2020-07-14 DIAGNOSIS — Z86.73 HX OF ISCHEMIC RIGHT MCA STROKE: ICD-10-CM

## 2020-07-14 DIAGNOSIS — L10.9 PEMPHIGUS: ICD-10-CM

## 2020-07-14 DIAGNOSIS — I10 BENIGN ESSENTIAL HTN: Primary | ICD-10-CM

## 2020-07-14 DIAGNOSIS — E78.2 MIXED HYPERLIPIDEMIA: ICD-10-CM

## 2020-07-14 DIAGNOSIS — Z86.718 HISTORY OF DVT (DEEP VEIN THROMBOSIS): ICD-10-CM

## 2020-07-14 PROBLEM — G40.309 NONINTRACTABLE GENERALIZED IDIOPATHIC EPILEPSY WITHOUT STATUS EPILEPTICUS: Status: ACTIVE | Noted: 2018-10-19

## 2020-07-14 PROCEDURE — 99214 PR OFFICE/OUTPT VISIT, EST, LEVL IV, 30-39 MIN: ICD-10-PCS | Mod: S$PBB,,, | Performed by: INTERNAL MEDICINE

## 2020-07-14 PROCEDURE — 99214 OFFICE O/P EST MOD 30 MIN: CPT | Mod: S$PBB,,, | Performed by: INTERNAL MEDICINE

## 2020-07-14 PROCEDURE — 99214 OFFICE O/P EST MOD 30 MIN: CPT | Mod: PBBFAC,PN | Performed by: INTERNAL MEDICINE

## 2020-07-14 PROCEDURE — 99999 PR PBB SHADOW E&M-EST. PATIENT-LVL IV: CPT | Mod: PBBFAC,,, | Performed by: INTERNAL MEDICINE

## 2020-07-14 PROCEDURE — 99999 PR PBB SHADOW E&M-EST. PATIENT-LVL IV: ICD-10-PCS | Mod: PBBFAC,,, | Performed by: INTERNAL MEDICINE

## 2020-07-14 RX ORDER — TRIMETHOPRIM 100 MG/1
TABLET ORAL
Status: ON HOLD | COMMUNITY
Start: 2020-06-17 | End: 2021-04-12 | Stop reason: SDUPTHER

## 2020-07-14 RX ORDER — CLOBETASOL PROPIONATE 0.5 MG/G
1 OINTMENT TOPICAL 2 TIMES DAILY
Qty: 60 G | Refills: 5 | Status: SHIPPED | OUTPATIENT
Start: 2020-07-14 | End: 2023-03-13 | Stop reason: SDUPTHER

## 2020-07-14 NOTE — PROGRESS NOTES
[facility] Note    Chief Complaint      Chief Complaint   Patient presents with    Hypertension     6 MONTH       History of Present Illness      Isa Sewell is a 89 y.o. female with chronic conditions of HTN, HLD, A fib, hx of stroke, hx of DVT, epilepsy, pemphigus, chronic low back pain who presents today for: follow up chronic conditions.  Complains of left knee locking in extension when trying to get out of car.  Able to bring it   A fib: Sees Dr. Chau.  On eliquis for anticoagulation.  On coreg for rate control.  Denies chest pain, shortness of breath, palpitations.    HTN: BP at goal on amlodipine, coreg, benicar  HLD: controlled with lipitor.  LDL 73 last check.    Hx of stroke: On eliquis and plavix for secondary prevention.  No new deficits.  Hx of DVT: Sees Dr. Koobehi.  On eliquis for anticoagulations.    Epilepsy: Sees Dr. Mata, neurology.  On keppra.  No recent seizure activity  Pemphigus: Sees Dr. Camarena, [facility], but not recently.  On clobetasol cream for maintenance.    Flu shot UTD.  Prevnar, pneumovax UTD.    Mammograms and cscopes N/A 2/2 age.    Past Medical History:  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/2016    Seizure 10/19/2018       Past Surgical History:   has a past surgical history that includes Hysterectomy; Tonsillectomy; Cholecystectomy; Fracture surgery (Right); and Breast biopsy (Bilateral).    Family History:  family history is not on file.     Social History:  Social History     Tobacco Use    Smoking status: Never Smoker    Smokeless tobacco: Never Used   Substance Use Topics    Alcohol use: No    Drug use: No       Review of Systems   Constitutional: Negative for chills, fever and malaise/fatigue.   Respiratory: Negative for  shortness of breath.    Cardiovascular: Negative for chest pain.   Gastrointestinal: Negative for constipation, diarrhea, nausea and vomiting.   Skin: Negative for rash.   Neurological: Negative for weakness.        Medications:  Outpatient Encounter Medications as of 7/14/2020   Medication Sig Dispense Refill    amLODIPine (NORVASC) 5 MG tablet Take 1 tablet (5 mg total) by mouth once daily. 90 tablet 3    apixaban (ELIQUIS) 5 mg Tab Take 1 tablet (5 mg total) by mouth 2 (two) times daily. 180 tablet 3    atorvastatin (LIPITOR) 40 MG tablet Take 1 tablet (40 mg total) by mouth once daily. 90 tablet 3    carvedilol (COREG) 6.25 MG tablet Take 1 tablet (6.25 mg total) by mouth 2 (two) times daily. 180 tablet 3    cetirizine (ZYRTEC) 10 MG tablet Take 10 mg by mouth once daily.      clopidogrel (PLAVIX) 75 mg tablet Take 1 tablet (75 mg total) by mouth once daily. 90 tablet 3    colchicine (COLCRYS) 0.6 mg tablet Take 2 tablets by mouth once and then 1 tablet by mouth one hour later, then one tablet daily until resolved 9 tablet 0    erythromycin (ROMYCIN) ophthalmic ointment Place into affected eye 3x daily until resolved 1 g 1    levETIRAcetam (KEPPRA) 500 MG Tab Take 1 tablet (500 mg total) by mouth 2 (two) times daily. 180 tablet 3    melatonin 3 mg Tab Take 1 tablet (3 mg total) by mouth every evening. 90 tablet 3    olmesartan (BENICAR) 40 MG tablet Take 1 tablet (40 mg total) by mouth once daily. 90 tablet 3    omeprazole (PRILOSEC OTC) 20 MG tablet Take 1 tablet (20 mg total) by mouth every morning. 90 tablet 3    triamcinolone acetonide 0.1% (KENALOG) 0.1 % ointment   3    trimethoprim (TRIMPEX) 100 mg Tab       apixaban (ELIQUIS) 5 mg (74 tabs) DsPk For the first 7 days take two 5 mg tablets twice daily.  After 7 days take one 5 mg tablet twice daily. (Patient not taking: Reported on 7/14/2020) 30 tablet 3    clobetasol 0.05% (TEMOVATE) 0.05 % Oint Apply 1 application topically 2 (two) times  "daily. Apply to affected area 60 g 5    hydrOXYzine HCl (ATARAX) 25 MG tablet Take 1 tablet (25 mg total) by mouth 3 (three) times daily as needed for Itching. (Patient not taking: Reported on 7/14/2020) 90 tablet 0    phenazopyridine (PYRIDIUM) 200 MG tablet Take 200 mg by mouth 3 (three) times daily as needed for Pain.      [DISCONTINUED] clobetasol 0.05% (TEMOVATE) 0.05 % Oint APPLY TO AFFECTED AREA TWICE A DAY 60 g 3     No facility-administered encounter medications on file as of 7/14/2020.        Allergies:  Review of patient's allergies indicates:   Allergen Reactions    Sulfa (sulfonamide antibiotics) Hives       Health Maintenance:  Immunization History   Administered Date(s) Administered    Influenza 10/24/2013, 11/04/2014    Influenza - High Dose - PF (65 years and older) 10/15/2019    Influenza - Quadrivalent 11/14/2017    Influenza - Trivalent (ADULT) 11/06/2009, 11/29/2012    Influenza - Trivalent - Adjuvanted - PF 09/27/2018    Influenza A (H1N1) 2009 Monovalent - IM - PF 11/06/2009    Pneumococcal Conjugate - 13 Valent 10/12/2018      Health Maintenance   Topic Date Due    TETANUS VACCINE  06/11/1949    Pneumococcal Vaccine (65+ Low/Medium Risk) (2 of 2 - PPSV23) 10/12/2019    Lipid Panel  10/18/2024        Physical Exam      Vital Signs  Temp: 97 °F (36.1 °C)  Temp src: Oral  Pulse: 64  SpO2: 97 %  BP: 114/62  BP Location: Right arm  Patient Position: Sitting  Pain Score: 0-No pain  Height and Weight  Height: 5' 2" (157.5 cm)  Weight: 70.2 kg (154 lb 14 oz)  BSA (Calculated - sq m): 1.75 sq meters  BMI (Calculated): 28.3  Weight in (lb) to have BMI = 25: 136.4]    Physical Exam  Vitals signs reviewed.   Constitutional:       Appearance: She is well-developed.      Comments: Walker for ambulation   HENT:      Head: Normocephalic and atraumatic.      Right Ear: External ear normal.      Left Ear: External ear normal.   Eyes:      Conjunctiva/sclera: Conjunctivae normal.      Pupils: " Pupils are equal, round, and reactive to light.   Neck:      Vascular: No carotid bruit.   Cardiovascular:      Rate and Rhythm: Normal rate and regular rhythm.      Heart sounds: Normal heart sounds. No murmur.   Pulmonary:      Effort: Pulmonary effort is normal.      Breath sounds: Normal breath sounds. No wheezing or rales.   Abdominal:      General: Bowel sounds are normal. There is no distension.      Palpations: Abdomen is soft.      Tenderness: There is no abdominal tenderness.          Laboratory:  CBC:  Recent Labs   Lab 10/21/18  0354 10/22/18  0312 10/18/19  0819   WBC 5.78 6.30 6.97   RBC 4.19 4.17 4.56   Hemoglobin 11.7 L 11.6 L 13.6   Hematocrit 37.9 37.4 42.4   Platelets 212 217 237   Mean Corpuscular Volume 91 90 93   Mean Corpuscular Hemoglobin 27.9 27.8 29.8   Mean Corpuscular Hemoglobin Conc 30.9 L 31.0 L 32.1     CMP:  Recent Labs   Lab 10/22/18  0312 03/21/19  0734 10/18/19  0819   Glucose 111 H 121 H 102   Calcium 9.5 9.7 9.5   Albumin 3.1 L 4.2 4.1   Total Protein 6.0 7.1 7.3   Sodium 143 148 H 144   Potassium 4.1 4.4 3.8   CO2 22 L 26 23   Chloride 110 111 H 112 H   BUN, Bld 17 24 H 18 H   Alkaline Phosphatase 84 91 115   ALT 14 19 18   AST 18 23 24   Total Bilirubin 0.3 0.4 0.5     URINALYSIS:  Recent Labs   Lab 10/19/18  1647   Color, UA Yellow   Specific Gravity, UA 1.020   pH, UA 5.0   Protein, UA 1+ A   Bacteria Moderate A   Nitrite, UA Negative   Leukocytes, UA 3+ A   Urobilinogen, UA Negative   Hyaline Casts, UA 5 A      LIPIDS:  Recent Labs   Lab 06/09/18  1208 06/09/18  1712 03/21/19  0734 10/18/19  0819   TSH 3.210 1.507  --   --    HDL 46 36 L 37 L 39 L   Cholesterol 217 H 159 137 136   Triglycerides 113 68 110 117   LDL Cholesterol 148.4 109.4 78.0 73.6   Hdl/Cholesterol Ratio 21.2 22.6 27.0 28.7   Non-HDL Cholesterol 171 123 100 97   Total Cholesterol/HDL Ratio 4.7 4.4 3.7 3.5     TSH:  Recent Labs   Lab 06/09/18  1208 06/09/18  1712   TSH 3.210 1.507     A1C:  Recent Labs   Lab  06/09/18  1712 03/21/19  0734   Hemoglobin A1C 6.2 H 6.0 H       Assessment/Plan     Isa Sewell is a 89 y.o.female with:    1. Benign essential HTN  Continue current meds.    2. Mixed hyperlipidemia  Continue current meds.  Repeat labs in 6 months.  3. Paroxysmal atrial fibrillation  Continue current meds.  F/U with Dr. Chau.  4. History of DVT (deep vein thrombosis)  Continue current meds.  F/u with Dr. Khoobehi.  5. Hx of ischemic right MCA stroke  Continue current meds.    6. Pemphigus  - clobetasol 0.05% (TEMOVATE) 0.05 % Oint; Apply 1 application topically 2 (two) times daily. Apply to affected area  Dispense: 60 g; Refill: 5  Not seeing Dr. Camarena unless as needed.  Will refill clobetasol.  7. Epilepsy  Continue current meds.  F/U with Dr. Mata.        Pt will call [facility] for knee pain.      Chronic conditions status updated as per HPI.  Other than changes above, cont current medications and maintain follow up with specialists.  Return to clinic in 6 months.    Dariel Obregon MD  [facility]

## 2020-08-17 ENCOUNTER — TELEPHONE (OUTPATIENT)
Dept: FAMILY MEDICINE | Facility: CLINIC | Age: 85
End: 2020-08-17

## 2020-08-17 NOTE — TELEPHONE ENCOUNTER
----- Message from Ovidio Packer sent at 8/17/2020 11:58 AM CDT -----  Contact: Pt daughter Melanie  Pt daughter Melanie called and said that Dr. Obregon referred the pt the an orthopedist but they can't remember who that is    Melanie can be reached at [phone]

## 2020-10-06 ENCOUNTER — TELEPHONE (OUTPATIENT)
Dept: FAMILY MEDICINE | Facility: CLINIC | Age: 85
End: 2020-10-06

## 2020-10-06 NOTE — TELEPHONE ENCOUNTER
----- Message from Los Ramirez sent at 10/6/2020 12:34 PM CDT -----  Type:  Needs Medical Advice    Who Called: Paulette Sullivan (daughter)  Symptoms (please be specific): pt's daughter is calling to schedule a flu shot for her mother  How long has patient had these symptoms:  unknown  Pharmacy name and phone #:  n/a  Would the patient rather a call back or a response via [facility]? Call back  Best Call Back Number:  [phone] (daughter's mobile)  Additional Information: none

## 2020-10-13 ENCOUNTER — CLINICAL SUPPORT (OUTPATIENT)
Dept: FAMILY MEDICINE | Facility: CLINIC | Age: 85
End: 2020-10-13
Payer: MEDICARE

## 2020-10-13 DIAGNOSIS — Z23 NEED FOR INFLUENZA VACCINATION: Primary | ICD-10-CM

## 2020-10-13 PROCEDURE — G0008 ADMIN INFLUENZA VIRUS VAC: HCPCS | Mod: PBBFAC,PN

## 2020-10-13 PROCEDURE — 90694 VACC AIIV4 NO PRSRV 0.5ML IM: CPT | Mod: PBBFAC,PN

## 2020-10-13 NOTE — PROGRESS NOTES
Pt arrived in clinic today to receive influenza vaccine. Vaccine administered in Right Deltoid. Pt tolerated procedure well. V.I.S. given upon arrival.

## 2020-10-20 DIAGNOSIS — M1A.9XX0 CHRONIC GOUT WITHOUT TOPHUS, UNSPECIFIED CAUSE, UNSPECIFIED SITE: ICD-10-CM

## 2020-10-20 DIAGNOSIS — I10 BENIGN ESSENTIAL HTN: Primary | ICD-10-CM

## 2020-10-20 DIAGNOSIS — E78.2 MIXED HYPERLIPIDEMIA: ICD-10-CM

## 2020-10-20 RX ORDER — CARVEDILOL 6.25 MG/1
6.25 TABLET ORAL 2 TIMES DAILY
Qty: 180 TABLET | Refills: 2 | Status: ON HOLD | OUTPATIENT
Start: 2020-10-20 | End: 2021-04-12 | Stop reason: HOSPADM

## 2020-10-20 RX ORDER — AMLODIPINE BESYLATE 5 MG/1
5 TABLET ORAL DAILY
Qty: 90 TABLET | Refills: 2 | Status: SHIPPED | OUTPATIENT
Start: 2020-10-20 | End: 2021-06-15 | Stop reason: SDUPTHER

## 2020-10-20 RX ORDER — ATORVASTATIN CALCIUM 40 MG/1
40 TABLET, FILM COATED ORAL DAILY
Qty: 90 TABLET | Refills: 0 | Status: SHIPPED | OUTPATIENT
Start: 2020-10-20 | End: 2021-01-19 | Stop reason: SDUPTHER

## 2020-10-20 RX ORDER — OLMESARTAN MEDOXOMIL 40 MG/1
40 TABLET ORAL DAILY
Qty: 90 TABLET | Refills: 0 | Status: SHIPPED | OUTPATIENT
Start: 2020-10-20 | End: 2021-01-19 | Stop reason: SDUPTHER

## 2020-10-20 NOTE — TELEPHONE ENCOUNTER
Care Due:                  Date            Visit Type   Department     Provider  --------------------------------------------------------------------------------                                ESTABLISHED   DESC FAMILY  Last Visit: 07-      PATIENT      [facility]  Dariel Thurman                              ESTABLISHED   DESC FAMILY  Next Visit: 01-      [facility]  Dariel Thurman                                                            Last  Test          Frequency    Reason                     Performed    Due Date  --------------------------------------------------------------------------------    ALT.........  12 months..  atorvastatin.............  10-   10-    AST.........  12 months..  atorvastatin.............  10-   10-    Cr..........  12 months..  colchicine, olmesartan...  10-   10-    HCT.........  12 months..  clopidogrel..............  10-   10-    HDL.........  12 months..  atorvastatin.............  10-   10-    HGB.........  12 months..  clopidogrel..............  10-   10-    K...........  12 months..  olmesartan...............  10-   10-    LDL.........  12 months..  atorvastatin.............  10-   10-    PLT.........  12 months..  clopidogrel..............  10-   10-    Total         12 months..  atorvastatin.............  10-   10-  Cholesterol.    Triglyceride  12 months..  atorvastatin.............  10-   10-  s...........    Uric Acid...  12 months..  colchicine...............  Not Found    Overdue    Powered by [employer]. Reference number: [id number]. 10/20/2020 8:31:56 AM   CDT

## 2020-10-21 RX ORDER — CLOPIDOGREL BISULFATE 75 MG/1
75 TABLET ORAL DAILY
Qty: 90 TABLET | Refills: 1 | Status: SHIPPED | OUTPATIENT
Start: 2020-10-21 | End: 2021-03-17 | Stop reason: SDUPTHER

## 2020-10-21 RX ORDER — LEVETIRACETAM 500 MG/1
500 TABLET ORAL 2 TIMES DAILY
Qty: 180 TABLET | Refills: 3 | Status: SHIPPED | OUTPATIENT
Start: 2020-10-21 | End: 2021-09-28 | Stop reason: SDUPTHER

## 2020-10-21 RX ORDER — OMEPRAZOLE 20 MG/1
20 TABLET, DELAYED RELEASE ORAL EVERY MORNING
Qty: 90 TABLET | Refills: 1 | Status: SHIPPED | OUTPATIENT
Start: 2020-10-21 | End: 2021-03-17 | Stop reason: SDUPTHER

## 2020-10-21 NOTE — PROGRESS NOTES
Provider Staff:     Action is required for this patient.     Please schedule patient for Labs (CMP/LIPID/CBC/URIC ACID)    Thanks!  [facility]     Appointments  past 12m or future 3m with PCP    Date Provider   Last Visit   7/14/2020 Dariel Obregon MD   Next Visit   1/13/2021 Dariel Obregon MD     Note composed:9:23 AM 10/21/2020

## 2020-10-21 NOTE — PROGRESS NOTES
Refill Routing Note   Medication(s) are not appropriate for processing by [facility] for the following reason(s):     - Outside of protocol  - Drug-Drug Interaction (omeprazole and clopidogrel)  - Required indication for medication not on problem list (GERD)    [facility] actions taken in this encounter:   Defer  Route  Approve    Medication-related problems identified:   Requires labs  Drug-drug interaction  Medication Therapy Plan: CDMR: LABS: (CMP/LIPID/CBC/URIC ACID); GERD not on problem list; Patient has been taking clopidogreL and omeprazole since (11/19); Drug drug interaction; Defer clopidogreL and omeprazole; Route apixaban and levETIRAcetam; Approve amLODIPine, carvediloL; atorvastatin and olmesartan   Medication reconciliation completed: No   Automatic Epic Generated Protocol Data:    Orders Placed This Encounter    Comprehensive Metabolic Panel    Lipid Panel    Uric Acid    CBC Without Differential    carvediloL (COREG) 6.25 MG tablet    olmesartan (BENICAR) 40 MG tablet    atorvastatin (LIPITOR) 40 MG tablet    amLODIPine (NORVASC) 5 MG tablet      Requested Prescriptions   Pending Prescriptions Disp Refills    apixaban (ELIQUIS) 5 mg Tab 180 tablet 3     Sig: Take 1 tablet (5 mg total) by mouth 2 (two) times daily.       There is no refill protocol information for this order       levETIRAcetam (KEPPRA) 500 MG Tab 180 tablet 3     Sig: Take 1 tablet (500 mg total) by mouth 2 (two) times daily.       Anticonvulsants Protocol Passed - 10/20/2020  8:31 AM        Passed - Visit with Authorizing provider in past 9 months or upcoming 90 days        Passed - No active pregnancy on record          clopidogreL (PLAVIX) 75 mg tablet 90 tablet 1     Sig: Take 1 tablet (75 mg total) by mouth once daily.       Hematology: Antiplatelets - clopidogrel Failed - 10/20/2020  8:31 AM        Failed - No contraindicated proton pump inhibitors on active medication list        Failed - HCT in normal range and  within 360 days     Hematocrit   Date Value Ref Range Status   10/18/2019 42.4 37.0 - 48.5 % Final   10/22/2018 37.4 37.0 - 48.5 % Final   10/21/2018 37.9 37.0 - 48.5 % Final              Failed - HGB in normal range and within 360 days     Hemoglobin   Date Value Ref Range Status   10/18/2019 13.6 12.0 - 16.0 g/dL Final   10/22/2018 11.6 (L) 12.0 - 16.0 g/dL Final   10/21/2018 11.7 (L) 12.0 - 16.0 g/dL Final              Failed - PLT in normal range and within 360 days     Platelets   Date Value Ref Range Status   10/18/2019 237 150 - 350 K/uL Final   10/22/2018 217 150 - 350 K/uL Final   10/21/2018 212 150 - 350 K/uL Final              Passed - Patient is at least 18 years old        Passed - Office Visit within last 12 months or future 90 days.     Recent Outpatient Visits            3 months ago Benign essential HTN    [facility] Dariel Obregon MD    6 months ago Eyelid lesion    [facility] Dariel Obregon MD    1 year ago Benign essential HTN    [facility] Dariel Obregon MD    1 year ago Seizure disorder    [place] - Neurology Vaibhav Mata MD    2 years ago Embolic stroke involving right middle cerebral artery    [facility] - Neurosurgery [facility] Juan Ashton MD          Future Appointments              In 2 months Dariel Obregon MD [facility], [place]                  omeprazole (PRILOSEC OTC) 20 MG tablet 90 tablet 1     Sig: Take 1 tablet (20 mg total) by mouth every morning.       Gastroenterology: Proton Pump Inhibitors Failed - 10/20/2020  8:31 AM        Failed - Plavix is not on active medication list        Failed - GERD is on problem list        Passed - Patient is at least 18 years old        Passed - Osteoporosis is not on problem list        Passed - Office Visit within last 12 months or future 90 days.     Recent Outpatient Visits            3 months ago Benign essential HTN    [facility] Dariel Obregon,  MD    6 months ago Eyelid lesion    [facility] Dariel Obregon MD    1 year ago Benign essential HTN    [facility] Dariel Obregon MD    1 year ago Seizure disorder    [facility] Neurology Vaibhav Mata MD    2 years ago Embolic stroke involving right middle cerebral artery    [facility] Neurosurgery [facility] Juan Ashton MD          Future Appointments              In 2 months [facility]                 Signed Prescriptions Disp Refills    carvediloL (COREG) 6.25 MG tablet 180 tablet 2     Sig: Take 1 tablet (6.25 mg total) by mouth 2 (two) times daily.       Cardiovascular:  Beta Blockers Passed - 10/20/2020  8:31 AM        Passed - Patient is at least 18 years old        Passed - Last BP in normal range within 360 days     BP Readings from Last 3 Encounters:   07/14/20 114/62   10/15/19 120/70   05/21/19 120/60              Passed - Last Heart Rate in normal range within 360 days     Pulse Readings from Last 3 Encounters:   07/14/20 64   10/15/19 44   05/21/19 57             Passed - Office Visit within last 12 months or future 90 days.     Recent Outpatient Visits            3 months ago Benign essential HTN    [facility] Dariel Obregon MD    6 months ago Eyelid lesion    [facility] Dariel Obregon MD    1 year ago Benign essential HTN    [facility] Dariel Obregon MD    1 year ago Seizure disorder    [facility] Neurology Vaibhav Mata MD    2 years ago Embolic stroke involving right middle cerebral artery    [address] Juan Ashton MD          Future Appointments              In 2 months [facility]                  olmesartan (BENICAR) 40 MG tablet 90 tablet 0     Sig: Take 1 tablet (40 mg total) by mouth once daily.       Cardiovascular:  Angiotensin Receptor Blockers Failed - 10/20/2020  8:31 AM         Failed - Cr is 1.3 or below and within 360 days     Creatinine   Date Value Ref Range Status   10/18/2019 0.76 0.50 - 1.40 mg/dL Final   03/21/2019 0.95 0.50 - 1.40 mg/dL Final   10/22/2018 0.7 0.5 - 1.4 mg/dL Final              Failed - K in normal range and within 360 days     Potassium   Date Value Ref Range Status   10/18/2019 3.8 3.5 - 5.1 mmol/L Final   03/21/2019 4.4 3.5 - 5.1 mmol/L Final   10/22/2018 4.1 3.5 - 5.1 mmol/L Final              Failed - eGFR within 360 days     eGFR if non    Date Value Ref Range Status   10/18/2019 >60.0 >60 mL/min/1.73 m^2 Final     Comment:     Calculation used to obtain the estimated glomerular filtration  rate (eGFR) is the CKD-EPI equation.      03/21/2019 54.0 (A) >60 mL/min/1.73 m^2 Final     Comment:     Calculation used to obtain the estimated glomerular filtration  rate (eGFR) is the CKD-EPI equation.      10/22/2018 >60.0 >60 mL/min/1.73 m^2 Final     Comment:     Calculation used to obtain the estimated glomerular filtration  rate (eGFR) is the CKD-EPI equation.        eGFR if    Date Value Ref Range Status   10/18/2019 >60.0 >60 mL/min/1.73 m^2 Final   03/21/2019 >60.0 >60 mL/min/1.73 m^2 Final   10/22/2018 >60.0 >60 mL/min/1.73 m^2 Final              Passed - Patient is at least 18 years old        Passed - Last BP in normal range within 360 days     BP Readings from Last 3 Encounters:   07/14/20 114/62   10/15/19 120/70   05/21/19 120/60              Passed - Office Visit within last 12 months or future 90 days.     Recent Outpatient Visits            3 months ago Benign essential HTN    [place] [facility] Dariel Obregon MD    6 months ago Eyelid lesion    [place] [facility] Dariel Obregon MD    1 year ago Benign essential HTN    [place] [facility] Dariel Obregon MD    1 year ago Seizure disorder    [place] - Neurology Vaibhav Mata MD    2 years ago Embolic stroke involving right middle cerebral  artery    [facility] - Neurosurgery 7th Fl Juan Ashton MD          Future Appointments              In 2 months Dariel Obregon MD [facility]                  atorvastatin (LIPITOR) 40 MG tablet 90 tablet 0     Sig: Take 1 tablet (40 mg total) by mouth once daily.       Cardiovascular:  Antilipid - Statins Failed - 10/20/2020  8:31 AM        Failed - ALT is 94 or below and within 360 days     ALT   Date Value Ref Range Status   10/18/2019 18 10 - 44 U/L Final   03/21/2019 19 10 - 44 U/L Final   10/22/2018 14 10 - 44 U/L Final              Failed - AST is 54 or below and within 360 days     AST   Date Value Ref Range Status   10/18/2019 24 15 - 46 U/L Final   03/21/2019 23 15 - 46 U/L Final   10/22/2018 18 10 - 40 U/L Final              Failed - Total Cholesterol within 360 days     Cholesterol   Date Value Ref Range Status   10/18/2019 136 120 - 199 mg/dL Final     Comment:     The National Cholesterol Education Program (NCEP) has set the  following guidelines (reference ranges) for Cholesterol:  Optimal.....................<200 mg/dL  Borderline High.............200-239 mg/dL  High........................> or = 240 mg/dL     03/21/2019 137 120 - 199 mg/dL Final     Comment:     The National Cholesterol Education Program (NCEP) has set the  following guidelines (reference ranges) for Cholesterol:  Optimal.....................<200 mg/dL  Borderline High.............200-239 mg/dL  High........................> or = 240 mg/dL     06/09/2018 159 120 - 199 mg/dL Final     Comment:     The National Cholesterol Education Program (NCEP) has set the  following guidelines (reference ranges) for Cholesterol:  Optimal.....................<200 mg/dL  Borderline High.............200-239 mg/dL  High........................> or = 240 mg/dL                Failed - LDL within 360 days     LDL Cholesterol   Date Value Ref Range Status   10/18/2019 73.6 63.0 - 159.0 mg/dL Final     Comment:     The  National Cholesterol Education Program (NCEP) has set the  following guidelines (reference values) for LDL Cholesterol:  Optimal.......................<130 mg/dL  Borderline High...............130-159 mg/dL  High..........................160-189 mg/dL  Very High.....................>190 mg/dL              Failed - HDL within 360 days     HDL   Date Value Ref Range Status   10/18/2019 39 (L) 40 - 75 mg/dL Final     Comment:     The National Cholesterol Education Program (NCEP) has set the  following guidelines (reference values) for HDL Cholesterol:  Low...............<40 mg/dL  Optimal...........>60 mg/dL              Failed - Triglycerides within 360 days     Triglycerides   Date Value Ref Range Status   10/18/2019 117 30 - 150 mg/dL Final     Comment:     The National Cholesterol Education Program (NCEP) has set the  following guidelines (reference values) for triglycerides:  Normal......................<150 mg/dL  Borderline High.............150-199 mg/dL  High........................200-499 mg/dL     03/21/2019 110 30 - 150 mg/dL Final     Comment:     The National Cholesterol Education Program (NCEP) has set the  following guidelines (reference values) for triglycerides:  Normal......................<150 mg/dL  Borderline High.............150-199 mg/dL  High........................200-499 mg/dL     06/09/2018 68 30 - 150 mg/dL Final     Comment:     The National Cholesterol Education Program (NCEP) has set the  following guidelines (reference values) for triglycerides:  Normal......................<150 mg/dL  Borderline High.............150-199 mg/dL  High........................200-499 mg/dL                Passed - Patient is at least 18 years old        Passed - Office Visit within last 12 months or future 90 days.     Recent Outpatient Visits            3 months ago Benign essential HTN    [facility] Medicine Dariel Obregon MD    6 months ago Eyelid lesion    [facility] Dariel PARIKH  MD [place]    1 year ago Benign essential HTN    [facility] Dariel Obregon MD    1 year ago Seizure disorder    [facility] Neurology Vaibhav Mata MD    2 years ago Embolic stroke involving right middle cerebral artery    [facility] - Neurosurgery [facility] Juan Ashton MD          Future Appointments              In 2 months Dariel Obregon MD [facility] [place]                  amLODIPine (NORVASC) 5 MG tablet 90 tablet 2     Sig: Take 1 tablet (5 mg total) by mouth once daily.       Cardiovascular:  Calcium Channel Blockers Passed - 10/20/2020  8:31 AM        Passed - Patient is at least 18 years old        Passed - Last BP in normal range within 360 days     BP Readings from Last 3 Encounters:   07/14/20 114/62   10/15/19 120/70   05/21/19 120/60              Passed - Office Visit within last 12 months or future 90 days.     Recent Outpatient Visits            3 months ago Benign essential HTN    [facility] Dariel Obregon MD    6 months ago Eyelid lesion    [facility] Dariel Obregon MD    1 year ago Benign essential HTN    [facility] Dariel Obregon MD    1 year ago Seizure disorder    [facility] Neurology Vaibhav Mata MD    2 years ago Embolic stroke involving right middle cerebral artery    [facility] - Neurosurgery [facility] Juan Ashton MD          Future Appointments              In 2 months [facility]                      Appointments  past 12m or future 3m with PCP    Date Provider   Last Visit   7/14/2020 Dariel Obregon MD   Next Visit   1/13/2021 Dariel Obregon MD   ED visits in past 90 days: 0        Note composed:9:41 PM 10/20/2020

## 2020-12-30 ENCOUNTER — TELEPHONE (OUTPATIENT)
Dept: FAMILY MEDICINE | Facility: CLINIC | Age: 85
End: 2020-12-30

## 2020-12-30 NOTE — TELEPHONE ENCOUNTER
----- Message from Talia Hernandez sent at 12/30/2020  1:23 PM CST -----  Type:  Needs Medical Advice    Who Called: pt's daughter Paulette Sullivan  Advice Regarding: medication question  Would the patient rather a call back or a response via [employer]? call  Best Call Back Number:   Additional Information: n/a

## 2021-01-03 ENCOUNTER — TELEPHONE (OUTPATIENT)
Dept: FAMILY MEDICINE | Facility: CLINIC | Age: 86
End: 2021-01-03

## 2021-01-03 DIAGNOSIS — Z03.818 ENCOUNTER FOR OBSERVATION FOR SUSPECTED EXPOSURE TO OTHER BIOLOGICAL AGENTS RULED OUT: ICD-10-CM

## 2021-01-03 DIAGNOSIS — Z20.822 CLOSE EXPOSURE TO COVID-19 VIRUS: Primary | ICD-10-CM

## 2021-01-04 ENCOUNTER — TELEPHONE (OUTPATIENT)
Dept: FAMILY MEDICINE | Facility: CLINIC | Age: 86
End: 2021-01-04

## 2021-01-04 ENCOUNTER — INFUSION (OUTPATIENT)
Dept: INFECTIOUS DISEASES | Facility: HOSPITAL | Age: 86
End: 2021-01-04
Attending: FAMILY MEDICINE
Payer: MEDICARE

## 2021-01-04 ENCOUNTER — CLINICAL SUPPORT (OUTPATIENT)
Dept: URGENT CARE | Facility: CLINIC | Age: 86
End: 2021-01-04
Payer: MEDICARE

## 2021-01-04 ENCOUNTER — OFFICE VISIT (OUTPATIENT)
Dept: URGENT CARE | Facility: CLINIC | Age: 86
End: 2021-01-04
Payer: MEDICARE

## 2021-01-04 VITALS
DIASTOLIC BLOOD PRESSURE: 62 MMHG | HEIGHT: 62 IN | TEMPERATURE: 98 F | HEART RATE: 68 BPM | RESPIRATION RATE: 20 BRPM | BODY MASS INDEX: 28.34 KG/M2 | OXYGEN SATURATION: 96 % | WEIGHT: 154 LBS | SYSTOLIC BLOOD PRESSURE: 124 MMHG

## 2021-01-04 VITALS
RESPIRATION RATE: 20 BRPM | DIASTOLIC BLOOD PRESSURE: 68 MMHG | HEART RATE: 64 BPM | TEMPERATURE: 99 F | SYSTOLIC BLOOD PRESSURE: 146 MMHG | OXYGEN SATURATION: 96 %

## 2021-01-04 VITALS — TEMPERATURE: 99 F

## 2021-01-04 DIAGNOSIS — U07.1 COVID-19: Primary | ICD-10-CM

## 2021-01-04 DIAGNOSIS — U07.1 COVID-19 VIRUS INFECTION: Primary | ICD-10-CM

## 2021-01-04 DIAGNOSIS — R09.89 RUNNY NOSE: Primary | ICD-10-CM

## 2021-01-04 LAB
CTP QC/QA: YES
SARS-COV-2 RDRP RESP QL NAA+PROBE: POSITIVE

## 2021-01-04 PROCEDURE — 63600175 PHARM REV CODE 636 W HCPCS: Performed by: FAMILY MEDICINE

## 2021-01-04 PROCEDURE — 99214 OFFICE O/P EST MOD 30 MIN: CPT | Mod: CR,S$GLB,, | Performed by: PHYSICIAN ASSISTANT

## 2021-01-04 PROCEDURE — 99214 PR OFFICE/OUTPT VISIT, EST, LEVL IV, 30-39 MIN: ICD-10-PCS | Mod: CR,S$GLB,, | Performed by: PHYSICIAN ASSISTANT

## 2021-01-04 PROCEDURE — 25000003 PHARM REV CODE 250: Performed by: FAMILY MEDICINE

## 2021-01-04 PROCEDURE — M0243 CASIRIVI AND IMDEVI INFUSION: HCPCS | Performed by: FAMILY MEDICINE

## 2021-01-04 PROCEDURE — 96365 THER/PROPH/DIAG IV INF INIT: CPT

## 2021-01-04 RX ORDER — SODIUM CHLORIDE 0.9 % (FLUSH) 0.9 %
10 SYRINGE (ML) INJECTION
Status: DISCONTINUED | OUTPATIENT
Start: 2021-01-04 | End: 2021-04-11

## 2021-01-04 RX ORDER — ONDANSETRON 4 MG/1
4 TABLET, ORALLY DISINTEGRATING ORAL ONCE AS NEEDED
Status: DISCONTINUED | OUTPATIENT
Start: 2021-01-04 | End: 2021-04-11

## 2021-01-04 RX ORDER — ACETAMINOPHEN 325 MG/1
650 TABLET ORAL ONCE AS NEEDED
Status: DISCONTINUED | OUTPATIENT
Start: 2021-01-04 | End: 2022-09-19

## 2021-01-04 RX ORDER — EPINEPHRINE 0.3 MG/.3ML
0.3 INJECTION SUBCUTANEOUS
Status: DISCONTINUED | OUTPATIENT
Start: 2021-01-04 | End: 2021-04-11

## 2021-01-04 RX ORDER — DIPHENHYDRAMINE HYDROCHLORIDE 50 MG/ML
25 INJECTION INTRAMUSCULAR; INTRAVENOUS ONCE AS NEEDED
Status: DISCONTINUED | OUTPATIENT
Start: 2021-01-04 | End: 2021-04-11

## 2021-01-04 RX ADMIN — CASIRIVIMAB: 300 INJECTION, SOLUTION, CONCENTRATE INTRAVENOUS at 01:01

## 2021-01-05 ENCOUNTER — PATIENT MESSAGE (OUTPATIENT)
Dept: ADMINISTRATIVE | Facility: OTHER | Age: 86
End: 2021-01-05

## 2021-01-05 ENCOUNTER — NURSE TRIAGE (OUTPATIENT)
Dept: ADMINISTRATIVE | Facility: CLINIC | Age: 86
End: 2021-01-05

## 2021-01-05 ENCOUNTER — TELEPHONE (OUTPATIENT)
Dept: ADMINISTRATIVE | Facility: OTHER | Age: 86
End: 2021-01-05

## 2021-01-06 ENCOUNTER — PATIENT MESSAGE (OUTPATIENT)
Dept: FAMILY MEDICINE | Facility: CLINIC | Age: 86
End: 2021-01-06

## 2021-01-06 ENCOUNTER — PATIENT MESSAGE (OUTPATIENT)
Dept: ADMINISTRATIVE | Facility: OTHER | Age: 86
End: 2021-01-06

## 2021-01-07 ENCOUNTER — PATIENT MESSAGE (OUTPATIENT)
Dept: ADMINISTRATIVE | Facility: OTHER | Age: 86
End: 2021-01-07

## 2021-01-08 ENCOUNTER — PATIENT MESSAGE (OUTPATIENT)
Dept: ADMINISTRATIVE | Facility: OTHER | Age: 86
End: 2021-01-08

## 2021-01-11 ENCOUNTER — TELEPHONE (OUTPATIENT)
Dept: FAMILY MEDICINE | Facility: CLINIC | Age: 86
End: 2021-01-11

## 2021-01-11 ENCOUNTER — NURSE TRIAGE (OUTPATIENT)
Dept: ADMINISTRATIVE | Facility: CLINIC | Age: 86
End: 2021-01-11

## 2021-01-13 ENCOUNTER — PATIENT MESSAGE (OUTPATIENT)
Dept: ADMINISTRATIVE | Facility: CLINIC | Age: 86
End: 2021-01-13

## 2021-01-13 ENCOUNTER — OFFICE VISIT (OUTPATIENT)
Dept: FAMILY MEDICINE | Facility: CLINIC | Age: 86
End: 2021-01-13
Payer: MEDICARE

## 2021-01-13 ENCOUNTER — PATIENT MESSAGE (OUTPATIENT)
Dept: ADMINISTRATIVE | Facility: OTHER | Age: 86
End: 2021-01-13

## 2021-01-13 DIAGNOSIS — U07.1 COVID-19: ICD-10-CM

## 2021-01-13 DIAGNOSIS — Z86.718 HISTORY OF DVT (DEEP VEIN THROMBOSIS): ICD-10-CM

## 2021-01-13 DIAGNOSIS — G89.29 CHRONIC LEFT-SIDED LOW BACK PAIN WITHOUT SCIATICA: ICD-10-CM

## 2021-01-13 DIAGNOSIS — Z86.73 HX OF ISCHEMIC RIGHT MCA STROKE: ICD-10-CM

## 2021-01-13 DIAGNOSIS — E78.2 MIXED HYPERLIPIDEMIA: ICD-10-CM

## 2021-01-13 DIAGNOSIS — L10.9 PEMPHIGUS: ICD-10-CM

## 2021-01-13 DIAGNOSIS — M54.50 CHRONIC LEFT-SIDED LOW BACK PAIN WITHOUT SCIATICA: ICD-10-CM

## 2021-01-13 DIAGNOSIS — I10 BENIGN ESSENTIAL HTN: Primary | ICD-10-CM

## 2021-01-13 DIAGNOSIS — G40.309 NONINTRACTABLE GENERALIZED IDIOPATHIC EPILEPSY WITHOUT STATUS EPILEPTICUS: ICD-10-CM

## 2021-01-13 DIAGNOSIS — I48.0 PAROXYSMAL ATRIAL FIBRILLATION: ICD-10-CM

## 2021-01-13 PROCEDURE — 99214 PR OFFICE/OUTPT VISIT, EST, LEVL IV, 30-39 MIN: ICD-10-PCS | Mod: CR,95,, | Performed by: INTERNAL MEDICINE

## 2021-01-13 PROCEDURE — 99214 OFFICE O/P EST MOD 30 MIN: CPT | Mod: CR,95,, | Performed by: INTERNAL MEDICINE

## 2021-01-14 ENCOUNTER — TELEPHONE (OUTPATIENT)
Dept: ADMINISTRATIVE | Facility: OTHER | Age: 86
End: 2021-01-14

## 2021-01-21 RX ORDER — ATORVASTATIN CALCIUM 40 MG/1
40 TABLET, FILM COATED ORAL DAILY
Qty: 90 TABLET | Refills: 0 | Status: SHIPPED | OUTPATIENT
Start: 2021-01-21 | End: 2021-03-17 | Stop reason: SDUPTHER

## 2021-01-21 RX ORDER — OLMESARTAN MEDOXOMIL 40 MG/1
40 TABLET ORAL DAILY
Qty: 90 TABLET | Refills: 0 | Status: SHIPPED | OUTPATIENT
Start: 2021-01-21 | End: 2021-03-17 | Stop reason: SDUPTHER

## 2021-02-05 ENCOUNTER — PATIENT MESSAGE (OUTPATIENT)
Dept: PRIMARY CARE CLINIC | Facility: CLINIC | Age: 86
End: 2021-02-05

## 2021-03-19 RX ORDER — OLMESARTAN MEDOXOMIL 40 MG/1
40 TABLET ORAL DAILY
Qty: 90 TABLET | Refills: 3 | Status: SHIPPED | OUTPATIENT
Start: 2021-03-19 | End: 2022-02-15 | Stop reason: SDUPTHER

## 2021-03-19 RX ORDER — OMEPRAZOLE 20 MG/1
20 TABLET, DELAYED RELEASE ORAL EVERY MORNING
Qty: 90 TABLET | Refills: 3 | Status: SHIPPED | OUTPATIENT
Start: 2021-03-19 | End: 2022-02-15 | Stop reason: SDUPTHER

## 2021-03-19 RX ORDER — CLOPIDOGREL BISULFATE 75 MG/1
75 TABLET ORAL DAILY
Qty: 90 TABLET | Refills: 3 | Status: SHIPPED | OUTPATIENT
Start: 2021-03-19 | End: 2022-02-15 | Stop reason: SDUPTHER

## 2021-03-19 RX ORDER — ATORVASTATIN CALCIUM 40 MG/1
40 TABLET, FILM COATED ORAL DAILY
Qty: 90 TABLET | Refills: 3 | Status: SHIPPED | OUTPATIENT
Start: 2021-03-19 | End: 2022-02-15 | Stop reason: SDUPTHER

## 2021-04-11 PROBLEM — R55 SYNCOPE: Status: ACTIVE | Noted: 2021-04-11

## 2021-04-11 PROBLEM — R79.89 ELEVATED TROPONIN: Status: ACTIVE | Noted: 2021-04-11

## 2021-04-12 PROBLEM — R82.90 ABNORMAL URINALYSIS: Status: ACTIVE | Noted: 2021-04-12

## 2021-04-12 PROBLEM — R73.03 BORDERLINE DIABETES: Status: ACTIVE | Noted: 2021-04-12

## 2021-04-13 ENCOUNTER — PATIENT MESSAGE (OUTPATIENT)
Dept: FAMILY MEDICINE | Facility: CLINIC | Age: 86
End: 2021-04-13

## 2021-04-13 DIAGNOSIS — N39.0 URINARY TRACT INFECTION WITHOUT HEMATURIA, SITE UNSPECIFIED: Primary | ICD-10-CM

## 2021-04-15 ENCOUNTER — IMMUNIZATION (OUTPATIENT)
Dept: FAMILY MEDICINE | Facility: CLINIC | Age: 86
End: 2021-04-15
Payer: MEDICARE

## 2021-04-15 DIAGNOSIS — Z23 NEED FOR VACCINATION: Primary | ICD-10-CM

## 2021-04-15 PROCEDURE — 91300 COVID-19, MRNA, LNP-S, PF, 30 MCG/0.3 ML DOSE VACCINE: CPT | Mod: PBBFAC,PN

## 2021-04-20 ENCOUNTER — PATIENT MESSAGE (OUTPATIENT)
Dept: FAMILY MEDICINE | Facility: CLINIC | Age: 86
End: 2021-04-20

## 2021-04-27 ENCOUNTER — OFFICE VISIT (OUTPATIENT)
Dept: FAMILY MEDICINE | Facility: CLINIC | Age: 86
End: 2021-04-27
Payer: MEDICARE

## 2021-04-27 VITALS
BODY MASS INDEX: 27.77 KG/M2 | DIASTOLIC BLOOD PRESSURE: 68 MMHG | HEART RATE: 87 BPM | HEIGHT: 62 IN | TEMPERATURE: 97 F | SYSTOLIC BLOOD PRESSURE: 112 MMHG | WEIGHT: 150.88 LBS | OXYGEN SATURATION: 95 %

## 2021-04-27 DIAGNOSIS — I69.359 HEMIPLEGIA AND HEMIPARESIS FOLLOWING CEREBRAL INFARCTION AFFECTING UNSPECIFIED SIDE: ICD-10-CM

## 2021-04-27 DIAGNOSIS — I48.0 PAROXYSMAL ATRIAL FIBRILLATION: ICD-10-CM

## 2021-04-27 DIAGNOSIS — N18.31 STAGE 3A CHRONIC KIDNEY DISEASE: ICD-10-CM

## 2021-04-27 DIAGNOSIS — I10 BENIGN ESSENTIAL HTN: ICD-10-CM

## 2021-04-27 DIAGNOSIS — R55 SYNCOPE, UNSPECIFIED SYNCOPE TYPE: Primary | ICD-10-CM

## 2021-04-27 DIAGNOSIS — N39.0 URINARY TRACT INFECTION WITHOUT HEMATURIA, SITE UNSPECIFIED: ICD-10-CM

## 2021-04-27 PROCEDURE — 99214 OFFICE O/P EST MOD 30 MIN: CPT | Mod: PBBFAC,PN | Performed by: INTERNAL MEDICINE

## 2021-04-27 PROCEDURE — 99214 OFFICE O/P EST MOD 30 MIN: CPT | Mod: S$PBB,,, | Performed by: INTERNAL MEDICINE

## 2021-04-27 PROCEDURE — 99999 PR PBB SHADOW E&M-EST. PATIENT-LVL IV: ICD-10-PCS | Mod: PBBFAC,,, | Performed by: INTERNAL MEDICINE

## 2021-04-27 PROCEDURE — 99214 PR OFFICE/OUTPT VISIT, EST, LEVL IV, 30-39 MIN: ICD-10-PCS | Mod: S$PBB,,, | Performed by: INTERNAL MEDICINE

## 2021-04-27 PROCEDURE — 99999 PR PBB SHADOW E&M-EST. PATIENT-LVL IV: CPT | Mod: PBBFAC,,, | Performed by: INTERNAL MEDICINE

## 2021-05-06 ENCOUNTER — IMMUNIZATION (OUTPATIENT)
Dept: FAMILY MEDICINE | Facility: CLINIC | Age: 86
End: 2021-05-06
Payer: MEDICARE

## 2021-05-06 DIAGNOSIS — Z23 NEED FOR VACCINATION: Primary | ICD-10-CM

## 2021-05-06 PROCEDURE — 91300 COVID-19, MRNA, LNP-S, PF, 30 MCG/0.3 ML DOSE VACCINE: CPT | Mod: S$GLB,,, | Performed by: FAMILY MEDICINE

## 2021-05-06 PROCEDURE — 0002A COVID-19, MRNA, LNP-S, PF, 30 MCG/0.3 ML DOSE VACCINE: CPT | Mod: CV19,S$GLB,, | Performed by: FAMILY MEDICINE

## 2021-05-06 PROCEDURE — 0002A COVID-19, MRNA, LNP-S, PF, 30 MCG/0.3 ML DOSE VACCINE: ICD-10-PCS | Mod: CV19,S$GLB,, | Performed by: FAMILY MEDICINE

## 2021-05-06 PROCEDURE — 91300 COVID-19, MRNA, LNP-S, PF, 30 MCG/0.3 ML DOSE VACCINE: ICD-10-PCS | Mod: S$GLB,,, | Performed by: FAMILY MEDICINE

## 2021-06-15 RX ORDER — CARVEDILOL 6.25 MG/1
6.25 TABLET ORAL 2 TIMES DAILY
COMMUNITY
Start: 2021-06-12 | End: 2021-06-15 | Stop reason: SDUPTHER

## 2021-06-15 RX ORDER — CARVEDILOL 6.25 MG/1
6.25 TABLET ORAL 2 TIMES DAILY
Qty: 180 TABLET | Refills: 2 | Status: SHIPPED | OUTPATIENT
Start: 2021-06-15 | End: 2022-09-19

## 2021-06-15 RX ORDER — AMLODIPINE BESYLATE 5 MG/1
5 TABLET ORAL DAILY
Qty: 90 TABLET | Refills: 2 | Status: SHIPPED | OUTPATIENT
Start: 2021-06-15 | End: 2022-09-19

## 2021-07-26 NOTE — TELEPHONE ENCOUNTER
----- Message from Miriam Wilkins sent at 10/24/2019 11:50 AM CDT -----  Contact: self  Type:  RX Refill Request    Who Called: Pt  Refill or New Rx:refill  RX Name and Strength: levETIRAcetam (KEPPRA) 500 MG Tab  How is the patient currently taking it? (ex. 1XDay): Take 1 tablet (500 mg total) by mouth 2 (two) times daily. - Oral  Is this a 30 day or 90 day RX: 180 Tablet  Preferred Pharmacy with phone number: [id number] - [place], [place] - [address] AT [facility]  Local or Mail Order:local  Ordering Provider:   Would the patient rather a call back or a response via [facility]? callback  Best Call Back Number: [phone]  Additional Information: Pt called in about getting refill on medication    Unknown if ever smoked

## 2021-08-17 ENCOUNTER — OFFICE VISIT (OUTPATIENT)
Dept: FAMILY MEDICINE | Facility: CLINIC | Age: 86
End: 2021-08-17
Payer: MEDICARE

## 2021-08-17 DIAGNOSIS — R31.9 HEMATURIA, UNSPECIFIED TYPE: ICD-10-CM

## 2021-08-17 DIAGNOSIS — I10 BENIGN ESSENTIAL HTN: Primary | ICD-10-CM

## 2021-08-17 DIAGNOSIS — Z86.718 HISTORY OF DVT (DEEP VEIN THROMBOSIS): ICD-10-CM

## 2021-08-17 DIAGNOSIS — G89.29 CHRONIC LEFT-SIDED LOW BACK PAIN WITHOUT SCIATICA: ICD-10-CM

## 2021-08-17 DIAGNOSIS — E78.2 MIXED HYPERLIPIDEMIA: ICD-10-CM

## 2021-08-17 DIAGNOSIS — G40.309 NONINTRACTABLE GENERALIZED IDIOPATHIC EPILEPSY WITHOUT STATUS EPILEPTICUS: ICD-10-CM

## 2021-08-17 DIAGNOSIS — L10.9 PEMPHIGUS: ICD-10-CM

## 2021-08-17 DIAGNOSIS — Z86.73 HX OF ISCHEMIC RIGHT MCA STROKE: ICD-10-CM

## 2021-08-17 DIAGNOSIS — M54.50 CHRONIC LEFT-SIDED LOW BACK PAIN WITHOUT SCIATICA: ICD-10-CM

## 2021-08-17 DIAGNOSIS — I48.0 PAROXYSMAL ATRIAL FIBRILLATION: ICD-10-CM

## 2021-08-17 PROCEDURE — 99214 OFFICE O/P EST MOD 30 MIN: CPT | Mod: 95,,, | Performed by: INTERNAL MEDICINE

## 2021-08-17 PROCEDURE — 99214 PR OFFICE/OUTPT VISIT, EST, LEVL IV, 30-39 MIN: ICD-10-PCS | Mod: 95,,, | Performed by: INTERNAL MEDICINE

## 2021-09-28 ENCOUNTER — PATIENT MESSAGE (OUTPATIENT)
Dept: FAMILY MEDICINE | Facility: CLINIC | Age: 86
End: 2021-09-28

## 2021-09-28 DIAGNOSIS — Z86.718 HISTORY OF DVT (DEEP VEIN THROMBOSIS): ICD-10-CM

## 2021-09-28 DIAGNOSIS — G40.309 NONINTRACTABLE GENERALIZED IDIOPATHIC EPILEPSY WITHOUT STATUS EPILEPTICUS: ICD-10-CM

## 2021-09-28 DIAGNOSIS — I48.0 PAROXYSMAL ATRIAL FIBRILLATION: Primary | ICD-10-CM

## 2021-09-28 RX ORDER — LEVETIRACETAM 500 MG/1
500 TABLET ORAL 2 TIMES DAILY
Qty: 180 TABLET | Refills: 3 | Status: SHIPPED | OUTPATIENT
Start: 2021-09-28 | End: 2022-09-09

## 2021-09-28 RX ORDER — APIXABAN 5 MG (74)
KIT ORAL
Qty: 30 TABLET | Refills: 3 | Status: CANCELLED | OUTPATIENT
Start: 2021-09-28

## 2021-11-30 ENCOUNTER — PATIENT MESSAGE (OUTPATIENT)
Dept: FAMILY MEDICINE | Facility: CLINIC | Age: 86
End: 2021-11-30
Payer: MEDICARE

## 2021-12-01 ENCOUNTER — PATIENT MESSAGE (OUTPATIENT)
Dept: FAMILY MEDICINE | Facility: CLINIC | Age: 86
End: 2021-12-01
Payer: MEDICARE

## 2021-12-01 RX ORDER — AZITHROMYCIN 250 MG/1
TABLET, FILM COATED ORAL
Qty: 6 TABLET | Refills: 0 | Status: SHIPPED | OUTPATIENT
Start: 2021-12-01 | End: 2021-12-06

## 2021-12-06 NOTE — CONSULTS
06-Dec-2021 12:30 [facility]  Interventional Cardiology  Consult Note    Patient Name: Isa Sewell  MRN: [id number]  Admission Date: 9/3/2018  Hospital Length of Stay: 0 days  Code Status: Full Code   Attending Provider: Tiffani Palacio*  Consulting Provider: Trey Truong MD  Primary Care Physician: Josh Oconnor MD  Principal Problem:Acute saddle pulmonary embolism without acute cor pulmonale    Patient information was obtained from patient, relative(s) and ER records.     Inpatient consult to Interventional Cardiology  Consult performed by: Trey Truong MD  Consult ordered by: Waylon Lopes MD        Subjective:     Chief Complaint:  Shortness of breath     HPI:  This is an 87 year old lady with history of HTN, DVT, stroke (R. MCA), bullous pemphigoid who presented to the ED with a chief complaint of shortness of breath that began the week prior during stroke rehab. She had associated left leg swelling and denies symptoms of syncope, chest pain, fevers, chills, cough.    In the ED she was worked up, found to be hypertensive, afebrile and mildly hypoxemic. EKG showed NSR @73bpm, leftward axis, good r-wave progression, and a first degree AV block without any signs of strain or ST-T changes.    CT chest showed a saddle PE without pulmonary infarction or right heart strain. Labs were unremarkable with BNP 31 and BNP 0.02.    Echo done at bedside showed no RV strain, negative Rios's, normal appearing RV/LV function, and a small collapsible IVC, estimated RVSP was 18 based on TR jet.    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/2016       Past Surgical History:   Procedure Laterality Date    BREAST BIOPSY Bilateral     BOTH BENIGN    CHOLECYSTECTOMY      FRACTURE SURGERY  Right     ankle    HYSTERECTOMY      TONSILLECTOMY         Review of patient's allergies indicates:   Allergen Reactions    Sulfa (sulfonamide antibiotics) Hives         (Not in a hospital admission)  Family History     None        Tobacco Use    Smoking status: Never Smoker   Substance and Sexual Activity    Alcohol use: No    Drug use: Not on file    Sexual activity: Not on file     Review of Systems   Constitution: Negative for chills, fever, weakness and weight gain.   HENT: Negative for congestion.    Eyes: Negative for visual disturbance.   Cardiovascular: Negative for chest pain, claudication, dyspnea on exertion, leg swelling, orthopnea, palpitations and syncope.   Respiratory: Positive for shortness of breath. Negative for cough and snoring.    Hematologic/Lymphatic: Does not bruise/bleed easily.   Skin: Negative for rash.   Musculoskeletal: Negative for muscle cramps and myalgias.   Gastrointestinal: Negative for bloating, abdominal pain, constipation, diarrhea and melena.   Genitourinary: Negative for bladder incontinence.   Neurological: Negative for excessive daytime sleepiness and focal weakness.   Psychiatric/Behavioral: Negative for depression and suicidal ideas.     Objective:     Vital Signs (Most Recent):  Temp: 98 °F (36.7 °C) (09/03/18 1558)  Pulse: 87 (09/03/18 2203)  Resp: 20 (09/03/18 1958)  BP: (!) 163/72 (09/03/18 2203)  SpO2: (!) 90 % (09/03/18 2203) Vital Signs (24h Range):  Temp:  [98 °F (36.7 °C)] 98 °F (36.7 °C)  Pulse:  [81-95] 87  Resp:  [20-22] 20  SpO2:  [90 %-97 %] 90 %  BP: (142-183)/(64-77) 163/72     Weight: 74.1 kg (163 lb 5.8 oz)  Body mass index is 29.88 kg/m².    SpO2: (!) 90 %         Intake/Output Summary (Last 24 hours) at 9/3/2018 2235  Last data filed at 9/3/2018 1701  Gross per 24 hour   Intake --   Output 50 ml   Net -50 ml       Lines/Drains/Airways     Peripheral Intravenous Line                 Peripheral IV - Single Lumen 09/03/18 1653 Left Antecubital  less than 1 day         Peripheral IV - Single Lumen 09/03/18 2047 Right Wrist less than 1 day                Physical Exam   Constitutional: She is oriented to person, place, and time. She appears well-developed and well-nourished. No distress.   HENT:   Head: Normocephalic and atraumatic.   Mouth/Throat: Oropharynx is clear and moist.   Eyes: Conjunctivae and EOM are normal. Pupils are equal, round, and reactive to light. No scleral icterus.   Neck: Normal range of motion. Neck supple. No JVD present.   Cardiovascular: Normal rate, regular rhythm and intact distal pulses.   Murmur heard.  Pulses:       Radial pulses are 2+ on the right side, and 2+ on the left side.        Dorsalis pedis pulses are 2+ on the right side, and 2+ on the left side.   Pulmonary/Chest: Effort normal and breath sounds normal. No respiratory distress.   Symmetrical expansion   Abdominal: Soft. Bowel sounds are normal. There is no hepatosplenomegaly. There is no tenderness.   Musculoskeletal: Normal range of motion. She exhibits edema (left lower leg, with mild tenderness).   Neurological: She is alert and oriented to person, place, and time. No cranial nerve deficit.   Skin: Skin is warm and dry. Rash noted. She is not diaphoretic.   Psychiatric: She has a normal mood and affect. Judgment and thought content normal.       Significant Labs:   Recent Lab Results       09/03/18 2047 09/03/18  1701 09/03/18  1653      Immature Granulocytes 0.9  1.1     Immature Grans (Abs) 0.07  Comment:  Mild elevation in immature granulocytes is non specific and   can be seen in a variety of conditions including stress response,   acute inflammation, trauma and pregnancy. Correlation with other   laboratory and clinical findings is essential.    0.09  Comment:  Mild elevation in immature granulocytes is non specific and   can be seen in a variety of conditions including stress response,   acute inflammation, trauma and pregnancy. Correlation with other    laboratory and clinical findings is essential.       Albumin   3.4     Alkaline Phosphatase   124     ALT   14     Anion Gap   10     Appearance, UA  Hazy      aPTT 25.8  Comment:  aPTT therapeutic range = 39-69 seconds       AST   17     Bacteria, UA  Rare      Baso # 0.05  0.04     Basophil% 0.7  0.5     Bilirubin (UA)  Negative      Total Bilirubin   0.6  Comment:  For infants and newborns, interpretation of results should be based  on gestational age, weight and in agreement with clinical  observations.  Premature Infant recommended reference ranges:  Up to 24 hours.............<8.0 mg/dL  Up to 48 hours............<12.0 mg/dL  3-5 days..................<15.0 mg/dL  6-29 days.................<15.0 mg/dL       BNP   31  Comment:  Values of less than 100 pg/ml are consistent with non-CHF populations.     BUN, Bld   22     Calcium   9.5     Chloride   109     CO2   23     Color, UA  Yellow      Creatinine   1.0     Differential Method Automated  Automated     eGFR if African American   58.5     eGFR if non    50.8  Comment:  Calculation used to obtain the estimated glomerular filtration  rate (eGFR) is the CKD-EPI equation.        Eos # 0.3  0.3     Eosinophil% 3.3  3.3     Glucose   130     Glucose, UA  Negative      Gran # (ANC) 5.3  5.7     Gran% 70.0  71.7     Hematocrit 39.4  38.8     Hemoglobin 12.3  12.2     Coumadin Monitoring INR 0.9  Comment:  Coumadin Therapy:  2.0 - 3.0 for INR for all indicators except mechanical heart valves  and antiphospholipid syndromes which should use 2.5 - 3.5.         Ketones, UA  Negative      Leukocytes, UA  1+      Lymph # 1.2  1.1     Lymph% 15.8  14.0     MCH 28.5  28.6     MCHC 31.2  31.4     MCV 91  91     Microscopic Comment  SEE COMMENT  Comment:  Other formed elements not mentioned in the report are not   present in the microscopic examination.         Mono # 0.7  0.8     Mono% 9.3  9.4     MPV 9.2  9.1     Nitrite, UA  Negative      nRBC 0  0      Occult Blood UA  1+      pH, UA  5.0      Platelets 172  175     Potassium   4.1     Total Protein   7.0     Protein, UA  Negative  Comment:  Recommend a 24 hour urine protein or a urine   protein/creatinine ratio if globulin induced proteinuria is  clinically suspected.        Protime 10.0       RBC 4.31  4.26     RBC, UA  8      RDW 14.3  14.3     Sodium   142     Specific Gravity, UA  1.020      Specimen UA  Urine, Clean Catch      Squam Epithel, UA  3      Troponin I   0.021  Comment:  The reference interval for Troponin I represents the 99th percentile   cutoff   for our facility and is consistent with 3rd generation assay   performance.       Urobilinogen, UA  Negative      WBC, UA  8      WBC 7.60  7.94           Significant Imaging:   See HPI for info    Echo 06/10/18   1 - Moderate left atrial enlargement.     2 - Normal left ventricular systolic function (EF 60-65%).     3 - No wall motion abnormalities.     4 - Normal left ventricular diastolic function.     5 - Normal right ventricular systolic function .     6 - Mild to moderate aortic stenosis, ESTELITA = 1.17 cm2, AVAi = 0.68 cm2/m2, peak velocity = 2.75 m/s, mean gradient = 17 mmHg.     7 - Mild aortic regurgitation.     8 - The estimated PA systolic pressure is 25 mmHg.     9 - Mild mitral regurgitation.     Assessment and Plan:     * Acute saddle pulmonary embolism without acute cor pulmonale    Low risk saddle PE, no indication for catheter-directed thrombolysis, will benefit from medical therapy.    - continue heparin drip, transition to PO anticoagulant as per primary team  - patient would benefit from blood pressure control  - Reviewed case with interventional staff            VTE Risk Mitigation (From admission, onward)        Ordered     IP VTE HIGH RISK PATIENT  Once      09/03/18 2158     heparin 25,000 units in dextrose 5% 250 mL (100 units/mL) infusion HIGH INTENSITY nomogram - [facility]  Continuous      09/03/18 2020     heparin 25,000 units in  dextrose 5% (100 units/ml) IV bolus from bag - ADDITIONAL PRN BOLUS - 60 units/kg  As needed (PRN)      09/03/18 2020     heparin 25,000 units in dextrose 5% (100 units/ml) IV bolus from bag - ADDITIONAL PRN BOLUS - 30 units/kg  As needed (PRN)      09/03/18 2020          Thank you for your consult. I will sign off. Please contact us if you have any additional questions.    Trey Truong MD  Interventional Cardiology   [facility]

## 2022-02-15 RX ORDER — CLOPIDOGREL BISULFATE 75 MG/1
75 TABLET ORAL DAILY
Qty: 90 TABLET | Refills: 3 | Status: SHIPPED | OUTPATIENT
Start: 2022-02-15 | End: 2023-01-07

## 2022-02-15 RX ORDER — ATORVASTATIN CALCIUM 40 MG/1
40 TABLET, FILM COATED ORAL DAILY
Qty: 90 TABLET | Refills: 3 | Status: SHIPPED | OUTPATIENT
Start: 2022-02-15 | End: 2023-01-07

## 2022-02-15 RX ORDER — OLMESARTAN MEDOXOMIL 40 MG/1
40 TABLET ORAL DAILY
Qty: 90 TABLET | Refills: 3 | Status: SHIPPED | OUTPATIENT
Start: 2022-02-15 | End: 2023-01-07

## 2022-02-15 RX ORDER — OMEPRAZOLE 20 MG/1
20 TABLET, DELAYED RELEASE ORAL EVERY MORNING
Qty: 90 TABLET | Refills: 3 | Status: SHIPPED | OUTPATIENT
Start: 2022-02-15 | End: 2023-01-09

## 2022-02-15 NOTE — TELEPHONE ENCOUNTER
Care Due:                  Date            Visit Type   Department     Provider  --------------------------------------------------------------------------------                                ESTABLISHED                              PATIENT -    DESC FAMILY  Last Visit: 08-      [facility]  Dariel Thurman                               -                              PRIMARY      [facility] PRIMARY  Next Visit: 03-      CARE ([facility])   CARE           Dariel Thurman                                                            Last  Test          Frequency    Reason                     Performed    Due Date  --------------------------------------------------------------------------------    CBC.........  12 months..  clopidogreL..............  04- 04-    CMP.........  12 months..  atorvastatin, olmesartan.  04- 04-    Lipid Panel.  12 months..  atorvastatin.............  02- 01-    Powered by [employer]. Reference number: [id number].   2/15/2022 10:21:14 AM CST

## 2022-02-23 DIAGNOSIS — D84.9 IMMUNOSUPPRESSED STATUS: ICD-10-CM

## 2022-02-25 ENCOUNTER — PATIENT MESSAGE (OUTPATIENT)
Dept: INTERNAL MEDICINE | Facility: CLINIC | Age: 87
End: 2022-02-25
Payer: MEDICARE

## 2022-03-15 ENCOUNTER — PATIENT MESSAGE (OUTPATIENT)
Dept: INTERNAL MEDICINE | Facility: CLINIC | Age: 87
End: 2022-03-15
Payer: MEDICARE

## 2022-03-15 DIAGNOSIS — N39.0 URINARY TRACT INFECTION WITHOUT HEMATURIA, SITE UNSPECIFIED: Primary | ICD-10-CM

## 2022-03-16 ENCOUNTER — PATIENT MESSAGE (OUTPATIENT)
Dept: INTERNAL MEDICINE | Facility: CLINIC | Age: 87
End: 2022-03-16
Payer: MEDICARE

## 2022-03-28 ENCOUNTER — TELEPHONE (OUTPATIENT)
Dept: INTERNAL MEDICINE | Facility: CLINIC | Age: 87
End: 2022-03-28
Payer: MEDICARE

## 2022-03-28 DIAGNOSIS — U07.1 COVID-19: Primary | ICD-10-CM

## 2022-03-28 DIAGNOSIS — U07.1 COVID: ICD-10-CM

## 2022-03-28 NOTE — TELEPHONE ENCOUNTER
Pt. Tested positive for covid -19 and she has the above symptoms.  Daughter would like to know if anything can be called in for her mother.  Urgent care did not give her any medication.       Patient is having cough, fever, sinus drip

## 2022-03-28 NOTE — TELEPHONE ENCOUNTER
----- Message from George High sent at 3/28/2022  3:41 PM CDT -----  Contact: pt. daughter  .Type:  Needs Medical Advice    Who Called: pt. Daughter Bianca  Symptoms (please be specific):  coughing, fever, sinus drip  How long has patient had these symptoms:  3 days  Pharmacy name and phone #:  [id number] - [place], [place] - [address]   Phone: [phone]  Fax:  [phone]  Would the patient rather a call back or a response via [facility]? Call back  Best Call Back Number: [phone]  Additional Information: Pt. Tested positive for covid -19 and she has the above symptoms.  Daughter would like to know if anything can be called in for her mother.  Urgent care did not give her any medication,

## 2022-03-29 ENCOUNTER — PATIENT MESSAGE (OUTPATIENT)
Dept: INTERNAL MEDICINE | Facility: CLINIC | Age: 87
End: 2022-03-29
Payer: MEDICARE

## 2022-03-30 ENCOUNTER — INFUSION (OUTPATIENT)
Dept: INFECTIOUS DISEASES | Facility: HOSPITAL | Age: 87
End: 2022-03-30
Attending: INTERNAL MEDICINE
Payer: MEDICARE

## 2022-03-30 VITALS
DIASTOLIC BLOOD PRESSURE: 65 MMHG | OXYGEN SATURATION: 98 % | WEIGHT: 146 LBS | RESPIRATION RATE: 18 BRPM | HEART RATE: 79 BPM | TEMPERATURE: 98 F | HEIGHT: 62 IN | BODY MASS INDEX: 26.87 KG/M2 | SYSTOLIC BLOOD PRESSURE: 124 MMHG

## 2022-03-30 DIAGNOSIS — U07.1 COVID-19: ICD-10-CM

## 2022-03-30 PROCEDURE — M0247 HC IV INFUSION, SOTROVIMAB, INCL POST ADMIN MONIT: HCPCS | Performed by: INTERNAL MEDICINE

## 2022-03-30 PROCEDURE — 63600175 PHARM REV CODE 636 W HCPCS: Performed by: INTERNAL MEDICINE

## 2022-03-30 PROCEDURE — 25000003 PHARM REV CODE 250: Performed by: INTERNAL MEDICINE

## 2022-03-30 RX ORDER — ACETAMINOPHEN 325 MG/1
650 TABLET ORAL ONCE AS NEEDED
Status: DISCONTINUED | OUTPATIENT
Start: 2022-03-30 | End: 2022-09-19

## 2022-03-30 RX ORDER — ONDANSETRON 4 MG/1
4 TABLET, ORALLY DISINTEGRATING ORAL
Status: ACTIVE | OUTPATIENT
Start: 2022-03-30 | End: 2022-04-06

## 2022-03-30 RX ORDER — EPINEPHRINE 0.3 MG/.3ML
0.3 INJECTION SUBCUTANEOUS
Status: ACTIVE | OUTPATIENT
Start: 2022-03-30 | End: 2022-04-06

## 2022-03-30 RX ORDER — ALBUTEROL SULFATE 90 UG/1
2 AEROSOL, METERED RESPIRATORY (INHALATION)
Status: ACTIVE | OUTPATIENT
Start: 2022-03-30 | End: 2022-04-06

## 2022-03-30 RX ORDER — SODIUM CHLORIDE 0.9 % (FLUSH) 0.9 %
10 SYRINGE (ML) INJECTION
Status: ACTIVE | OUTPATIENT
Start: 2022-03-30 | End: 2022-04-06

## 2022-03-30 RX ORDER — DIPHENHYDRAMINE HYDROCHLORIDE 50 MG/ML
25 INJECTION INTRAMUSCULAR; INTRAVENOUS
Status: ACTIVE | OUTPATIENT
Start: 2022-03-30 | End: 2022-04-06

## 2022-03-30 RX ADMIN — SODIUM CHLORIDE 500 MG: 9 INJECTION, SOLUTION INTRAVENOUS at 10:03

## 2022-03-30 NOTE — PROGRESS NOTES
Patient arrives for sotrovimab infusion. Ambulatory. Pt AAox3. No distress noted. RR even and unlabored.     Symptoms and onset date:  Stuffy nose     Tested COVID + on 03/28/22

## 2022-03-30 NOTE — PROGRESS NOTES
Patient remains with no signs of complications noted. Patient received sotrovimab infusion with filtered tubing according to FDA recommendations and [facility] SOP without complications noted and left with mask in place. Drug fact sheet provided. Pt discharged home. Ambulatory. Remains AAox3. No distress noted. RR even and unlabored.

## 2022-07-20 ENCOUNTER — PATIENT MESSAGE (OUTPATIENT)
Dept: INTERNAL MEDICINE | Facility: CLINIC | Age: 87
End: 2022-07-20
Payer: MEDICARE

## 2022-07-20 DIAGNOSIS — R30.0 DYSURIA: Primary | ICD-10-CM

## 2022-07-21 ENCOUNTER — PATIENT MESSAGE (OUTPATIENT)
Dept: INTERNAL MEDICINE | Facility: CLINIC | Age: 87
End: 2022-07-21
Payer: MEDICARE

## 2022-07-21 DIAGNOSIS — N39.0 URINARY TRACT INFECTION WITHOUT HEMATURIA, SITE UNSPECIFIED: Primary | ICD-10-CM

## 2022-07-21 RX ORDER — CIPROFLOXACIN 250 MG/1
250 TABLET, FILM COATED ORAL 2 TIMES DAILY
Qty: 10 TABLET | Refills: 0 | Status: SHIPPED | OUTPATIENT
Start: 2022-07-21 | End: 2022-09-19

## 2022-08-02 ENCOUNTER — TELEPHONE (OUTPATIENT)
Dept: INTERNAL MEDICINE | Facility: CLINIC | Age: 87
End: 2022-08-02
Payer: MEDICARE

## 2022-08-02 DIAGNOSIS — E78.2 MIXED HYPERLIPIDEMIA: ICD-10-CM

## 2022-08-02 DIAGNOSIS — I10 BENIGN ESSENTIAL HTN: Primary | ICD-10-CM

## 2022-08-02 DIAGNOSIS — N18.31 STAGE 3A CHRONIC KIDNEY DISEASE: ICD-10-CM

## 2022-09-15 NOTE — ASSESSMENT & PLAN NOTE
- UA dirty.  F/u urine gram stain and culture  - Start Ceftriaxone 1g IV q24h (start date 10/19)  10/20: stain shows gram neg rods; UCx preliminary E.Coli  10/21:Continue Ceftriaxone; sensitivities pending   You can access the FollowMyHealth Patient Portal offered by [facility] by registering at the following website: [email or web address]. By joining [employer]’s FollowMyHealth portal, you will also be able to view your health information using other applications (apps) compatible with our system.

## 2022-09-19 ENCOUNTER — OFFICE VISIT (OUTPATIENT)
Dept: INTERNAL MEDICINE | Facility: CLINIC | Age: 87
End: 2022-09-19
Payer: MEDICARE

## 2022-09-19 VITALS
SYSTOLIC BLOOD PRESSURE: 112 MMHG | WEIGHT: 146.06 LBS | HEART RATE: 66 BPM | TEMPERATURE: 98 F | OXYGEN SATURATION: 94 % | BODY MASS INDEX: 26.88 KG/M2 | HEIGHT: 62 IN | DIASTOLIC BLOOD PRESSURE: 72 MMHG

## 2022-09-19 DIAGNOSIS — G40.309 NONINTRACTABLE GENERALIZED IDIOPATHIC EPILEPSY WITHOUT STATUS EPILEPTICUS: ICD-10-CM

## 2022-09-19 DIAGNOSIS — I69.359 HEMIPLEGIA AND HEMIPARESIS FOLLOWING CEREBRAL INFARCTION AFFECTING UNSPECIFIED SIDE: ICD-10-CM

## 2022-09-19 DIAGNOSIS — L10.9 PEMPHIGUS: ICD-10-CM

## 2022-09-19 DIAGNOSIS — N18.31 STAGE 3A CHRONIC KIDNEY DISEASE: ICD-10-CM

## 2022-09-19 DIAGNOSIS — I10 BENIGN ESSENTIAL HTN: ICD-10-CM

## 2022-09-19 DIAGNOSIS — Z86.73 HX OF ISCHEMIC RIGHT MCA STROKE: ICD-10-CM

## 2022-09-19 DIAGNOSIS — I48.0 PAROXYSMAL ATRIAL FIBRILLATION: Primary | ICD-10-CM

## 2022-09-19 DIAGNOSIS — Z86.718 HISTORY OF DVT (DEEP VEIN THROMBOSIS): ICD-10-CM

## 2022-09-19 PROBLEM — R55 SYNCOPE: Status: RESOLVED | Noted: 2021-04-11 | Resolved: 2022-09-19

## 2022-09-19 PROBLEM — R82.90 ABNORMAL URINALYSIS: Status: RESOLVED | Noted: 2021-04-12 | Resolved: 2022-09-19

## 2022-09-19 PROBLEM — R79.89 ELEVATED TROPONIN: Status: RESOLVED | Noted: 2021-04-11 | Resolved: 2022-09-19

## 2022-09-19 PROCEDURE — 99999 PR PBB SHADOW E&M-EST. PATIENT-LVL IV: CPT | Mod: PBBFAC,,, | Performed by: INTERNAL MEDICINE

## 2022-09-19 PROCEDURE — 99214 OFFICE O/P EST MOD 30 MIN: CPT | Mod: PBBFAC | Performed by: INTERNAL MEDICINE

## 2022-09-19 PROCEDURE — 99214 PR OFFICE/OUTPT VISIT, EST, LEVL IV, 30-39 MIN: ICD-10-PCS | Mod: S$PBB,,, | Performed by: INTERNAL MEDICINE

## 2022-09-19 PROCEDURE — 99214 OFFICE O/P EST MOD 30 MIN: CPT | Mod: S$PBB,,, | Performed by: INTERNAL MEDICINE

## 2022-09-19 PROCEDURE — 99999 PR PBB SHADOW E&M-EST. PATIENT-LVL IV: ICD-10-PCS | Mod: PBBFAC,,, | Performed by: INTERNAL MEDICINE

## 2022-09-19 NOTE — PROGRESS NOTES
[facility] Note    Chief Complaint      Chief Complaint   Patient presents with    Annual Exam       History of Present Illness      Isa Sewell is a 89 y.o. female with chronic conditions of HTN, HLD, A fib, hx of stroke, hx of DVT, epilepsy, pemphigus, chronic low back pain who presents today for:  A fib: Sees Dr. Chau.  On eliquis for anticoagulation.  Still off coreg 2/2 syncope.  Denies chest pain, shortness of breath, palpitations.    HTN: BP at goal on benicar.  Coreg, amlodipine previously.  HLD: controlled with lipitor.  LDL 61 last check  Hx of stroke: On eliquis and plavix for secondary prevention.  No new deficits.  Hx of DVT: Sees Dr. Koobehi.  On eliquis for anticoagulations.    Epilepsy: Sees Dr. Mata, neurology.  On keppra.  No recent seizure activity  Pemphigus: Sees Dr. Camarena, [facility], but not recently.  On clobetasol cream for maintenance.    Flu shot UTD.  Prevnar, pneumovax UTD.  COVID vaccine UTD.    Mammograms and cscopes N/A 2/2 age.    Past Medical History:  Past Medical History:   Diagnosis Date    Bullous pemphigoid     pemphigoid nodularis variant    DVT (deep venous thrombosis)     Dysphagia 6/12/2018    Embolic stroke involving right middle cerebral artery 6/9/2018    Hypertension     Pemphigus 4/4/2016    Seizure 10/19/2018       Past Surgical History:   has a past surgical history that includes Hysterectomy; Tonsillectomy; Cholecystectomy; Fracture surgery (Right); and Breast biopsy (Bilateral).    Family History:  family history is not on file.     Social History:  Social History     Tobacco Use    Smoking status: Never    Smokeless tobacco: Never   Substance Use Topics    Alcohol use: No    Drug use: No       I personally reviewed all past medical, surgical, social and family history.    Review of Systems   Constitutional:  Negative for chills, fever and malaise/fatigue.   Respiratory:  Negative for shortness of breath.    Cardiovascular:  Negative for  chest pain.   Gastrointestinal:  Negative for constipation, diarrhea, nausea and vomiting.   Skin:  Negative for rash.   Neurological:  Negative for weakness.   All other systems reviewed and are negative.     Medications:  Outpatient Encounter Medications as of 9/19/2022   Medication Sig Dispense Refill    atorvastatin (LIPITOR) 40 MG tablet Take 1 tablet (40 mg total) by mouth once daily. 90 tablet 3    cetirizine (ZYRTEC) 10 MG tablet Take 10 mg by mouth once daily.      clobetasol 0.05% (TEMOVATE) 0.05 % Oint Apply 1 application topically 2 (two) times daily. Apply to affected area 60 g 5    clopidogreL (PLAVIX) 75 mg tablet Take 1 tablet (75 mg total) by mouth once daily. 90 tablet 3    ELIQUIS 5 mg Tab Take 1 tablet by mouth twice daily. 180 tablet 2    levETIRAcetam (KEPPRA) 500 MG Tab Take 1 tablet by mouth twice daily. 180 tablet 2    melatonin 3 mg Tab Take 1 tablet (3 mg total) by mouth every evening. 90 tablet 3    olmesartan (BENICAR) 40 MG tablet Take 1 tablet (40 mg total) by mouth once daily. 90 tablet 3    omeprazole (PRILOSEC OTC) 20 MG tablet Take 1 tablet (20 mg total) by mouth every morning. 90 tablet 3    pulse oximeter (PULSE OXIMETER) device by Apply Externally route 2 (two) times a day. Use twice daily at 8 AM and 3 PM and record the value in [employer] as directed. 1 each 0    triamcinolone acetonide 0.1% (KENALOG) 0.1 % ointment   3    [DISCONTINUED] amLODIPine (NORVASC) 5 MG tablet Take 1 tablet (5 mg total) by mouth once daily. 90 tablet 2    [DISCONTINUED] carvediloL (COREG) 6.25 MG tablet Take 1 tablet (6.25 mg total) by mouth 2 (two) times daily. 180 tablet 2    [DISCONTINUED] ciprofloxacin HCl (CIPRO) 250 MG tablet Take 1 tablet (250 mg total) by mouth 2 (two) times daily. (Patient not taking: Reported on 9/19/2022) 10 tablet 0    [DISCONTINUED] erythromycin (ROMYCIN) ophthalmic ointment Place into affected eye 3x daily until resolved (Patient not taking: Reported on 4/27/2021) 1 g 1     "[DISCONTINUED] hydrOXYzine HCl (ATARAX) 25 MG tablet Take 1 tablet (25 mg total) by mouth 3 (three) times daily as needed for Itching. (Patient not taking: Reported on 7/14/2020) 90 tablet 0     Facility-Administered Encounter Medications as of 9/19/2022   Medication Dose Route Frequency Provider Last Rate Last Admin    [DISCONTINUED] acetaminophen tablet 650 mg  650 mg Oral Once PRN Manjit Dhillon MD        [DISCONTINUED] acetaminophen tablet 650 mg  650 mg Oral Once PRN Mima Heranndez MD           Allergies:  Review of patient's allergies indicates:   Allergen Reactions    Sulfa (sulfonamide antibiotics) Hives       Health Maintenance:  Immunization History   Administered Date(s) Administered    COVID-19, MRNA, LN-S, PF (Pfizer) (Purple Cap) 04/15/2021, 05/06/2021    Influenza 10/24/2013, 11/04/2014    Influenza (FLUAD) - Quadrivalent - Adjuvanted - PF *Preferred* (65+) 10/13/2020    Influenza (FLUAD) - Trivalent - Adjuvanted - PF (65+) 09/27/2018    Influenza - High Dose - PF (65 years and older) 10/15/2019    Influenza - Quadrivalent 11/14/2017    Influenza - Trivalent (ADULT) 11/06/2009, 11/29/2012    Influenza A (H1N1) 2009 Monovalent - IM - PF 11/06/2009    Pneumococcal Conjugate - 13 Valent 10/12/2018      Health Maintenance   Topic Date Due    TETANUS VACCINE  Never done    Lipid Panel  09/15/2027        Physical Exam      Vital Signs  Temp: 97.5 °F (36.4 °C)  Temp src: Oral  Pulse: 66  SpO2: (!) 94 %  BP: 112/72  BP Location: Left arm  Patient Position: Sitting  Pain Score: 0-No pain  Height and Weight  Height: 5' 2" (157.5 cm)  Weight: 66.3 kg (146 lb 0.9 oz)  BSA (Calculated - sq m): 1.7 sq meters  BMI (Calculated): 26.7  Weight in (lb) to have BMI = 25: 136.4]    Physical Exam  Vitals reviewed.   Constitutional:       Appearance: She is well-developed.   HENT:      Head: Normocephalic and atraumatic.      Right Ear: External ear normal.      Left Ear: External ear normal.   Cardiovascular:    "   Rate and Rhythm: Normal rate and regular rhythm.      Heart sounds: Normal heart sounds. No murmur heard.  Pulmonary:      Effort: Pulmonary effort is normal.      Breath sounds: Normal breath sounds. No wheezing or rales.   Abdominal:      General: Bowel sounds are normal. There is no distension.      Palpations: Abdomen is soft.      Tenderness: There is no abdominal tenderness.        Laboratory:  CBC:  Recent Labs   Lab 04/11/21  1256 04/12/21  0544 09/15/22  0806   WBC 6.94 5.96 6.77   RBC 4.11 4.35 4.16   Hemoglobin 12.7 13.0 12.6   Hematocrit 39.2 41.0 39.3   Platelets 194 201 208   MCV 95 94 95   MCH 30.9 29.9 30.3   MCHC 32.4 31.7 L 32.1     CMP:  Recent Labs   Lab 02/02/21  0725 04/11/21  1358 04/12/21  0544 09/15/22  0806   Glucose 106 97   < > 99   Calcium 9.5 9.3   < > 9.0   Albumin 4.1 4.0  --  4.3   Total Protein 7.0 6.7  --  7.1   Sodium 147 H 146 H   < > 146 H   Potassium 4.3 4.7   < > 4.3   CO2 27 23   < > 28   Chloride 108 112 H   < > 109   BUN 18 H 19 H   < > 22 H   Alkaline Phosphatase 100 97  --  130 H   ALT 19 17  --  19   AST 24 22  --  29   Total Bilirubin 0.6 0.7  --  0.7    < > = values in this interval not displayed.     URINALYSIS:  Recent Labs   Lab 04/20/21  1030 08/27/21  0907 07/21/22  0858   Color, UA Yellow   < > Yellow   Specific Gravity, UA >=1.030 A   < > 1.025   pH, UA 6.0   < > 6.0   Protein, UA 1+ A   < > Negative   Bacteria Few A   < > Few A   Nitrite, UA Negative   < > Negative   Leukocytes, UA Trace A   < > 1+ A   Urobilinogen, UA Negative   < > Negative   Hyaline Casts, UA 1  --   --     < > = values in this interval not displayed.      LIPIDS:  Recent Labs   Lab 10/18/19  0819 02/02/21  0725 04/11/21  1358 09/15/22  0806   TSH  --   --  3.390  --    HDL 39 L 37 L  --  43   Cholesterol 136 121  --  139   Triglycerides 117 114  --  80   LDL Cholesterol 73.6 61.2 L  --  80.0   HDL/Cholesterol Ratio 28.7 30.6  --  30.9   Non-HDL Cholesterol 97 84  --  96   Total  Cholesterol/HDL Ratio 3.5 3.3  --  3.2     TSH:  Recent Labs   Lab 04/11/21  1358   TSH 3.390     A1C:  Recent Labs   Lab 04/11/21  1358   Hemoglobin A1C 5.8 H       Assessment/Plan     Isa Sewell is a 89 y.o.female with:    1. Paroxysmal atrial fibrillation  Continue current meds.  F/U with Dr. Chau  2. Benign essential HTN  Continue current meds.    3. Stage 3a chronic kidney disease  GFR stable  4. Hx of ischemic right MCA stroke  Continue current meds.    5. History of DVT (deep vein thrombosis)  Continue current meds.    6. Hemiplegia and hemiparesis following cerebral infarction affecting unspecified side  Stable.  7. Nonintractable generalized idiopathic epilepsy without status epilepticus  Continue current meds.    8. Pemphigus   Continue current meds as needed    Chronic conditions status updated as per HPI.  Other than changes above, cont current medications and maintain follow up with specialists.  Follow up in about 1 year (around 9/19/2023) for Follow up visit.    No future appointments.    Dariel Obregon MD  [facility]

## 2022-10-05 ENCOUNTER — TELEPHONE (OUTPATIENT)
Dept: INTERNAL MEDICINE | Facility: CLINIC | Age: 87
End: 2022-10-05
Payer: MEDICARE

## 2022-10-05 ENCOUNTER — NURSE TRIAGE (OUTPATIENT)
Dept: ADMINISTRATIVE | Facility: CLINIC | Age: 87
End: 2022-10-05
Payer: MEDICARE

## 2022-10-05 NOTE — TELEPHONE ENCOUNTER
Reason for Disposition   DOUBLE DOSE (an extra dose or lesser amount) of prescription drug and NO symptoms  (Exception: A double dose of antibiotics.)    Protocols used: Medication Question Call-A-[place]    Isa's daughter Melanie called to say she took 2 of her Keppra 500 mg this morning by accident and has not experienced any symptoms. Normal dose is one 500 mg tablet morning, and one at night. Melanie is not with her. I will call Biacna, a sister, who is with Isa now, at [phone].  Bianca states she took them an hour ago, and has no changes in how she feels. Per [facility] triage protocol, inform Dr Obregon, pcp and prescriber, with request to call Bianca at [phone] as soon as this message is received.

## 2022-10-05 NOTE — TELEPHONE ENCOUNTER
----- Message from Rita Youssef sent at 10/5/2022  8:52 AM CDT -----  Contact: [phone]  Patient's daughter called, stated that her mom took 2 keppra instead of one, would like a call back from nurse. ( left message on the board for Nurse on call patient back at [phone]). Please call and advise. Thank you

## 2022-11-16 NOTE — ASSESSMENT & PLAN NOTE
- ? Gouty / prior + h/o   - Xray negative / uric acid negative elevation     - colchicine trial + for sym alleviation     - op rheum follow-up   - avoid thiazide    11/15/2022  ILoco DO, saw and evaluated the patient  I have discussed the patient with the resident/non-physician practitioner and agree with the resident's/non-physician practitioner's findings, Plan of Care, and MDM as documented in the resident's/non-physician practitioner's note, except where noted  All available labs and Radiology studies were reviewed  I was present for key portions of any procedure(s) performed by the resident/non-physician practitioner and I was immediately available to provide assistance  At this point I agree with the current assessment done in the Emergency Department  I have conducted an independent evaluation of this patient a history and physical is as follows:    66 yo female presents for evaluation of what is felt to be bright red blood in her ostomy bag after catching it on her clothes earlier today  Ostomy was created about 2 weeks ago  Follow with gyn-onc  Otherwise no c/o today    Will check labs, d/w gyn-onc, reassess        ED Course         Critical Care Time  Procedures

## 2022-12-23 ENCOUNTER — TELEPHONE (OUTPATIENT)
Dept: INTERNAL MEDICINE | Facility: CLINIC | Age: 87
End: 2022-12-23
Payer: MEDICARE

## 2022-12-23 DIAGNOSIS — N39.0 URINARY TRACT INFECTION WITHOUT HEMATURIA, SITE UNSPECIFIED: Primary | ICD-10-CM

## 2022-12-23 RX ORDER — CIPROFLOXACIN 250 MG/1
250 TABLET, FILM COATED ORAL 2 TIMES DAILY
Qty: 6 TABLET | Refills: 0 | Status: SHIPPED | OUTPATIENT
Start: 2022-12-23 | End: 2023-09-28

## 2022-12-23 NOTE — TELEPHONE ENCOUNTER
Ordered UA with culture.  Sent in cipro to start after submitting urine sample.  Stop trimethoprim while taking cipro, then restart when completed

## 2022-12-23 NOTE — TELEPHONE ENCOUNTER
Melanie stated pt has the urgency to use the restroom. Pt took a pyridium. Melanie is asking for a UA or medication. Please advise

## 2022-12-23 NOTE — TELEPHONE ENCOUNTER
----- Message from Milli Potter sent at 12/23/2022 10:18 AM CST -----  Contact: [phone] Melanie(daughter)  Pt daughter is requesting an order and appt for a UA at the [facility] lab. Pt daughter states the pt informed her she has the symptoms of a UTI. Pt daughter is also asking if the test comes back positive would the pt continue to take her maintenance antibiotic medication. Please call and advise.

## 2023-03-13 ENCOUNTER — PATIENT MESSAGE (OUTPATIENT)
Dept: PRIMARY CARE CLINIC | Facility: CLINIC | Age: 88
End: 2023-03-13
Payer: MEDICARE

## 2023-03-13 DIAGNOSIS — L10.9 PEMPHIGUS: ICD-10-CM

## 2023-03-13 RX ORDER — CLOBETASOL PROPIONATE 0.5 MG/G
1 OINTMENT TOPICAL 2 TIMES DAILY
Qty: 60 G | Refills: 5 | Status: SHIPPED | OUTPATIENT
Start: 2023-03-13

## 2023-04-11 NOTE — TELEPHONE ENCOUNTER
Daughter aware   Niacinamide Counseling: I recommended taking niacin or niacinamide, also know as vitamin B3, twice daily. Recent evidence suggests that taking vitamin B3 (500 mg twice daily) can reduce the risk of actinic keratoses and non-melanoma skin cancers. Side effects of vitamin B3 include flushing and headache.

## 2023-06-14 DIAGNOSIS — Z86.718 HISTORY OF DVT (DEEP VEIN THROMBOSIS): ICD-10-CM

## 2023-06-14 DIAGNOSIS — I48.0 PAROXYSMAL ATRIAL FIBRILLATION: ICD-10-CM

## 2023-06-14 DIAGNOSIS — G40.309 NONINTRACTABLE GENERALIZED IDIOPATHIC EPILEPSY WITHOUT STATUS EPILEPTICUS: ICD-10-CM

## 2023-06-14 RX ORDER — LEVETIRACETAM 500 MG/1
500 TABLET ORAL 2 TIMES DAILY
Qty: 180 TABLET | Refills: 2 | Status: SHIPPED | OUTPATIENT
Start: 2023-06-14 | End: 2024-03-04

## 2023-06-14 NOTE — TELEPHONE ENCOUNTER
----- Message from George High sent at 6/14/2023  2:53 PM CDT -----  Contact: Pill Bill  .Type:  RX Refill Request    Who Called:   Refill or New Rx: refill  RX Name and Strength:ELIQUIS 5 mg Tab and levETIRAcetam (KEPPRA) 500 MG Tab  How is the patient currently taking it? (ex. 1XDay):  Is this a 30 day or 90 day RX: 90  Preferred Pharmacy with phone number:[facility] [address]   Phone: [phone]  Fax:  [phone]  Local or Mail Order: Mail order  Ordering Provider:  Would the patient rather a call back or a response via [facility]?  Call back  Best Call Back Number:  Additional Information:

## 2023-08-01 ENCOUNTER — PATIENT MESSAGE (OUTPATIENT)
Dept: PRIMARY CARE CLINIC | Facility: CLINIC | Age: 88
End: 2023-08-01
Payer: MEDICARE

## 2023-08-01 DIAGNOSIS — Z86.718 HISTORY OF DVT (DEEP VEIN THROMBOSIS): Primary | ICD-10-CM

## 2023-08-29 NOTE — PROGRESS NOTES
"PATIENT: Isa Sewell  MRN: [id number]  DATE: 8/31/2023    Diagnosis:   1. History of deep vein thrombosis (DVT) of lower extremity    2. Hemiplegia and hemiparesis following cerebral infarction affecting unspecified side    3. Nonintractable generalized idiopathic epilepsy without status epilepticus    4. Paroxysmal atrial fibrillation    5. Stage 3a chronic kidney disease    6. Advance care planning    7. History of DVT (deep vein thrombosis)      Chief Complaint: history of deep vein thrombosis    Oncologic History:      Oncologic History     Oncologic Treatment     Pathology       Subjective:    History of Present Illness: Ms. Sewell is a 89 y.o. female who presents for evaluation and management of history of deep vein thrombosis. She is referred by Dr. Obregon.    - her first blood clot was in early 2010s.   - she had a stroke in 2018.   - She had a pulmonary embolism  - Ultrasound left lower extremity (3/27/19) revealed "echogenic material is identified within the left popliteal and posterior tibial veins consistent with deep venous thrombosis."  - ultrasound lower extremity (4/11/21) revealed a "deep vein thrombosis within the mid and distal left femoral vein and left popliteal vein."  - she had previously seen an outside hematologist. She has remained on apixaban ever since.    - today, she endorses fatigue, weakness. She denies shortness of breath, chest pain, nausea, vomiting, diarrhea, constipation.        Past medical, surgical, family, and social histories have been reviewed and updated below.    Past Medical History:   Past Medical History:   Diagnosis Date    Bullous pemphigoid     pemphigoid nodularis variant    DVT (deep venous thrombosis)     Dysphagia 6/12/2018    Embolic stroke involving right middle cerebral artery 6/9/2018    Hypertension     Pemphigus 4/4/2016    Seizure 10/19/2018       Past Surgical History:   Past Surgical History:   Procedure Laterality Date    BREAST BIOPSY Bilateral     " BOTH BENIGN    CHOLECYSTECTOMY      FRACTURE SURGERY Right     ankle    HYSTERECTOMY      TONSILLECTOMY         Family History:   Family History   Problem Relation Age of Onset    Melanoma Neg Hx     Psoriasis Neg Hx     Lupus Neg Hx     Eczema Neg Hx        Social History:  reports that she has never smoked. She has never used smokeless tobacco. She reports that she does not drink alcohol and does not use drugs.    Allergies:  Review of patient's allergies indicates:   Allergen Reactions    Sulfa (sulfonamide antibiotics) Hives       Medications:  Current Outpatient Medications   Medication Sig Dispense Refill    apixaban (ELIQUIS) 5 mg Tab Take 1 tablet (5 mg total) by mouth 2 (two) times daily. 180 tablet 2    atorvastatin (LIPITOR) 40 MG tablet Take 1 tablet by mouth once daily. 90 tablet 2    cetirizine (ZYRTEC) 10 MG tablet Take 10 mg by mouth once daily.      ciprofloxacin HCl (CIPRO) 250 MG tablet Take 1 tablet (250 mg total) by mouth 2 (two) times daily. 6 tablet 0    clobetasol 0.05% (TEMOVATE) 0.05 % Oint Apply 1 application topically 2 (two) times daily. Apply to affected area 60 g 5    clopidogreL (PLAVIX) 75 mg tablet Take 1 tablet by mouth once daily. 90 tablet 2    levETIRAcetam (KEPPRA) 500 MG Tab Take 1 tablet (500 mg total) by mouth 2 (two) times daily. 180 tablet 2    melatonin 3 mg Tab Take 1 tablet (3 mg total) by mouth every evening. 90 tablet 3    olmesartan (BENICAR) 40 MG tablet Take 1 tablet by mouth once daily. 90 tablet 2    omeprazole 20 mg TbEC Take 1 tablet by mouth every morning. 90 each 3    pulse oximeter (PULSE OXIMETER) device by Apply Externally route 2 (two) times a day. Use twice daily at 8 AM and 3 PM and [facility] the [facility] as directed. 1 each 0    triamcinolone acetonide 0.1% (KENALOG) 0.1 % ointment   3     No current facility-administered medications for this visit.       Review of Systems   Constitutional:  Positive for fatigue.   HENT:  Negative for sore throat.     Eyes:  Negative for visual disturbance.   Respiratory:  Negative for cough and shortness of breath.    Cardiovascular:  Negative for chest pain.   Gastrointestinal:  Negative for abdominal pain, constipation, diarrhea, nausea and vomiting.   Genitourinary:  Negative for dysuria.   Musculoskeletal:  Negative for back pain.   Skin:  Negative for rash.   Neurological:  Positive for weakness. Negative for headaches.   Hematological:  Negative for adenopathy.   Psychiatric/Behavioral:  The patient is not nervous/anxious.        ECOG Performance Status:   ECOG SCORE    1 - Restricted in strenuous activity-ambulatory and able to carry out work of a light nature         Objective:      Vitals:   Vitals:    08/31/23 1134   BP: (!) 179/79   BP Location: Right arm   Patient Position: Sitting   BP Method: Medium (Automatic)   Pulse: 72   Resp: 18   SpO2: 98%   Weight: 65.7 kg (144 lb 13.5 oz)     BMI: Body mass index is 26.49 kg/m².    Physical Exam  Vitals and nursing note reviewed.   Constitutional:       Appearance: She is well-developed.   HENT:      Head: Normocephalic and atraumatic.   Eyes:      Pupils: Pupils are equal, round, and reactive to light.   Cardiovascular:      Rate and Rhythm: Normal rate and regular rhythm.   Pulmonary:      Effort: Pulmonary effort is normal.      Breath sounds: Normal breath sounds.   Abdominal:      General: Bowel sounds are normal.      Palpations: Abdomen is soft.   Musculoskeletal:         General: Normal range of motion.      Cervical back: Normal range of motion and neck supple.   Skin:     General: Skin is warm and dry.   Neurological:      Mental Status: She is alert and oriented to person, place, and time.   Psychiatric:         Behavior: Behavior normal.         Thought Content: Thought content normal.         Judgment: Judgment normal.         Laboratory Data:  Labs have been reviewed.    Lab Results   Component Value Date    WBC 6.77 09/15/2022    HGB 12.6 09/15/2022    HCT  "39.3 09/15/2022    MCV 95 09/15/2022     09/15/2022           Imaging:    Ultrasound left lower extremity (4/11/21):  Examination is positive for DVT within the mid and distal left femoral vein and left popliteal vein.      Ultrasound left lower extremity (3/27/19):  Echogenic material is identified within the left popliteal and posterior tibial veins consistent with deep venous thrombosis.  Patient has a history of deep venous thrombosis is and is currently on blood thinners.    Assessment:       1. History of deep vein thrombosis (DVT) of lower extremity    2. Hemiplegia and hemiparesis following cerebral infarction affecting unspecified side    3. Nonintractable generalized idiopathic epilepsy without status epilepticus    4. Paroxysmal atrial fibrillation    5. Stage 3a chronic kidney disease    6. Advance care planning           Plan:     History of deep vein thrombosis  - I have reviewed her chart  - Ultrasound left lower extremity (3/27/19) revealed "echogenic material is identified within the left popliteal and posterior tibial veins consistent with deep venous thrombosis."  - ultrasound lower extremity (4/11/21) revealed a "deep vein thrombosis within the mid and distal left femoral vein and left popliteal vein."  - she had previously seen an outside hematologist. She has remained on apixaban ever since.  - I cannot find information about a previous hypercoagulable workup. However, given her history of multiple thrombotic episodes, a hypercoagulable workup would not change my recommendation, which is to remain on indefinite anticoagulation.  - after discussion, we decided not to proceed with hypercoagulable workup.  - return to clinic as needed.    2. History of stroke / hemiplegia  - chronic left-sided weakness related to her history of stroke.  - she uses a rolling walker for ambulation.  - no acute issues    3. Atrial fibrillation  - no acute issues  - continue apixaban.    4. Chronic kidney " disease stage 3a  - last eGFR was 48.5 ml/min/m2  - continue to monitor    5. Advance Care Planning     Date: 08/31/2023    Power of   I initiated the process of voluntary advance care planning today and explained the importance of this process to the patient.  I introduced the concept of advance directives to the patient, as well. Then the patient received detailed information about the importance of designating a Health Care Power of  (HCPOA). She was also instructed to communicate with this person about their wishes for future healthcare, should she become sick and lose decision-making capacity. The patient has not previously appointed a HCPOA. After our discussion, the patient has decided to complete a HCPOA and has appointed her  Melanie Graff ([phone]) and Isabella Sullivan ([phone]) . I spent a total time of 16 minutes discussing this issue with the patient.       - return to clinic as needed.    Eric Min M.D.  Hematology/Oncology  [facility] - [address], [address]  [place] [address]  Phone: [phone]  Fax: [phone]

## 2023-08-29 NOTE — SUBJECTIVE & OBJECTIVE
Interval History:  >4 elements OR status of 3 inpatient conditions  The patient had improvement in her dysarthria and L sided strength. She did not pass the bedside swallowing assessment.  S/P rTPA and R-M1 thrombectomy with TICI 3 flow. Her sheath is off. R MCA partial stroke inviolving the right  insular cortex and small hemorrhagic transformation.   Review of Systems   HENT: Negative.    Eyes: Negative.    Respiratory: Negative.    Cardiovascular: Negative.    Gastrointestinal: Negative.    Endocrine: Negative.    Genitourinary: Negative.    Allergic/Immunologic: Negative.    Neurological: Positive for facial asymmetry, speech difficulty and weakness.   Hematological: Negative.    Psychiatric/Behavioral: Negative.      2 systems OR Unable to obtain a complete ROS due to level of consciousness.  Objective:     Vitals:  Temp: 98.2 °F (36.8 °C)  Pulse: (!) 55  Rhythm: normal sinus rhythm, sinus arrhythmia  BP: (!) 150/65  MAP (mmHg): 94  Resp: (!) 23  SpO2: 98 %  O2 Device (Oxygen Therapy): nasal cannula    Temp  Min: 97.5 °F (36.4 °C)  Max: 98.2 °F (36.8 °C)  Pulse  Min: 54  Max: 83  BP  Min: 124/60  Max: 165/70  MAP (mmHg)  Min: 85  Max: 117  Resp  Min: 12  Max: 50  SpO2  Min: 95 %  Max: 100 %    06/09 0701 - 06/10 0700  In: 733.3 [I.V.:733.3]  Out: 1250 [Urine:1250]           Physical Exam  Unable to test gait due to level of consciousness.    General   HEENT:   Chest Heart RRR / Lungs Clear to auscultation  Abdomen: Soft nontender + BS  Extremities: OK distal pulses.  Skin: bullous pemphigoid.  Neurological Exam:  MS; Alert, oriented to P/T/P/R, No language abnormalities.    CN: II-XII LUMN VII, dysarthria improved.  Motor: LUE   4/5 / RUE  5/5  Tone normal bilaterally             LLE   4/5 /  RLE  5/5  Tone normal bilaterally  Sensory: LT/PP/T/ Vibration Decreased light touch on her left side.                 Complex sensory modalities: not tested  DTR:  normal throughout.  Coordination /Fine motor:   Gait:  See Flowsheet for immunotherapy administration. Patient waited in clinic 30 min for observation. Pt had epi pen on hand.     Not tested.  Meningeal signs: Absent     Medications:  Continuous  sodium chloride 0.9% Last Rate: 75 mL/hr at 06/10/18 2100   Scheduled  amLODIPine 5 mg Daily   aspirin 325 mg Daily   atorvastatin 40 mg Daily   clobetasol 0.05%  BID   heparin (porcine) 5,000 Units Q8H   sodium chloride 0.9% 3 mL Q8H   PRN   Today I personally reviewed pertinent medications, lines/drains/airways, imaging, cardiology, lab results, microbiology results, notably:    Diet  Diet NPO  Diet NPO    CMP:      Recent Labs  Lab 06/10/18  0250   CALCIUM 7.9*   ALBUMIN 3.0*   PROT 5.8*      K 3.4*   CO2 20*   *   BUN 15   CREATININE 0.8   ALKPHOS 69   ALT 28   AST 29   BILITOT 0.5      BMP:      Recent Labs  Lab 06/10/18  0250      K 3.4*   *   CO2 20*   BUN 15   CREATININE 0.8   CALCIUM 7.9*      CBC:      Recent Labs  Lab 06/10/18  0250   WBC 10.45   RBC 3.73*   HGB 11.0*   HCT 34.0*      MCV 91   MCH 29.5   MCHC 32.4      Lipid Panel:      Recent Labs  Lab 06/09/18  1712   CHOL 159   LDLCALC 109.4   HDL 36*   TRIG 68      Coagulation:      Recent Labs  Lab 06/09/18  1208   INR 1.0      Platelet Aggregation Study: No results for input(s): PLTAGG, PLTAGINTERP, PLTAGREGLACO, ADPPLTAGGREG in the last 168 hours.  Hgb A1C:      Recent Labs  Lab 06/09/18  1712   HGBA1C 6.2*      TSH:      Recent Labs  Lab 06/09/18  1712   TSH 1.507      No results for input(s): PH, PCO2, PO2, HCO3, POCSATURATED, BE in the last 24 hours.

## 2023-08-31 ENCOUNTER — OFFICE VISIT (OUTPATIENT)
Dept: HEMATOLOGY/ONCOLOGY | Facility: CLINIC | Age: 88
End: 2023-08-31
Payer: MEDICARE

## 2023-08-31 VITALS
HEART RATE: 72 BPM | BODY MASS INDEX: 26.49 KG/M2 | WEIGHT: 144.81 LBS | DIASTOLIC BLOOD PRESSURE: 79 MMHG | SYSTOLIC BLOOD PRESSURE: 179 MMHG | OXYGEN SATURATION: 98 % | RESPIRATION RATE: 18 BRPM

## 2023-08-31 DIAGNOSIS — Z71.89 ADVANCE CARE PLANNING: ICD-10-CM

## 2023-08-31 DIAGNOSIS — I48.0 PAROXYSMAL ATRIAL FIBRILLATION: ICD-10-CM

## 2023-08-31 DIAGNOSIS — I69.359 HEMIPLEGIA AND HEMIPARESIS FOLLOWING CEREBRAL INFARCTION AFFECTING UNSPECIFIED SIDE: ICD-10-CM

## 2023-08-31 DIAGNOSIS — N18.31 STAGE 3A CHRONIC KIDNEY DISEASE: ICD-10-CM

## 2023-08-31 DIAGNOSIS — Z86.718 HISTORY OF DEEP VEIN THROMBOSIS (DVT) OF LOWER EXTREMITY: Primary | ICD-10-CM

## 2023-08-31 DIAGNOSIS — Z86.718 HISTORY OF DVT (DEEP VEIN THROMBOSIS): ICD-10-CM

## 2023-08-31 DIAGNOSIS — G40.309 NONINTRACTABLE GENERALIZED IDIOPATHIC EPILEPSY WITHOUT STATUS EPILEPTICUS: ICD-10-CM

## 2023-08-31 PROCEDURE — 99204 OFFICE O/P NEW MOD 45 MIN: CPT | Mod: S$PBB,,, | Performed by: INTERNAL MEDICINE

## 2023-08-31 PROCEDURE — 99497 ADVNCD CARE PLAN 30 MIN: CPT | Mod: PBBFAC,PO | Performed by: INTERNAL MEDICINE

## 2023-08-31 PROCEDURE — 99497 PR ADVNCD CARE PLAN 30 MIN: ICD-10-PCS | Mod: S$PBB,,, | Performed by: INTERNAL MEDICINE

## 2023-08-31 PROCEDURE — 99214 OFFICE O/P EST MOD 30 MIN: CPT | Mod: PBBFAC,25,PO | Performed by: INTERNAL MEDICINE

## 2023-08-31 PROCEDURE — 99204 PR OFFICE/OUTPT VISIT, NEW, LEVL IV, 45-59 MIN: ICD-10-PCS | Mod: S$PBB,,, | Performed by: INTERNAL MEDICINE

## 2023-08-31 PROCEDURE — 99497 ADVNCD CARE PLAN 30 MIN: CPT | Mod: S$PBB,,, | Performed by: INTERNAL MEDICINE

## 2023-08-31 PROCEDURE — 99999 PR PBB SHADOW E&M-EST. PATIENT-LVL IV: CPT | Mod: PBBFAC,,, | Performed by: INTERNAL MEDICINE

## 2023-08-31 PROCEDURE — 99999 PR PBB SHADOW E&M-EST. PATIENT-LVL IV: ICD-10-PCS | Mod: PBBFAC,,, | Performed by: INTERNAL MEDICINE

## 2023-08-31 RX ORDER — TRIMETHOPRIM 100 MG/1
100 TABLET ORAL EVERY 12 HOURS
COMMUNITY
End: 2023-09-28

## 2023-08-31 RX ORDER — AMLODIPINE BESYLATE 5 MG/1
5 TABLET ORAL DAILY
COMMUNITY
End: 2024-03-17 | Stop reason: SDUPTHER

## 2023-09-25 ENCOUNTER — TELEPHONE (OUTPATIENT)
Dept: PRIMARY CARE CLINIC | Facility: CLINIC | Age: 88
End: 2023-09-25
Payer: MEDICARE

## 2023-09-25 NOTE — TELEPHONE ENCOUNTER
----- Message from Paulette Luke sent at 9/25/2023 10:52 AM CDT -----  Type:  Needs Medical Advice    Who Called: pt  Symptoms (please be specific): please send in Urine orders, pt believes she has a UTI pt can do it right now please call and let them know send to [facility] for the orders    Would the patient rather a call back or a response via [facility]? call  Best Call Back Number: [phone]  Additional Information:

## 2023-09-26 ENCOUNTER — E-VISIT (OUTPATIENT)
Dept: PRIMARY CARE CLINIC | Facility: CLINIC | Age: 88
End: 2023-09-26
Payer: MEDICARE

## 2023-09-26 DIAGNOSIS — R30.0 DYSURIA: Primary | ICD-10-CM

## 2023-09-26 PROCEDURE — 99421 PR E&M, ONLINE DIGIT, EST, < 7 DAYS, 5-10 MINS: ICD-10-PCS | Mod: ,,, | Performed by: INTERNAL MEDICINE

## 2023-09-26 PROCEDURE — 99421 OL DIG E/M SVC 5-10 MIN: CPT | Mod: ,,, | Performed by: INTERNAL MEDICINE

## 2023-09-26 NOTE — TELEPHONE ENCOUNTER
Pt daughter called in requesting UA orders. States her mom has been experiencing sx of UTI since Sunday. Encouraged daughter to fill out evisit form asap. Daughter requesting UA and cx be ordered today. Explained I would send PCP a message as well.

## 2023-09-26 NOTE — PROGRESS NOTES
Patient ID: Isa Sewell is a 89 y.o. female.    Chief Complaint: Urinary Tract Infection (Entered automatically based on patient selection in Patient Portal.)    The patient initiated a request through [employer] on 9/26/2023 for evaluation and management with a chief complaint of Urinary Tract Infection (Entered automatically based on patient selection in Patient Portal.)     I evaluated the questionnaire submission on 9/26/2023.    [facility] Evisit Uti Questionnaire    9/26/2023 12:45 PM CDT - Filed by Patient   Do you agree to participate in an E-Visit? Yes   If you have any of the following problems, please present to your local ER or call 911:  I acknowledge   What is the main issue that you would like for your doctor to address today? Urinalysis to see if a uti is present   Are you able to take your vital signs? No   What symptoms do you currently have? Pain while passing urine;  Difficulty passing urine   When did your symptoms first appear? 9/24/2023   List what you have done or taken to help your symptoms. Na   Please indicate whether you have had the following symptoms during the past 24 hours     Urgent urination (a sudden and uncontrollable urge to urinate) Moderate   Frequent urination of small amounts of urine (going to the toilet very often) Moderate   Burning pain when urinating Moderate   Incomplete bladder emptying (still feel like you need to urinate again after urination) Mild   Pain not associated with urination in the lower abdomen below the belly button) None   What does your urine look like? Cloudy   Blood seen in the urine None   Flank pain (pain in one or both sides of the lower back) None   Abnormal Vaginal Discharge (abnormal amount, color and/or odor) None   Discharge from the urethra (urinary opening) without urination None   High body temperature/fever? None-<99.5   Please rate how much discomfort you have experience because of the symptoms in the past 24 hours: Moderate   Please  indicate how the symptoms have interfered with your every day activities/work in the past 24 hours: Moderate   Please indicate how these symptoms have interfered with your social activities (visiting people, meeting with friends, etc.) in the past 24 hours? Moderate   Are you a diabetic? No   Please indicate whether you have the following at the time of completion of this questionnaire: None of the above   Provide any information you feel is important to your history not asked above    Please attach any relevant images or files (if you have performed a home test for UTI, please submit a photo of results)          Recent Labs Obtained:  No visits with results within 7 Day(s) from this visit.   Latest known visit with results is:   Lab Visit on 12/23/2022   Component Date Value Ref Range Status    Specimen UA 12/23/2022 Urine, Clean Catch   Final    Color, UA 12/23/2022 Orange (A)  Yellow, Straw, Pia Final    Appearance, UA 12/23/2022 Hazy (A)  Clear Final    pH, UA 12/23/2022 5.0  5.0 - 8.0 Final    Specific Gravity, UA 12/23/2022 1.025  1.005 - 1.030 Final    Protein, UA 12/23/2022 1+ (A)  Negative Final    Comment: Recommend a 24 hour urine protein or a urine   protein/creatinine ratio if globulin induced proteinuria is  clinically suspected.      Glucose, UA 12/23/2022 1+ (A)  Negative Final    Ketones, UA 12/23/2022 Trace (A)  Negative Final    Bilirubin (UA) 12/23/2022 Negative  Negative Final    Occult Blood UA 12/23/2022 1+ (A)  Negative Final    Nitrite, UA 12/23/2022 Positive (A)  Negative Final    Urobilinogen, UA 12/23/2022 Negative  <2.0 EU/dL Final    Leukocytes, UA 12/23/2022 Trace (A)  Negative Final    RBC, UA 12/23/2022 3  0 - 4 /hpf Final    WBC, UA 12/23/2022 50 (H)  0 - 5 /hpf Final    Bacteria 12/23/2022 Many (A)  None-Occ /hpf Final    Hyaline Casts, UA 12/23/2022 0  0-1/lpf /lpf Final    Microscopic Comment 12/23/2022 SEE COMMENT   Final    Comment: Other formed elements not mentioned in the  report are not   present in the microscopic examination.       Urine Culture, Routine 12/23/2022  (A)   Final                    Value:CITROBACTER WERKMANII  > 100,000 cfu/ml         Encounter Diagnosis   Name Primary?    Dysuria Yes        Orders Placed This Encounter   Procedures    Urinalysis, Reflex to Urine Culture Urine, Clean Catch     Standing Status:   Future     Standing Expiration Date:   9/26/2024     Order Specific Question:   Preferred Collection Type     Answer:   Urine, Clean Catch     Order Specific Question:   Specimen Source     Answer:   Urine            No follow-ups on file.      E-Visit Time Tracking:    Day 1 Time (in minutes): 5     Total Time (in minutes): 5

## 2023-09-28 ENCOUNTER — TELEPHONE (OUTPATIENT)
Dept: PRIMARY CARE CLINIC | Facility: CLINIC | Age: 88
End: 2023-09-28
Payer: MEDICARE

## 2023-09-28 DIAGNOSIS — R30.0 DYSURIA: Primary | ICD-10-CM

## 2023-09-28 RX ORDER — CIPROFLOXACIN 250 MG/1
250 TABLET, FILM COATED ORAL EVERY 12 HOURS
Qty: 6 TABLET | Refills: 0 | Status: SHIPPED | OUTPATIENT
Start: 2023-09-28 | End: 2023-10-01

## 2023-09-28 NOTE — TELEPHONE ENCOUNTER
----- Message from Griselda Snyder sent at 9/28/2023 12:24 PM CDT -----  Type:  Needs Medical Advice    Who Called: pt daughter  Would the patient rather a call back or a response via [facility]? call  Best Call Back Number:   Additional Information: would really like to hear about urinalysis results has been waiting to hear from office for a couple of days regarding uti/frequent urination

## 2023-09-28 NOTE — TELEPHONE ENCOUNTER
Discussed with daughter, pt symptomatic  waiting on final urine culture report, so far no growth 2days, will preemptively treat with Cipro  X 3 days, did discuss with daughter if no improvement recommend f/u with her urologist for incomplete bladder emptying (she will place a call). Denies confusion. Encouraged to stay hydrated. Has annual appt in 1 month with labs.    Thanks,    Ketty Pate NP

## 2023-09-28 NOTE — TELEPHONE ENCOUNTER
----- Message from Angela Cross sent at 9/28/2023 10:25 AM CDT -----  Type:  Needs Medical Advice    Who Called: pt daughter    Would the patient rather a call back or a response via [facility]? call  Best Call Back Number: [phone] esteban vega  Additional Information: pt daughter is requesting to get a return call to discuss the pt lab results from a urinalysis that was done on 09/26/23

## 2023-10-25 ENCOUNTER — PATIENT MESSAGE (OUTPATIENT)
Dept: PRIMARY CARE CLINIC | Facility: CLINIC | Age: 88
End: 2023-10-25
Payer: MEDICARE

## 2023-10-25 RX ORDER — ATORVASTATIN CALCIUM 40 MG/1
40 TABLET, FILM COATED ORAL DAILY
Qty: 90 TABLET | Refills: 0 | Status: CANCELLED | OUTPATIENT
Start: 2023-10-25

## 2023-10-25 NOTE — TELEPHONE ENCOUNTER
Refill Routing Note   Medication(s) are not appropriate for processing by [facility] for the following reason(s):      Required labs outdated    [facility] action(s):    Care Due:  None identified            Appointments  past 12m or future 3m with PCP    Date Provider   Last Visit   9/26/2023 Dariel Obregon MD   Next Visit   11/6/2023 Dariel Obregon MD   ED visits in past 90 days: 0        Note composed:3:05 PM 10/25/2023

## 2023-10-25 NOTE — TELEPHONE ENCOUNTER
No care due was identified.  [facility] Embedded Care Due Messages. Reference number: [id number].   10/25/2023 1:04:56 PM CDT

## 2023-10-26 RX ORDER — ATORVASTATIN CALCIUM 40 MG/1
40 TABLET, FILM COATED ORAL DAILY
Qty: 90 TABLET | Refills: 2 | Status: SHIPPED | OUTPATIENT
Start: 2023-10-26

## 2023-10-26 NOTE — TELEPHONE ENCOUNTER
No care due was identified.  [facility] Embedded Care Due Messages. Reference number: [id number].   10/26/2023 7:49:41 AM CDT

## 2023-10-29 NOTE — HPI
86 year old female with HTN on amlodipine and olmesartan. She was transferred for stroke s/p TPA.   Patient was driving her car when she started to be symptomatic with left sided weakness at 11:30am. She was taken to OSH where she was diagnosed with Right MCA stroke as she had dense MCA sign in CTH. tPA was given at 12:45pm which she finished en route just before [place] [facility] [place] 1[place]53 p.m.    Since then, she had minimal improvement of her symptoms. Patient only complaint at this time is nausea.  Family reports they noticed left facial droop, and inability to move left arm and leg. No history of CVA in the past.  
Isa Sewell is an 87-year-old female with PMHx of HTN, DVT, and bullous pemphigoid.  [facility] with acute onset left-sided weakness, right gaze preference, and facial droop.  On arrival, tele-stroke consult completed.  CTH without acute pathology, and IV tPA administered.  Transferred to [facility] on 6/9/18 for further evaluation and management.  Upon admission, CTA multiphase showed acute R MCA infarct with distal R M1 occlusion, and she underwent cerebral angiogram with thrombectomy.  MRI brain re-demonstrated large R MCA infarction with small area of hemorrhagic conversion.      Functional History: Patient lives in [place] with her  in a single story home with threshold to enter.  Prior to admission, she was (I) with ADLs, including driving, and mobility.  DME: shower chair.  
Ms Sewell is a 86F that presents as transfer from OSH after acute onset of hemiplegia at 1300, witnessed by family. tPA given at OSH, decision by ED at [facility]. Transferred for possible for thrombectomy    
Ms. Sewell is a 87 y/o female with PMH significant for HTN and bullous pemphigoid who presents to [facility] s/p tPA and R MCA thrombectomy. Patient developed acute onset LSW at 1130 am. Patient received TPA at 1245 pm and was then transferred to [facility] [place] for thrombectomy. Patient had successful thrombectomy of R MCA with TICI 3 after two passes.   
atraumatic/normal range of motion

## 2023-11-01 ENCOUNTER — PATIENT MESSAGE (OUTPATIENT)
Dept: PRIMARY CARE CLINIC | Facility: CLINIC | Age: 88
End: 2023-11-01
Payer: MEDICARE

## 2023-11-01 DIAGNOSIS — I10 BENIGN ESSENTIAL HTN: Primary | ICD-10-CM

## 2023-11-01 DIAGNOSIS — E78.2 MIXED HYPERLIPIDEMIA: ICD-10-CM

## 2023-11-01 DIAGNOSIS — R73.03 BORDERLINE DIABETES: ICD-10-CM

## 2023-11-06 ENCOUNTER — OFFICE VISIT (OUTPATIENT)
Dept: PRIMARY CARE CLINIC | Facility: CLINIC | Age: 88
End: 2023-11-06
Payer: MEDICARE

## 2023-11-06 VITALS
WEIGHT: 147.25 LBS | SYSTOLIC BLOOD PRESSURE: 130 MMHG | BODY MASS INDEX: 27.1 KG/M2 | HEIGHT: 62 IN | OXYGEN SATURATION: 98 % | HEART RATE: 68 BPM | DIASTOLIC BLOOD PRESSURE: 80 MMHG

## 2023-11-06 DIAGNOSIS — N18.31 STAGE 3A CHRONIC KIDNEY DISEASE: ICD-10-CM

## 2023-11-06 DIAGNOSIS — E78.2 MIXED HYPERLIPIDEMIA: ICD-10-CM

## 2023-11-06 DIAGNOSIS — L10.9 PEMPHIGUS: ICD-10-CM

## 2023-11-06 DIAGNOSIS — R73.03 BORDERLINE DIABETES: ICD-10-CM

## 2023-11-06 DIAGNOSIS — Z86.73 HX OF ISCHEMIC RIGHT MCA STROKE: ICD-10-CM

## 2023-11-06 DIAGNOSIS — I10 BENIGN ESSENTIAL HTN: Primary | ICD-10-CM

## 2023-11-06 DIAGNOSIS — I48.0 PAROXYSMAL ATRIAL FIBRILLATION: ICD-10-CM

## 2023-11-06 DIAGNOSIS — Z86.718 HISTORY OF DVT (DEEP VEIN THROMBOSIS): ICD-10-CM

## 2023-11-06 PROCEDURE — 99213 OFFICE O/P EST LOW 20 MIN: CPT | Mod: PBBFAC | Performed by: INTERNAL MEDICINE

## 2023-11-06 PROCEDURE — 99214 OFFICE O/P EST MOD 30 MIN: CPT | Mod: S$PBB,,, | Performed by: INTERNAL MEDICINE

## 2023-11-06 PROCEDURE — 99999 PR PBB SHADOW E&M-EST. PATIENT-LVL III: ICD-10-PCS | Mod: PBBFAC,,, | Performed by: INTERNAL MEDICINE

## 2023-11-06 PROCEDURE — 99999 PR PBB SHADOW E&M-EST. PATIENT-LVL III: CPT | Mod: PBBFAC,,, | Performed by: INTERNAL MEDICINE

## 2023-11-06 PROCEDURE — 99214 PR OFFICE/OUTPT VISIT, EST, LEVL IV, 30-39 MIN: ICD-10-PCS | Mod: S$PBB,,, | Performed by: INTERNAL MEDICINE

## 2023-11-06 NOTE — PROGRESS NOTES
[facility] Note    Chief Complaint      Chief Complaint   Patient presents with    Annual Exam       History of Present Illness      Isa Sewell is a 89 y.o. female with chronic conditions of HTN, HLD, A fib, hx of stroke, hx of DVT, epilepsy, pemphigus, chronic low back pain who presents today for: follow up chronic conditions.  A fib: Sees Dr. Chau.  On eliquis for anticoagulation.  Still off coreg 2/2 syncope.  Denies chest pain, shortness of breath, palpitations.    HTN: BP at goal on benicar.  Coreg, amlodipine previously.  HLD: controlled with lipitor.  LDL 71  Hx of stroke: On eliquis and plavix for secondary prevention.  No new deficits.  Hx of DVT: Sees Dr. Min.  On eliquis for anticoagulation.  Will not likely need continued hematology     Epilepsy: Sees Dr. Mata, neurology.  On keppra.  No recent seizure activity  Pemphigus: Sees Dr. Sepulveda, but not recently.  On clobetasol cream for maintenance.    Flu shot UTD.  Prevnar, pneumovax UTD.  COVID vaccine UTD.    Mammograms and cscopes N/A 2/2 age.       Past Medical History:  Past Medical History:   Diagnosis Date    Bullous pemphigoid     pemphigoid nodularis variant    DVT (deep venous thrombosis)     Dysphagia 6/12/2018    Embolic stroke involving right middle cerebral artery 6/9/2018    Hypertension     Pemphigus 4/4/2016    Seizure 10/19/2018       Past Surgical History:   has a past surgical history that includes Hysterectomy; Tonsillectomy; Cholecystectomy; Fracture surgery (Right); and Breast biopsy (Bilateral).    Family History:  family history is not on file.     Social History:  Social History     Tobacco Use    Smoking status: Never    Smokeless tobacco: Never   Substance Use Topics    Alcohol use: No    Drug use: No       I personally reviewed all past medical, surgical, social and family history.    Review of Systems   Constitutional:  Negative for chills, fever and malaise/fatigue.   Respiratory:  Negative for  shortness of breath.    Cardiovascular:  Negative for chest pain.   Gastrointestinal:  Negative for constipation, diarrhea, nausea and vomiting.   Skin:  Negative for rash.   Neurological:  Negative for weakness.   All other systems reviewed and are negative.       Medications:  Outpatient Encounter Medications as of 11/6/2023   Medication Sig Dispense Refill    amLODIPine (NORVASC) 5 MG tablet Take 5 mg by mouth once daily.      apixaban (ELIQUIS) 5 mg Tab Take 1 tablet (5 mg total) by mouth 2 (two) times daily. 180 tablet 2    atorvastatin (LIPITOR) 40 MG tablet Take 1 tablet (40 mg total) by mouth once daily. 90 tablet 2    cetirizine (ZYRTEC) 10 MG tablet Take 10 mg by mouth once daily.      clobetasol 0.05% (TEMOVATE) 0.05 % Oint Apply 1 application topically 2 (two) times daily. Apply to affected area 60 g 5    clopidogreL (PLAVIX) 75 mg tablet Take 1 tablet by mouth once daily. 90 tablet 0    levETIRAcetam (KEPPRA) 500 MG Tab Take 1 tablet (500 mg total) by mouth 2 (two) times daily. 180 tablet 2    melatonin 3 mg Tab Take 1 tablet (3 mg total) by mouth every evening. 90 tablet 3    olmesartan (BENICAR) 40 MG tablet Take 1 tablet by mouth once daily. 90 tablet 0    omeprazole 20 mg TbEC Take 1 tablet by mouth every morning. 90 each 3    pulse oximeter (PULSE OXIMETER) device by Apply Externally route 2 (two) times a day. Use twice daily at 8 AM and 3 PM and record the value in PneumRxCrary as directed. 1 each 0    triamcinolone acetonide 0.1% (KENALOG) 0.1 % ointment   3    [DISCONTINUED] atorvastatin (LIPITOR) 40 MG tablet Take 1 tablet by mouth once daily. 90 tablet 2     No facility-administered encounter medications on file as of 11/6/2023.       Allergies:  Review of patient's allergies indicates:   Allergen Reactions    Sulfa (sulfonamide antibiotics) Hives       Health Maintenance:  Immunization History   Administered Date(s) Administered    COVID-19, MRNA, LN-S, PF (Pfizer) (Purple Cap) 04/15/2021,  "05/06/2021    Influenza 10/24/2013, 11/04/2014    Influenza (FLUAD) - Quadrivalent - Adjuvanted - PF *Preferred* (65+) 10/13/2020    Influenza (FLUAD) - Trivalent - Adjuvanted - PF (65+) 09/27/2018    Influenza - High Dose - PF (65 years and older) 10/15/2019    Influenza - Quadrivalent 11/14/2017    Influenza - Trivalent (ADULT) 11/06/2009, 11/29/2012    Influenza A (H1N1) 2009 Monovalent - IM - PF 11/06/2009    Pneumococcal Conjugate - 13 Valent 10/12/2018      Health Maintenance   Topic Date Due    TETANUS VACCINE  Never done    Shingles Vaccine (1 of 2) Never done    Lipid Panel  11/03/2028        Physical Exam      Vital Signs  Pulse: 68  SpO2: 98 %  BP: 130/80  BP Location: Right arm  Patient Position: Sitting  Pain Score: 0-No pain  Height and Weight  Height: 5' 2" (157.5 cm)  Weight: 66.8 kg (147 lb 4.3 oz)  BSA (Calculated - sq m): 1.71 sq meters  BMI (Calculated): 26.9  Weight in (lb) to have BMI = 25: 136.4]    Physical Exam  Vitals reviewed.   Constitutional:       Appearance: She is well-developed.   HENT:      Head: Normocephalic and atraumatic.      Right Ear: External ear normal.      Left Ear: External ear normal.   Cardiovascular:      Rate and Rhythm: Normal rate and regular rhythm.      Heart sounds: Normal heart sounds. No murmur heard.  Pulmonary:      Effort: Pulmonary effort is normal.      Breath sounds: Normal breath sounds. No wheezing or rales.   Abdominal:      General: Bowel sounds are normal. There is no distension.      Palpations: Abdomen is soft.      Tenderness: There is no abdominal tenderness.          Laboratory:  CBC:  Recent Labs   Lab 04/12/21  0544 09/15/22  0806 11/03/23  0755   WBC 5.96 6.77 6.07   RBC 4.35 4.16 4.26   Hemoglobin 13.0 12.6 13.4   Hematocrit 41.0 39.3 42.0   Platelets 201 208 190   MCV 94 95 99 H   MCH 29.9 30.3 31.5 H   MCHC 31.7 L 32.1 31.9 L     CMP:  Recent Labs   Lab 04/11/21  1358 04/12/21  0544 09/15/22  0806 11/03/23  0756   Glucose 97   < > 99 104 "   Calcium 9.3   < > 9.0 8.9   Albumin 4.0  --  4.3 4.1   Total Protein 6.7  --  7.1 7.4   Sodium 146 H   < > 146 H 146 H   Potassium 4.7   < > 4.3 4.3   CO2 23   < > 28 26   Chloride 112 H   < > 109 109   BUN 19 H   < > 22 H 18 H   Alkaline Phosphatase 97  --  130 H 132 H   ALT 17  --  19 26   AST 22  --  29 34   Total Bilirubin 0.7  --  0.7 0.5    < > = values in this interval not displayed.     URINALYSIS:  Recent Labs   Lab 04/20/21  1030 08/27/21  0907 12/23/22  1323 09/26/23  1417   Color, UA Yellow   < > Orange A Yellow   Specific Gravity, UA >=1.030 A   < > 1.025 1.025   pH, UA 6.0   < > 5.0 6.0   Protein, UA 1+ A   < > 1+ A Negative   Bacteria Few A   < > Many A  --    Nitrite, UA Negative   < > Positive A Negative   Leukocytes, UA Trace A   < > Trace A 1+ A   Urobilinogen, UA Negative   < > Negative Negative   Hyaline Casts, UA 1  --  0  --     < > = values in this interval not displayed.      LIPIDS:  Recent Labs   Lab 02/02/21  0725 04/11/21  1358 09/15/22  0806 11/03/23  0756   TSH  --  3.390  --  4.750 H   HDL 37 L  --  43 48   Cholesterol 121  --  139 139   Triglycerides 114  --  80 98   LDL Cholesterol 61.2 L  --  80.0 71.4   HDL/Cholesterol Ratio 30.6  --  30.9 34.5   Non-HDL Cholesterol 84  --  96 91   Total Cholesterol/HDL Ratio 3.3  --  3.2 2.9     TSH:  Recent Labs   Lab 04/11/21  1358 11/03/23  0756   TSH 3.390 4.750 H     A1C:  Recent Labs   Lab 04/11/21  1358 11/03/23  0756   Hemoglobin A1C 5.8 H 5.9 H       Assessment/Plan     Isa Sewell is a 89 y.o.female with:    1. Paroxysmal atrial fibrillation  Continue current meds.  F/U with cardiology.   2. Benign essential HTN  Continue current meds.    3. Stage 3a chronic kidney disease  GFR stable.  4. Mixed hyperlipidemia  Continue current meds.    5. Borderline diabetes  Reviewed labs.  6. History of DVT (deep vein thrombosis)  Continue current meds.    7. Pemphigus  Continue current meds.    8. Hx of ischemic right MCA stroke  Continue  current meds.      Chronic conditions status updated as per HPI.  Other than changes above, cont current medications and maintain follow up with specialists.  Follow up in about 1 year (around 11/6/2024) for Follow up visit.    No future appointments.    Dariel Obregon MD  [facility]

## 2023-12-05 RX ORDER — PHENOL/SODIUM PHENOLATE
AEROSOL, SPRAY (ML) MUCOUS MEMBRANE
Qty: 90 EACH | Refills: 3 | Status: SHIPPED | OUTPATIENT
Start: 2023-12-05 | End: 2023-12-17 | Stop reason: SDUPTHER

## 2023-12-05 NOTE — TELEPHONE ENCOUNTER
No care due was identified.  Health [facility] Embedded Care Due Messages. Reference number: [id number].   12/05/2023 5:08:40 AM CST

## 2023-12-05 NOTE — TELEPHONE ENCOUNTER
Refill Decision Note   Isa Sewell  is requesting a refill authorization.  Brief Assessment and Rationale for Refill:  Approve     Medication Therapy Plan:  Patient has been on Plavix and Omeprazole for a year      Comments:     Note composed:11:14 AM 12/05/2023

## 2023-12-17 ENCOUNTER — PATIENT MESSAGE (OUTPATIENT)
Dept: PRIMARY CARE CLINIC | Facility: CLINIC | Age: 88
End: 2023-12-17
Payer: MEDICARE

## 2023-12-17 RX ORDER — PHENOL/SODIUM PHENOLATE
1 AEROSOL, SPRAY (ML) MUCOUS MEMBRANE EVERY MORNING
Qty: 90 EACH | Refills: 3 | Status: SHIPPED | OUTPATIENT
Start: 2023-12-17

## 2023-12-18 NOTE — TELEPHONE ENCOUNTER
No care due was identified.  [facility] Embedded Care Due Messages. Reference number: [id number].   12/18/2023 3:27:53 PM CST

## 2023-12-18 NOTE — TELEPHONE ENCOUNTER
Refill Decision Note   Isa Sewell  is requesting a refill authorization.  Brief Assessment and Rationale for Refill:        Medication Therapy Plan:           Comments:     No Care Gaps recommended.     Note composed:6:53 PM 12/17/2023

## 2024-01-01 NOTE — TELEPHONE ENCOUNTER
No care due was identified.  Health [facility] Embedded Care Due Messages. Reference number: [id number].   1/01/2024 8:37:29 AM CST

## 2024-01-02 RX ORDER — CLOPIDOGREL BISULFATE 75 MG/1
75 TABLET ORAL DAILY
Qty: 90 TABLET | Refills: 3 | Status: SHIPPED | OUTPATIENT
Start: 2024-01-02 | End: 2024-01-16 | Stop reason: SDUPTHER

## 2024-01-02 RX ORDER — OLMESARTAN MEDOXOMIL 40 MG/1
40 TABLET ORAL DAILY
Qty: 90 TABLET | Refills: 3 | Status: SHIPPED | OUTPATIENT
Start: 2024-01-02 | End: 2024-01-16 | Stop reason: SDUPTHER

## 2024-01-02 NOTE — TELEPHONE ENCOUNTER
Refill Decision Note   Isa Sewell  is requesting a refill authorization.  Brief Assessment and Rationale for Refill:  Approve     Medication Therapy Plan:  Per Epic data, No dispenses of Omeprazole within 180 days of the adherence period (since 1/5/2023)      Pharmacist review requested: Yes   Extended chart review required: Yes   Comments:     Note composed:4:56 PM 01/02/2024

## 2024-01-02 NOTE — TELEPHONE ENCOUNTER
Refill Routing Note   Medication(s) are not appropriate for processing by [facility] for the following reason(s):        Drug-drug interaction: contraindicated proton pump inhibitors on active medication list; No prior override by participating responsible provider    [facility] action(s):  Defer  Approve        Medication Therapy Plan: Per Epic data, No dispenses of Omeprazole within 180 days of the adherence period (since 1/5/2023)    Pharmacist review requested: Yes     Appointments  past 12m or future 3m with PCP    Date Provider   Last Visit   11/6/2023 Dariel Obregon MD   Next Visit   Visit date not found Dariel Obregon MD   ED visits in past 90 days: 0        Note composed:4:09 PM 01/02/2024

## 2024-01-16 RX ORDER — OLMESARTAN MEDOXOMIL 40 MG/1
40 TABLET ORAL DAILY
Qty: 90 TABLET | Refills: 3 | Status: SHIPPED | OUTPATIENT
Start: 2024-01-16

## 2024-01-16 RX ORDER — CLOPIDOGREL BISULFATE 75 MG/1
75 TABLET ORAL DAILY
Qty: 90 TABLET | Refills: 3 | Status: SHIPPED | OUTPATIENT
Start: 2024-01-16

## 2024-01-16 NOTE — TELEPHONE ENCOUNTER
No care due was identified.  [facility] Embedded Care Due Messages. Reference number: [id number].   1/16/2024 3:09:08 AM CST

## 2024-03-02 DIAGNOSIS — I48.0 PAROXYSMAL ATRIAL FIBRILLATION: ICD-10-CM

## 2024-03-02 DIAGNOSIS — G40.309 NONINTRACTABLE GENERALIZED IDIOPATHIC EPILEPSY WITHOUT STATUS EPILEPTICUS: ICD-10-CM

## 2024-03-02 DIAGNOSIS — Z86.718 HISTORY OF DVT (DEEP VEIN THROMBOSIS): ICD-10-CM

## 2024-03-04 RX ORDER — LEVETIRACETAM 500 MG/1
500 TABLET ORAL 2 TIMES DAILY
Qty: 180 TABLET | Refills: 0 | Status: SHIPPED | OUTPATIENT
Start: 2024-03-04 | End: 2024-05-30

## 2024-03-04 RX ORDER — APIXABAN 5 MG/1
5 TABLET, FILM COATED ORAL 2 TIMES DAILY
Qty: 180 TABLET | Refills: 0 | Status: SHIPPED | OUTPATIENT
Start: 2024-03-04 | End: 2024-05-30

## 2024-03-16 ENCOUNTER — PATIENT MESSAGE (OUTPATIENT)
Dept: PRIMARY CARE CLINIC | Facility: CLINIC | Age: 89
End: 2024-03-16
Payer: MEDICARE

## 2024-03-18 RX ORDER — AMLODIPINE BESYLATE 5 MG/1
5 TABLET ORAL DAILY
Qty: 90 TABLET | Refills: 3 | Status: SHIPPED | OUTPATIENT
Start: 2024-03-18

## 2024-03-18 NOTE — TELEPHONE ENCOUNTER
No care due was identified.  [facility] Embedded Care Due Messages. Reference number: [id number].   3/17/2024 7:16:04 PM CDT

## 2024-05-30 DIAGNOSIS — Z86.718 HISTORY OF DVT (DEEP VEIN THROMBOSIS): ICD-10-CM

## 2024-05-30 DIAGNOSIS — I48.0 PAROXYSMAL ATRIAL FIBRILLATION: ICD-10-CM

## 2024-05-30 DIAGNOSIS — G40.309 NONINTRACTABLE GENERALIZED IDIOPATHIC EPILEPSY WITHOUT STATUS EPILEPTICUS: ICD-10-CM

## 2024-05-30 RX ORDER — APIXABAN 5 MG/1
5 TABLET, FILM COATED ORAL 2 TIMES DAILY
Qty: 180 TABLET | Refills: 0 | Status: SHIPPED | OUTPATIENT
Start: 2024-05-30

## 2024-05-30 RX ORDER — LEVETIRACETAM 500 MG/1
500 TABLET ORAL 2 TIMES DAILY
Qty: 180 TABLET | Refills: 0 | Status: SHIPPED | OUTPATIENT
Start: 2024-05-30

## 2024-06-17 ENCOUNTER — PATIENT MESSAGE (OUTPATIENT)
Dept: PRIMARY CARE CLINIC | Facility: CLINIC | Age: 89
End: 2024-06-17
Payer: MEDICARE

## 2024-06-17 DIAGNOSIS — I48.0 PAROXYSMAL ATRIAL FIBRILLATION: ICD-10-CM

## 2024-06-17 DIAGNOSIS — Z86.718 HISTORY OF DVT (DEEP VEIN THROMBOSIS): ICD-10-CM

## 2024-06-17 DIAGNOSIS — G40.309 NONINTRACTABLE GENERALIZED IDIOPATHIC EPILEPSY WITHOUT STATUS EPILEPTICUS: ICD-10-CM

## 2024-06-17 RX ORDER — LEVETIRACETAM 500 MG/1
500 TABLET ORAL 2 TIMES DAILY
Qty: 180 TABLET | Refills: 0 | Status: CANCELLED | OUTPATIENT
Start: 2024-06-17

## 2024-06-24 DIAGNOSIS — L10.9 PEMPHIGUS: ICD-10-CM

## 2024-06-24 RX ORDER — CLOBETASOL PROPIONATE 0.5 MG/G
1 OINTMENT TOPICAL 2 TIMES DAILY
Qty: 60 G | Refills: 0 | Status: SHIPPED | OUTPATIENT
Start: 2024-06-24

## 2024-06-24 NOTE — TELEPHONE ENCOUNTER
No care due was identified.  Health [facility] Embedded Care Due Messages. Reference number: [id number].   6/24/2024 12:52:06 PM CDT

## 2024-06-24 NOTE — TELEPHONE ENCOUNTER
Refill Decision Note   Isa Sewell  is requesting a refill authorization.  Brief Assessment and Rationale for Refill:  Approve     Medication Therapy Plan:         Comments:     Note composed:2:41 PM 06/24/2024

## 2024-06-30 RX ORDER — ATORVASTATIN CALCIUM 40 MG/1
40 TABLET, FILM COATED ORAL
Qty: 90 TABLET | Refills: 1 | Status: SHIPPED | OUTPATIENT
Start: 2024-06-30

## 2024-06-30 NOTE — TELEPHONE ENCOUNTER
Refill Decision Note   Isa Sewell  is requesting a refill authorization.  Brief Assessment and Rationale for Refill:  Approve     Medication Therapy Plan:         Comments:     Note composed:5:03 AM 06/30/2024

## 2024-06-30 NOTE — TELEPHONE ENCOUNTER
No care due was identified.  Health [facility] Embedded Care Due Messages. Reference number: [id number].   6/30/2024 4:10:37 AM CDT

## 2024-08-22 ENCOUNTER — TELEPHONE (OUTPATIENT)
Dept: PRIMARY CARE CLINIC | Facility: CLINIC | Age: 89
End: 2024-08-22
Payer: MEDICARE

## 2024-08-22 DIAGNOSIS — R73.03 BORDERLINE DIABETES: ICD-10-CM

## 2024-08-22 DIAGNOSIS — I10 BENIGN ESSENTIAL HTN: ICD-10-CM

## 2024-08-22 DIAGNOSIS — Z00.00 ANNUAL PHYSICAL EXAM: Primary | ICD-10-CM

## 2024-08-22 DIAGNOSIS — N18.31 STAGE 3A CHRONIC KIDNEY DISEASE: ICD-10-CM

## 2024-08-22 DIAGNOSIS — E78.2 MIXED HYPERLIPIDEMIA: ICD-10-CM

## 2024-08-22 NOTE — TELEPHONE ENCOUNTER
----- Message from Argelia Turner sent at 8/22/2024 10:35 AM CDT -----  Regarding: Pt's Valorie Navarro called to request blood work orders for the pt for her annual physical appt and she would like a call back once the order is ready for scheduling  Order Message Request:    Pt's Valorie Navarro called to request blood work orders for the pt for her annual physical appt and she would like a call back once the order is ready for scheduling    Ms Navarro can be reached at [phone]

## 2024-09-01 DIAGNOSIS — G40.309 NONINTRACTABLE GENERALIZED IDIOPATHIC EPILEPSY WITHOUT STATUS EPILEPTICUS: ICD-10-CM

## 2024-09-01 DIAGNOSIS — Z86.718 HISTORY OF DVT (DEEP VEIN THROMBOSIS): ICD-10-CM

## 2024-09-01 DIAGNOSIS — I48.0 PAROXYSMAL ATRIAL FIBRILLATION: ICD-10-CM

## 2024-09-03 RX ORDER — LEVETIRACETAM 500 MG/1
500 TABLET ORAL 2 TIMES DAILY
Qty: 180 TABLET | Refills: 0 | Status: SHIPPED | OUTPATIENT
Start: 2024-09-03

## 2024-09-03 RX ORDER — APIXABAN 5 MG/1
5 TABLET, FILM COATED ORAL 2 TIMES DAILY
Qty: 180 TABLET | Refills: 0 | Status: SHIPPED | OUTPATIENT
Start: 2024-09-03

## 2024-09-15 ENCOUNTER — PATIENT MESSAGE (OUTPATIENT)
Dept: PRIMARY CARE CLINIC | Facility: CLINIC | Age: 89
End: 2024-09-15
Payer: MEDICARE
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
## 2024-10-06 ENCOUNTER — E-VISIT (OUTPATIENT)
Dept: PRIMARY CARE CLINIC | Facility: CLINIC | Age: 89
End: 2024-10-06
Payer: MEDICARE

## 2024-10-06 DIAGNOSIS — R30.0 DYSURIA: Primary | ICD-10-CM

## 2024-10-07 RX ORDER — CIPROFLOXACIN 250 MG/1
250 TABLET, FILM COATED ORAL 2 TIMES DAILY
Qty: 10 TABLET | Refills: 0 | Status: SHIPPED | OUTPATIENT
Start: 2024-10-07

## 2024-10-07 NOTE — PROGRESS NOTES
Patient ID: Isa Sewell is a 89 y.o. female.    Chief Complaint: General Illness (Entered automatically based on patient selection in [facility].)    The patient initiated a request through [facility] on 10/6/2024 for evaluation and management with a chief complaint of General Illness (Entered automatically based on patient selection in [facility].)     I evaluated the questionnaire submission on 10/7/2024.    [facility] Evisit [facility]group-Common Problems    10/6/2024  2:21 PM CDT - Filed by Patient   What do you need help with? Urinary Symptoms   Do you agree to participate in an E-Visit? Yes   If you have any of the following symptoms, please present to your local emergency room or call 911:  I acknowledge   What is the main issue you would like addressed today? UTI would like to submit a specimen to [facility] in [place].  Need orders sent for patient.   What symptoms do you currently have? Pain while passing urine   When did your symptoms first appear? 10/4/2024   List what you have done or taken to help your symptoms. Nothing   Please indicate whether you have had the following symptoms during the past 24 hours     Urgent urination (a sudden and uncontrollable urge to urinate) Moderate   Frequent urination of small amounts of urine (going to the toilet very often) Moderate   Burning pain when urinating Severe   Incomplete bladder emptying (still feel like you need to urinate again after urination) Moderate   Pain not associated with urination in the lower abdomen below the belly button) Moderate   What does your urine look like? Cloudy   Blood seen in the urine None   Flank pain (pain in one or both sides of the lower back) None   Abnormal Vaginal Discharge (abnormal amount, color and/or odor) None   Discharge from the urethra (urinary opening) without urination None   High body temperature/fever? None-<99.5   Please rate how much discomfort you have experience because of the symptoms in the past 24 hours:  Moderate   Please indicate how the symptoms have interfered with your every day activities/work in the past 24 hours: Moderate   Please indicate how these symptoms have interfered with your social activities (visiting people, meeting with friends, etc.) in the past 24 hours? Mild   Are you a diabetic? No   Please indicate whether you have the following at the time of completion of this questionnaire: None of the above   Provide any additional information you feel is important.    Please attach any relevant images or files (if you have performed a home test for UTI, please submit a photo of results)    Are you able to take your vital signs? No         Recent Labs Obtained:  No visits with results within 7 Day(s) from this visit.   Latest known visit with results is:   Lab Visit on 11/03/2023   Component Date Value Ref Range Status    Cholesterol 11/03/2023 139  120 - 199 mg/dL Final    Comment: The National Cholesterol Education Program (NCEP) has set the  following guidelines (reference ranges) for Cholesterol:  Optimal.....................<200 mg/dL  Borderline High.............200-239 mg/dL  High........................> or = 240 mg/dL      Triglycerides 11/03/2023 98  30 - 150 mg/dL Final    Comment: The National Cholesterol Education Program (NCEP) has set the  following guidelines (reference values) for triglycerides:  Normal......................<150 mg/dL  Borderline High.............150-199 mg/dL  High........................200-499 mg/dL      HDL 11/03/2023 48  40 - 75 mg/dL Final    Comment: The National Cholesterol Education Program (NCEP) has set the  following guidelines (reference values) for HDL Cholesterol:  Low...............<40 mg/dL  Optimal...........>60 mg/dL      LDL Cholesterol 11/03/2023 71.4  63.0 - 159.0 mg/dL Final    Comment: The National Cholesterol Education Program (NCEP) has set the  following guidelines (reference values) for LDL Cholesterol:  Optimal.......................<130  mg/dL  Borderline High...............130-159 mg/dL  High..........................160-189 mg/dL  Very High.....................>190 mg/dL      HDL/Cholesterol Ratio 11/03/2023 34.5  20.0 - 50.0 % Final    Total Cholesterol/HDL Ratio 11/03/2023 2.9  2.0 - 5.0 Final    Non-HDL Cholesterol 11/03/2023 91  mg/dL Final    Comment: Risk category and Non-HDL cholesterol goals:  Coronary heart disease (CHD)or equivalent (10-year risk of CHD >20%):  Non-HDL cholesterol goal     <130 mg/dL  Two or more CHD risk factors and 10-year risk of CHD <= 20%:  Non-HDL cholesterol goal     <160 mg/dL  0 to 1 CHD risk factor:  Non-HDL cholesterol goal     <190 mg/dL      Sodium 11/03/2023 146 (H)  136 - 145 mmol/L Final    Potassium 11/03/2023 4.3  3.5 - 5.1 mmol/L Final    Chloride 11/03/2023 109  95 - 110 mmol/L Final    CO2 11/03/2023 26  23 - 29 mmol/L Final    Glucose 11/03/2023 104  70 - 110 mg/dL Final    BUN 11/03/2023 18 (H)  7 - 17 mg/dL Final    Creatinine 11/03/2023 0.95  0.50 - 1.40 mg/dL Final    Calcium 11/03/2023 8.9  8.7 - 10.5 mg/dL Final    Total Protein 11/03/2023 7.4  6.0 - 8.4 g/dL Final    Albumin 11/03/2023 4.1  3.5 - 5.2 g/dL Final    Total Bilirubin 11/03/2023 0.5  0.1 - 1.0 mg/dL Final    Comment: For infants and newborns, interpretation of results should be based  on gestational age, weight and in agreement with clinical  observations.    Premature Infant recommended reference ranges:  Up to 24 hours.............<8.0 mg/dL  Up to 48 hours............<12.0 mg/dL  3-5 days..................<15.0 mg/dL  6-29 days.................<15.0 mg/dL      Alkaline Phosphatase 11/03/2023 132 (H)  38 - 126 U/L Final    AST 11/03/2023 34  15 - 46 U/L Final    ALT 11/03/2023 26  10 - 44 U/L Final    Anion Gap 11/03/2023 11  8 - 16 mmol/L Final    eGFR 11/03/2023 56.2 (A)  >60 mL/min/1.73 m^2 Final    WBC 11/03/2023 6.07  3.90 - 12.70 K/uL Final    RBC 11/03/2023 4.26  4.00 - 5.40 M/uL Final    Hemoglobin 11/03/2023 13.4  12.0  - 16.0 g/dL Final    Hematocrit 11/03/2023 42.0  37.0 - 48.5 % Final    MCV 11/03/2023 99 (H)  82 - 98 fL Final    MCH 11/03/2023 31.5 (H)  27.0 - 31.0 pg Final    MCHC 11/03/2023 31.9 (L)  32.0 - 36.0 g/dL Final    RDW 11/03/2023 12.8  11.5 - 14.5 % Final    Platelets 11/03/2023 190  150 - 450 K/uL Final    MPV 11/03/2023 10.0  9.2 - 12.9 fL Final    Hemoglobin A1C 11/03/2023 5.9 (H)  4.0 - 5.6 % Final    Comment: ADA Screening Guidelines:  5.7-6.4%  Consistent with prediabetes  >or=6.5%  Consistent with diabetes    High levels of fetal hemoglobin interfere with the HbA1C  assay. Heterozygous hemoglobin variants (HbS, HgC, etc)do  not significantly interfere with this assay.   However, presence of multiple variants may affect accuracy.      Estimated Avg Glucose 11/03/2023 123  68 - 131 mg/dL Final    TSH 11/03/2023 4.750 (H)  0.400 - 4.000 uIU/mL Final    Comment: Warning:  Heterophilic antibodies in serum or plasma of   certain individuals are known to cause interference with   immunoassays. These antibodies may be present in blood samples   from individuals regularly exposed to animal or who have been   treated with animal products.     Patients taking high doses of supplemental biotin may have  negatively biased results.       Free T4 11/03/2023 0.95  0.71 - 1.51 ng/dL Final       Encounter Diagnosis   Name Primary?    Dysuria Yes        Orders Placed This Encounter   Procedures    Urine culture     Standing Status:   Future     Standing Expiration Date:   1/4/2026     Order Specific Question:   Send normal result to authorizing provider's In Basket if patient is active on MyChart:     Answer:   Yes    Urinalysis     Standing Status:   Future     Standing Expiration Date:   12/5/2025     Order Specific Question:   Collection Type     Answer:   Urine, Clean Catch      Medications Ordered This Encounter   Medications    ciprofloxacin HCl (CIPRO) 250 MG tablet     Sig: Take 1 tablet (250 mg total) by mouth 2 (two)  times daily.     Dispense:  10 tablet     Refill:  0        No follow-ups on file.      E-Visit Time Tracking:

## 2024-11-15 DIAGNOSIS — L10.9 PEMPHIGUS: ICD-10-CM

## 2024-11-15 RX ORDER — CLOBETASOL PROPIONATE 0.5 MG/G
1 OINTMENT TOPICAL 2 TIMES DAILY
Qty: 60 G | Refills: 3 | Status: SHIPPED | OUTPATIENT
Start: 2024-11-15

## 2024-11-15 NOTE — TELEPHONE ENCOUNTER
Care Due:                  Date            Visit Type   Department     Provider  --------------------------------------------------------------------------------                                EP -                              PRIMARY      [facility]  Last Visit: 11-      CARE ([facility])   CARE           Dariel Thurman                              EP -                              PRIMARY      [facility] PRIMARY  Next Visit: 01-      CARE ([facility])   CARE           Dariel Thurman                                                            Last  Test          Frequency    Reason                     Performed    Due Date  --------------------------------------------------------------------------------    CBC.........  12 months..  clopidogreL..............  11-   10-    CMP.........  12 months..  atorvastatin, olmesartan.  11-   10-    Lipid Panel.  12 months..  atorvastatin.............  11-   10-    Health [facility] Embedded Care Due Messages. Reference number: [id number].   11/15/2024 12:38:13 PM CST  
Please see the attached refill request.  
Satisfactory

## 2024-11-26 DIAGNOSIS — G40.309 NONINTRACTABLE GENERALIZED IDIOPATHIC EPILEPSY WITHOUT STATUS EPILEPTICUS: ICD-10-CM

## 2024-11-26 DIAGNOSIS — I48.0 PAROXYSMAL ATRIAL FIBRILLATION: ICD-10-CM

## 2024-11-26 DIAGNOSIS — Z86.718 HISTORY OF DVT (DEEP VEIN THROMBOSIS): ICD-10-CM

## 2024-11-26 RX ORDER — APIXABAN 5 MG/1
5 TABLET, FILM COATED ORAL 2 TIMES DAILY
Qty: 180 TABLET | Refills: 0 | Status: SHIPPED | OUTPATIENT
Start: 2024-11-26

## 2024-11-26 RX ORDER — PHENOL/SODIUM PHENOLATE
1 AEROSOL, SPRAY (ML) MUCOUS MEMBRANE EVERY MORNING
Qty: 90 EACH | Refills: 3 | Status: CANCELLED | OUTPATIENT
Start: 2024-11-26

## 2024-11-26 RX ORDER — LEVETIRACETAM 500 MG/1
500 TABLET ORAL 2 TIMES DAILY
Qty: 180 TABLET | Refills: 0 | Status: SHIPPED | OUTPATIENT
Start: 2024-11-26

## 2024-11-26 NOTE — TELEPHONE ENCOUNTER
No care due was identified.  Health [facility] Embedded Care Due Messages. Reference number: [id number].   11/26/2024 10:05:22 AM CST

## 2024-12-13 RX ORDER — PHENOL/SODIUM PHENOLATE
1 AEROSOL, SPRAY (ML) MUCOUS MEMBRANE EVERY MORNING
Qty: 90 EACH | Refills: 3 | Status: SHIPPED | OUTPATIENT
Start: 2024-12-13

## 2024-12-13 NOTE — TELEPHONE ENCOUNTER
No care due was identified.  Health [facility] Embedded Care Due Messages. Reference number: [id number].   12/13/2024 7:41:31 AM CST

## 2024-12-18 RX ORDER — PHENOL/SODIUM PHENOLATE
1 AEROSOL, SPRAY (ML) MUCOUS MEMBRANE EVERY MORNING
Qty: 90 EACH | Refills: 3 | OUTPATIENT
Start: 2024-12-18

## 2024-12-18 NOTE — TELEPHONE ENCOUNTER
No care due was identified.  Health [facility] Embedded Care Due Messages. Reference number: [id number].   12/18/2024 8:10:29 AM CST

## 2024-12-26 NOTE — TELEPHONE ENCOUNTER
No care due was identified.  Health [facility] Embedded Care Due Messages. Reference number: [id number].   12/26/2024 4:18:33 AM CST

## 2024-12-26 NOTE — TELEPHONE ENCOUNTER
Refill Routing Note   Medication(s) are not appropriate for processing by [facility] for the following reason(s):        Required labs outdated    [facility] action(s):  Defer             Appointments  past 12m or future 3m with PCP    Date Provider   Last Visit   11/6/2023 Dariel Obregon MD   Next Visit   1/13/2025 Dariel Obregon MD   ED visits in past 90 days: 0        Note composed:8:07 AM 12/26/2024

## 2024-12-27 RX ORDER — CLOPIDOGREL BISULFATE 75 MG/1
75 TABLET ORAL DAILY
Qty: 90 TABLET | Refills: 3 | Status: SHIPPED | OUTPATIENT
Start: 2024-12-27

## 2024-12-27 RX ORDER — ATORVASTATIN CALCIUM 40 MG/1
40 TABLET, FILM COATED ORAL
Qty: 90 TABLET | Refills: 3 | Status: SHIPPED | OUTPATIENT
Start: 2024-12-27

## 2025-01-13 ENCOUNTER — OFFICE VISIT (OUTPATIENT)
Dept: PRIMARY CARE CLINIC | Facility: CLINIC | Age: OVER 89
End: 2025-01-13
Payer: MEDICARE

## 2025-01-13 VITALS
BODY MASS INDEX: 26.49 KG/M2 | WEIGHT: 143.94 LBS | DIASTOLIC BLOOD PRESSURE: 70 MMHG | HEIGHT: 62 IN | SYSTOLIC BLOOD PRESSURE: 122 MMHG

## 2025-01-13 DIAGNOSIS — I69.359 HEMIPLEGIA AND HEMIPARESIS FOLLOWING CEREBRAL INFARCTION AFFECTING UNSPECIFIED SIDE: ICD-10-CM

## 2025-01-13 DIAGNOSIS — G40.309 NONINTRACTABLE GENERALIZED IDIOPATHIC EPILEPSY WITHOUT STATUS EPILEPTICUS: ICD-10-CM

## 2025-01-13 DIAGNOSIS — Z86.73 HX OF ISCHEMIC RIGHT MCA STROKE: ICD-10-CM

## 2025-01-13 DIAGNOSIS — L10.9 PEMPHIGUS: ICD-10-CM

## 2025-01-13 DIAGNOSIS — N18.31 STAGE 3A CHRONIC KIDNEY DISEASE: ICD-10-CM

## 2025-01-13 DIAGNOSIS — E78.2 MIXED HYPERLIPIDEMIA: ICD-10-CM

## 2025-01-13 DIAGNOSIS — I48.0 PAROXYSMAL ATRIAL FIBRILLATION: ICD-10-CM

## 2025-01-13 DIAGNOSIS — Z86.718 HISTORY OF DVT (DEEP VEIN THROMBOSIS): Primary | ICD-10-CM

## 2025-01-13 DIAGNOSIS — I10 BENIGN ESSENTIAL HTN: ICD-10-CM

## 2025-01-13 PROCEDURE — 99999 PR PBB SHADOW E&M-EST. PATIENT-LVL III: CPT | Mod: PBBFAC,,, | Performed by: INTERNAL MEDICINE

## 2025-01-13 PROCEDURE — 99214 OFFICE O/P EST MOD 30 MIN: CPT | Mod: S$PBB,,, | Performed by: INTERNAL MEDICINE

## 2025-01-13 PROCEDURE — 99213 OFFICE O/P EST LOW 20 MIN: CPT | Mod: PBBFAC | Performed by: INTERNAL MEDICINE

## 2025-01-13 NOTE — PROGRESS NOTES
[facility] Note    Chief Complaint      Chief Complaint   Patient presents with    Annual Exam       History of Present Illness      History of Present Illness    CHIEF COMPLAINT:  Ms. Sewell presents today for follow-up    GENITOURINARY:  She is prone to bladder infections and takes cranberry capsules for urinary tract infection prevention.    CARDIOVASCULAR:  She denies any heart problems. She is currently taking a blood thinner.    DIET:  Her breakfast consists of one egg, half a grit, a slice of raisin bread, and fruit or water.      ROS:  Cardiovascular: -chest pain       A fib: Sees Dr. Chau.  On eliquis for anticoagulation.  Still off coreg 2/2 syncope.  Denies chest pain, shortness of breath, palpitations.    HTN: BP at goal on benicar.  Coreg, amlodipine previously.  HLD: controlled with lipitor.  LDL 68  Hx of stroke: On eliquis and plavix for secondary prevention.  No new deficits.  Hx of DVT: Sees Dr. Min.  On eliquis for anticoagulation.  Will not likely need continued hematology     Epilepsy: Sees Dr. Mata, neurology.  On keppra.  No recent seizure activity  IFG: A1C 5.8.    Pemphigus: Sees Dr. Sepulveda, but not recently.  On clobetasol cream for maintenance.    Flu shot due.  Prevnar, pneumovax UTD.  COVID vaccine UTD.    Mammograms and cscopes N/A 2/2 age.     Assessment/Plan     Isa Sewell is a 89 y.o.female with:    Assessment & Plan      NONINTRACTABLE GENERALIZED IDIOPATHIC EPILEPSY WITHOUT STATUS EPILEPTICUS:  - Ms. Sewell experienced one seizure following a stroke and is currently taking anticonvulsant medication for prevention.  - Advised against driving due to seizure risk.  - Instructed patient to report any changes in seizure frequency or severity.    PAROXYSMAL ATRIAL FIBRILLATION:  - Ms. Sewell reports no current heart problems.  - Confirmed anticoagulant medication is being taken as prescribed.  - Advised patient to monitor for any symptoms of atrial  fibrillation and report them immediately.    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 Sewell has a history of stroke 7 years ago.  - Assessed for residual neurological deficits.  - Recommend continued physical therapy and occupational therapy as needed.  - Reiterated advice against driving due to combined history of stroke and seizure.    ANTICOAGULATION MANAGEMENT:  - Reviewed potential side effects and drug interactions of the anticoagulant.  - Advised the patient to maintain regular INR monitoring as scheduled.    STROKE:  - Reviewed stroke prevention strategies, including blood pressure control and lifestyle modifications.    VITAMIN D SUPPLEMENTATION:  - Recommend continuation of vitamin D supplementation.  - Advised on dietary sources of vitamin D and safe sun exposure.  - Considered scheduling a follow-up vitamin D level test to monitor improvement.      1. Paroxysmal atrial fibrillation    2. Hemiplegia and hemiparesis following cerebral infarction affecting unspecified side    3. Stage 3a chronic kidney disease    4. Pemphigus    5. 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 History of DVT (deep vein thrombosis)    7. Benign essential HTN    8. Mixed hyperlipidemia    9. Hx of ischemic right MCA stroke      Chronic conditions status updated as per HPI.  Other than changes above, cont current medications and maintain follow up with specialists.  Follow up in about 1 year (around 1/13/2026) for Follow up visit.    No future appointments.        Past Medical History:  Past Medical History:   Diagnosis Date    Bullous pemphigoid     pemphigoid nodularis variant    DVT (deep venous thrombosis)     Dysphagia 6/12/2018    Embolic stroke involving right middle cerebral artery 6/9/2018    Hypertension     Pemphigus 4/4/2016    Seizure 10/19/2018       Past Surgical History:   has a past surgical history that includes Hysterectomy;  Tonsillectomy; Cholecystectomy; Fracture surgery (Right); and Breast biopsy (Bilateral).    Family History:  family history is not on file.     Social History:  Social History     Tobacco Use    Smoking status: Never    Smokeless tobacco: Never   Substance Use Topics    Alcohol use: No    Drug use: No       Medications:  Outpatient Encounter Medications as of 1/13/2025   Medication Sig Dispense Refill    amLODIPine (NORVASC) 5 MG tablet Take 1 tablet (5 mg total) by mouth once daily. 90 tablet 3    atorvastatin (LIPITOR) 40 MG tablet Take 1 tablet by mouth once daily. 90 tablet 3    cetirizine (ZYRTEC) 10 MG tablet Take 10 mg by mouth once daily.      ciprofloxacin HCl (CIPRO) 250 MG tablet Take 1 tablet (250 mg total) by mouth 2 (two) times daily. (Patient not taking: Reported on 1/13/2025) 10 tablet 0    clobetasol 0.05% (TEMOVATE) 0.05 % Oint Apply 1 application  topically 2 (two) times daily. Apply to affected area 60 g 3    clopidogreL (PLAVIX) 75 mg tablet Take 1 tablet (75 mg total) by mouth once daily. 90 tablet 3    ELIQUIS 5 mg Tab Take 1 tablet by mouth twice daily. 180 tablet 0    levETIRAcetam (KEPPRA) 500 MG Tab Take 1 tablet by mouth twice daily. 180 tablet 0    melatonin 3 mg Tab Take 1 tablet (3 mg total) by mouth every evening. 90 tablet 3    olmesartan (BENICAR) 40 MG tablet Take 1 tablet (40 mg total) by mouth once daily. 90 tablet 3    omeprazole 20 mg TbEC Take 1 tablet by mouth every morning. 90 each 3    pulse oximeter (PULSE OXIMETER) device by Apply Externally route 2 (two) times a day. Use twice daily at 8 AM and 3 PM and [facility] the [facility] as directed. 1 each 0    triamcinolone acetonide 0.1% (KENALOG) 0.1 % ointment   3    [DISCONTINUED] atorvastatin (LIPITOR) 40 MG tablet Take 1 tablet by mouth once daily. 90 tablet 1    [DISCONTINUED] clopidogreL (PLAVIX) 75 mg tablet Take 1 tablet (75 mg total) by mouth once daily. 90 tablet 3     No facility-administered encounter  "medications on file as of 1/13/2025.       Allergies:  Review of patient's allergies indicates:   Allergen Reactions    Sulfa (sulfonamide antibiotics) Hives       Health Maintenance:  Immunization History   Administered Date(s) Administered    COVID-19, MRNA, LN-S, PF (Pfizer) (Purple Cap) 04/15/2021, 05/06/2021    Influenza 10/24/2013, 11/04/2014    Influenza (FLUAD) - Quadrivalent - Adjuvanted - PF *Preferred* (65+) 10/13/2020    Influenza - Quadrivalent 11/14/2017    Influenza - Trivalent - Afluria, Fluzone MDV 11/06/2009, 11/29/2012    Influenza - Trivalent - Fluad - Adjuvanted - PF (65 years and older 09/27/2018    Influenza - Trivalent - Fluzone High Dose - PF (65 years and older) 10/15/2019    Influenza A (H1N1) 2009 Monovalent - IM - PF 11/06/2009    Pneumococcal Conjugate - 13 Valent 10/12/2018      Health Maintenance   Topic Date Due    TETANUS VACCINE  Never done    Shingles Vaccine (1 of 2) Never done    RSV Vaccine (Age 60+ and Pregnant patients) (1 - 1-dose 75+ series) Never done    Pneumococcal Vaccines (Age 50+) (2 of 2 - PPSV23) 10/12/2019    Influenza Vaccine (1) 09/01/2024    COVID-19 Vaccine (3 - 2024-25 season) 09/01/2024    Hemoglobin A1c (Prediabetes)  01/02/2026    Lipid Panel  01/02/2030        Physical Exam      Vital Signs  BP: 122/70  BP Location: Right arm  Patient Position: Sitting  Pain Score: 0-No pain  Height and Weight  Height: 5' 2" (157.5 cm)  Weight: 65.3 kg (143 lb 15.4 oz)  BSA (Calculated - sq m): 1.69 sq meters  BMI (Calculated): 26.3  Weight in (lb) to have BMI = 25: 136.4]    Physical Exam    General: No acute distress. Well-developed. Well-nourished.  Eyes: EOMI. Sclerae anicteric.  HENT: Normocephalic. Atraumatic. Nares patent. Moist oral mucosa.  Ears: Bilateral TMs clear. Bilateral EACs clear.  Cardiovascular: Regular rate. Regular rhythm. No murmurs. No rubs. No gallops. Normal S1, S2.  Respiratory: Normal respiratory effort. Clear to auscultation bilaterally. No " rales. No rhonchi. No wheezing.  Abdomen: Soft. Non-tender. Non-distended. Normoactive bowel sounds.  Musculoskeletal: No  obvious deformity.  Extremities: No lower extremity edema.  Neurological: Alert & oriented x3. No slurred speech. Normal gait.  Psychiatric: Normal mood. Normal affect. Good insight. Good judgment.  Skin: Warm. Dry. No rash.         Physical Exam  Vitals reviewed.   Constitutional:       Appearance: She is well-developed.   HENT:      Head: Normocephalic and atraumatic.      Right Ear: External ear normal.      Left Ear: External ear normal.   Cardiovascular:      Rate and Rhythm: Normal rate and regular rhythm.      Heart sounds: Normal heart sounds. No murmur heard.  Pulmonary:      Effort: Pulmonary effort is normal.      Breath sounds: Normal breath sounds. No wheezing or rales.   Abdominal:      General: Bowel sounds are normal. There is no distension.      Palpations: Abdomen is soft.      Tenderness: There is no abdominal tenderness.         Laboratory:    Results              CBC:  Recent Labs   Lab 09/15/22  0806 11/03/23  0755 01/02/25  0757   WBC 6.77 6.07 7.21   RBC 4.16 4.26 3.93 L   Hemoglobin 12.6 13.4 12.2   Hematocrit 39.3 42.0 38.4   Platelets 208 190 177   MCV 95 99 H 98   MCH 30.3 31.5 H 31.0   MCHC 32.1 31.9 L 31.8 L     CMP:  Recent Labs   Lab 09/15/22  0806 11/03/23  0756 01/02/25  0757   Glucose 99 104 105   Calcium 9.0 8.9 9.4   Albumin 4.3 4.1 3.6   Total Protein 7.1 7.4 6.9   Sodium 146 H 146 H 148 H   Potassium 4.3 4.3 4.0   CO2 28 26 26   Chloride 109 109 113 H   BUN 22 H 18 H 13   Alkaline Phosphatase 130 H 132 H 120   ALT 19 26 17   AST 29 34 23   Total Bilirubin 0.7 0.5 0.5     URINALYSIS:  Recent Labs   Lab 12/23/22  1323 09/26/23  1417 10/07/24  0824   Color, UA [place] A   < > Pia   Specific Gravity, UA 1.025   < > 1.015   pH, UA 5.0   < > 6.0   Protein, UA 1+ A   < > Negative   Bacteria Many A  --   --    Nitrite, UA Positive A   < > Negative   Leukocytes, UA  Trace A   < > Negative   Urobilinogen, UA Negative   < > Negative   Hyaline Casts, UA 0  --   --     < > = values in this interval not displayed.      LIPIDS:  Recent Labs   Lab 09/15/22  0806 11/03/23  0756 01/02/25  0757   TSH  --  4.750 H  --    HDL 43 48 43   Cholesterol 139 139 125   Triglycerides 80 98 72   LDL Cholesterol 80.0 71.4 67.6   HDL/Cholesterol Ratio 30.9 34.5 34.4   Non-HDL Cholesterol 96 91 82   Total Cholesterol/HDL Ratio 3.2 2.9 2.9     TSH:  Recent Labs   Lab 11/03/23  0756   TSH 4.750 H     A1C:  Recent Labs   Lab 11/03/23  0756 01/02/25  0757   Hemoglobin A1C 5.9 H 5.8 H           This note was generated with the assistance of ambient listening technology. Verbal consent was obtained by the patient and accompanying visitor(s) for the recording of patient appointment to facilitate this note. I attest to having reviewed and edited the generated note for accuracy, though some syntax or spelling errors may persist. Please contact the author of this note for any clarification.      Dariel Obregon MD  [facility]

## 2025-01-23 ENCOUNTER — PATIENT MESSAGE (OUTPATIENT)
Dept: PRIMARY CARE CLINIC | Facility: CLINIC | Age: OVER 89
End: 2025-01-23
Payer: MEDICARE

## 2025-01-23 DIAGNOSIS — U07.1 COVID: Primary | ICD-10-CM

## 2025-01-23 NOTE — TELEPHONE ENCOUNTER
SEnding in Molnupiravir (if it's available).  Ok to take tylenol or advil for fever.  Ok to take zyrtec for runny nose.  Ok to take delsym for cough. Ok to take mucinex for chest congestion.

## 2025-01-23 NOTE — TELEPHONE ENCOUNTER
Daughter reports that pt is COVID positive, c/o runny nose and vomiting, temp 100 on yesterday    Would like to know what pt can take over the counter to assist with symptoms

## 2025-02-06 ENCOUNTER — PATIENT MESSAGE (OUTPATIENT)
Dept: PRIMARY CARE CLINIC | Facility: CLINIC | Age: OVER 89
End: 2025-02-06
Payer: MEDICARE

## 2025-02-06 DIAGNOSIS — G40.309 NONINTRACTABLE GENERALIZED IDIOPATHIC EPILEPSY WITHOUT STATUS EPILEPTICUS: ICD-10-CM

## 2025-02-06 DIAGNOSIS — I10 BENIGN ESSENTIAL HTN: Primary | ICD-10-CM

## 2025-02-07 RX ORDER — OLMESARTAN MEDOXOMIL 40 MG/1
40 TABLET ORAL DAILY
Qty: 30 TABLET | Refills: 0 | Status: SHIPPED | OUTPATIENT
Start: 2025-02-07 | End: 2025-02-14 | Stop reason: SDUPTHER

## 2025-02-07 RX ORDER — OLMESARTAN MEDOXOMIL 40 MG/1
40 TABLET ORAL DAILY
Qty: 90 TABLET | Refills: 1 | Status: SHIPPED | OUTPATIENT
Start: 2025-02-07 | End: 2025-02-07 | Stop reason: SDUPTHER

## 2025-02-07 NOTE — TELEPHONE ENCOUNTER
Refill Decision Note   Isa Sewell  is requesting a refill authorization.  Brief Assessment and Rationale for Refill:  Approve     Medication Therapy Plan: Sending partial to cover while waiting for mail order.      Comments:     Note composed:4:11 PM 02/07/2025

## 2025-02-07 NOTE — TELEPHONE ENCOUNTER
LOV with Dariel Obregon MD , 1/13/2025  Pt will be out of meds today and needs a temp rx sent to local [facility] while she awaits the shipment from [facility]

## 2025-02-07 NOTE — TELEPHONE ENCOUNTER
No care due was identified.  Health [facility] Embedded Care Due Messages. Reference number: [id number].   2/07/2025 9:49:15 AM CST

## 2025-02-14 DIAGNOSIS — I10 BENIGN ESSENTIAL HTN: ICD-10-CM

## 2025-02-14 RX ORDER — LEVETIRACETAM 500 MG/1
500 TABLET ORAL 2 TIMES DAILY
Qty: 180 TABLET | Refills: 3 | Status: SHIPPED | OUTPATIENT
Start: 2025-02-14

## 2025-02-14 RX ORDER — OLMESARTAN MEDOXOMIL 40 MG/1
40 TABLET ORAL DAILY
Qty: 90 TABLET | Refills: 3 | Status: SHIPPED | OUTPATIENT
Start: 2025-02-14 | End: 2025-02-14 | Stop reason: SDUPTHER

## 2025-02-14 RX ORDER — OLMESARTAN MEDOXOMIL 40 MG/1
40 TABLET ORAL DAILY
Qty: 90 TABLET | Refills: 3 | Status: SHIPPED | OUTPATIENT
Start: 2025-02-14

## 2025-02-14 NOTE — TELEPHONE ENCOUNTER
----- Message from Milli sent at 2/14/2025  8:24 AM CST -----  Contact: [phone]  Pt daughter states [facility]  advised this Rx was cancelled by [facility]. Pt daughter states this needs to be resolved before the pt next pill pack shipment ships for March. Pt daughter is also asking to check the patient's other medications for refills. Please call and advise.     Requesting an RX refill or new RX.    Is this a refill or new RX: new    RX name and strength (copy/paste from chart):  olmesartan (BENICAR) 40 MG tablet    Is this a 30 day or 90 day RX:     Pharmacy name and phone # (copy/paste from chart):     [facility] [address]  [facility] [address]  Phone: [phone] Fax: [phone]          The doctors have asked that we provide their patients with the following 2 reminders -- prescription refills can take up to 72 hours, and a friendly reminder that in the future you can use your [facility] account to request refills: yes

## 2025-02-14 NOTE — TELEPHONE ENCOUNTER
Pt requesting 90 day supply of pended meds to be sent to [facility]. Pt states her last one was cancelled when the short term rx was sent in. Pended for your review     LOV with Dariel Obregon MD , 1/13/2025

## 2025-02-14 NOTE — TELEPHONE ENCOUNTER
No care due was identified.  Health [facility] Embedded Care Due Messages. Reference number: [id number].   2/14/2025 8:38:05 AM CST

## 2025-02-19 RX ORDER — AMLODIPINE BESYLATE 5 MG/1
5 TABLET ORAL DAILY
Qty: 90 TABLET | Refills: 3 | Status: SHIPPED | OUTPATIENT
Start: 2025-02-19

## 2025-02-19 NOTE — TELEPHONE ENCOUNTER
No care due was identified.  Health [facility] Embedded Care Due Messages. Reference number: [id number].   2/19/2025 10:26:03 AM CST

## 2025-02-19 NOTE — TELEPHONE ENCOUNTER
----- Message from Diane sent at 2/19/2025 10:16 AM CST -----  Contact: [facility] /Arleth/[phone]  Requesting an RX refill or new RX.Is this a refill or new RX: NewRX name and strength:  amLODIPine (NORVASC) 5 MG tabletIs this a 30 day or 90 day RX: 90Pharmacy name and phone # :  [facility] [place] [address] [employer] [facility] [employer] [address] [address]Rwifz: [phone] Fax: [phone] doctors have asked that we provide their patients with the following 2 reminders -- prescription refills can take up to 72 hours, and a friendly reminder that in the future you can use your [facility] account to request refills: Requesting an RX refill or new RX.Is this a refill or new RX: NewRX name and strength :  carvediloL (COREG) 6.25 MG tabletIs this a 30 day or 90 day RX: 90Pharmacy name and phone # :  [facility] [place] [address] [employer] [facility] [employer] [facility] [address] [address]Adfsc: [phone] Fax: [phone] doctors have asked that we provide their patients with the following 2 reminders -- prescription refills can take up to 72 hours, and a friendly reminder that in the future you can use your [facility] account to request refills: Arleth said that pt has been waiting for the medication since 2/13

## 2025-02-21 DIAGNOSIS — Z00.00 ENCOUNTER FOR MEDICARE ANNUAL WELLNESS EXAM: ICD-10-CM

## 2025-02-24 DIAGNOSIS — Z86.718 HISTORY OF DVT (DEEP VEIN THROMBOSIS): ICD-10-CM

## 2025-02-24 DIAGNOSIS — I48.0 PAROXYSMAL ATRIAL FIBRILLATION: ICD-10-CM

## 2025-02-24 RX ORDER — APIXABAN 5 MG/1
5 TABLET, FILM COATED ORAL 2 TIMES DAILY
Qty: 180 TABLET | Refills: 3 | Status: SHIPPED | OUTPATIENT
Start: 2025-02-24

## 2025-02-27 ENCOUNTER — PATIENT MESSAGE (OUTPATIENT)
Dept: PRIMARY CARE CLINIC | Facility: CLINIC | Age: OVER 89
End: 2025-02-27
Payer: MEDICARE

## 2025-06-30 ENCOUNTER — PATIENT MESSAGE (OUTPATIENT)
Dept: ADMINISTRATIVE | Facility: HOSPITAL | Age: OVER 89
End: 2025-06-30
Payer: MEDICARE

## 2025-09-01 PROBLEM — N12 PYELONEPHRITIS: Status: ACTIVE | Noted: 2025-09-01

## 2025-09-01 PROBLEM — G40.909 SEIZURE DISORDER: Status: ACTIVE | Noted: 2025-09-01

## 2025-09-01 PROBLEM — N39.0 UTI (URINARY TRACT INFECTION): Status: ACTIVE | Noted: 2025-09-01

## 2025-09-01 PROBLEM — N30.20 CHRONIC CYSTITIS: Status: ACTIVE | Noted: 2025-09-01

## 2025-09-01 PROBLEM — N13.2 URETERAL STONE WITH HYDRONEPHROSIS: Status: ACTIVE | Noted: 2025-09-01

## 2025-09-01 PROBLEM — R31.9 HEMATURIA: Status: ACTIVE | Noted: 2025-09-01

## 2025-09-03 ENCOUNTER — TELEPHONE (OUTPATIENT)
Dept: UROLOGY | Facility: CLINIC | Age: OVER 89
End: 2025-09-03
Payer: MEDICARE

## 2025-09-05 ENCOUNTER — PATIENT OUTREACH (OUTPATIENT)
Dept: ADMINISTRATIVE | Facility: CLINIC | Age: OVER 89
End: 2025-09-05
Payer: MEDICARE